# Patient Record
Sex: FEMALE | Race: BLACK OR AFRICAN AMERICAN | NOT HISPANIC OR LATINO | Employment: OTHER | URBAN - METROPOLITAN AREA
[De-identification: names, ages, dates, MRNs, and addresses within clinical notes are randomized per-mention and may not be internally consistent; named-entity substitution may affect disease eponyms.]

---

## 2017-08-03 ENCOUNTER — TRANSCRIBE ORDERS (OUTPATIENT)
Dept: ADMINISTRATIVE | Facility: HOSPITAL | Age: 57
End: 2017-08-03

## 2017-08-03 DIAGNOSIS — Z12.31 VISIT FOR SCREENING MAMMOGRAM: Primary | ICD-10-CM

## 2017-08-09 ENCOUNTER — HOSPITAL ENCOUNTER (OUTPATIENT)
Dept: MAMMOGRAPHY | Facility: HOSPITAL | Age: 57
Discharge: HOME | End: 2017-08-09
Attending: OBSTETRICS & GYNECOLOGY | Admitting: OBSTETRICS & GYNECOLOGY

## 2017-08-09 DIAGNOSIS — Z12.31 VISIT FOR SCREENING MAMMOGRAM: ICD-10-CM

## 2017-08-09 PROCEDURE — G0202 SCR MAMMO BI INCL CAD: HCPCS

## 2017-08-09 PROCEDURE — 77063 BREAST TOMOSYNTHESIS BI: CPT | Performed by: RADIOLOGY

## 2017-08-09 PROCEDURE — 77067 SCR MAMMO BI INCL CAD: CPT | Performed by: RADIOLOGY

## 2017-08-09 PROCEDURE — 77063 BREAST TOMOSYNTHESIS BI: CPT

## 2018-09-02 ENCOUNTER — APPOINTMENT (OUTPATIENT)
Dept: GENERAL RADIOLOGY | Facility: HOSPITAL | Age: 58
End: 2018-09-02

## 2018-09-02 ENCOUNTER — HOSPITAL ENCOUNTER (EMERGENCY)
Facility: HOSPITAL | Age: 58
Discharge: HOME | End: 2018-09-02
Attending: EMERGENCY MEDICINE | Admitting: EMERGENCY MEDICINE

## 2018-09-02 ENCOUNTER — APPOINTMENT (OUTPATIENT)
Dept: MRI IMAGING | Facility: HOSPITAL | Age: 58
End: 2018-09-02

## 2018-09-02 VITALS
DIASTOLIC BLOOD PRESSURE: 85 MMHG | SYSTOLIC BLOOD PRESSURE: 154 MMHG | HEIGHT: 65 IN | HEART RATE: 75 BPM | RESPIRATION RATE: 18 BRPM | TEMPERATURE: 98.3 F | OXYGEN SATURATION: 99 % | BODY MASS INDEX: 28.32 KG/M2 | WEIGHT: 170 LBS

## 2018-09-02 DIAGNOSIS — R00.2 RAPID PALPITATIONS: ICD-10-CM

## 2018-09-02 DIAGNOSIS — R55 NEAR SYNCOPE: Primary | ICD-10-CM

## 2018-09-02 DIAGNOSIS — R42 LIGHT HEADEDNESS: ICD-10-CM

## 2018-09-02 LAB
ALBUMIN SERPL-MCNC: 4.83 G/DL (ref 3.2–4.8)
ALBUMIN/GLOB SERPL: 1.7 G/DL (ref 1.5–2.5)
ALP SERPL-CCNC: 84 U/L (ref 25–100)
ALT SERPL W P-5'-P-CCNC: 14 U/L (ref 7–40)
ANION GAP SERPL CALCULATED.3IONS-SCNC: 11 MMOL/L (ref 3–11)
AST SERPL-CCNC: 19 U/L (ref 0–33)
BASOPHILS # BLD AUTO: 0.01 10*3/MM3 (ref 0–0.2)
BASOPHILS NFR BLD AUTO: 0.2 % (ref 0–1)
BILIRUB SERPL-MCNC: 0.4 MG/DL (ref 0.3–1.2)
BILIRUB UR QL STRIP: NEGATIVE
BUN BLD-MCNC: 15 MG/DL (ref 9–23)
BUN/CREAT SERPL: 17.6 (ref 7–25)
CALCIUM SPEC-SCNC: 9.7 MG/DL (ref 8.7–10.4)
CHLORIDE SERPL-SCNC: 103 MMOL/L (ref 99–109)
CLARITY UR: CLEAR
CO2 SERPL-SCNC: 29 MMOL/L (ref 20–31)
COLOR UR: YELLOW
CREAT BLD-MCNC: 0.85 MG/DL (ref 0.6–1.3)
DEPRECATED RDW RBC AUTO: 40.6 FL (ref 37–54)
EOSINOPHIL # BLD AUTO: 0.1 10*3/MM3 (ref 0–0.3)
EOSINOPHIL NFR BLD AUTO: 2.1 % (ref 0–3)
ERYTHROCYTE [DISTWIDTH] IN BLOOD BY AUTOMATED COUNT: 13.1 % (ref 11.3–14.5)
GFR SERPL CREATININE-BSD FRML MDRD: 83 ML/MIN/1.73
GLOBULIN UR ELPH-MCNC: 2.8 GM/DL
GLUCOSE BLD-MCNC: 100 MG/DL (ref 70–100)
GLUCOSE UR STRIP-MCNC: NEGATIVE MG/DL
HCT VFR BLD AUTO: 40.2 % (ref 34.5–44)
HGB BLD-MCNC: 12.4 G/DL (ref 11.5–15.5)
HGB UR QL STRIP.AUTO: NEGATIVE
HOLD SPECIMEN: NORMAL
HOLD SPECIMEN: NORMAL
IMM GRANULOCYTES # BLD: 0.04 10*3/MM3 (ref 0–0.03)
IMM GRANULOCYTES NFR BLD: 0.8 % (ref 0–0.6)
KETONES UR QL STRIP: NEGATIVE
LEUKOCYTE ESTERASE UR QL STRIP.AUTO: NEGATIVE
LYMPHOCYTES # BLD AUTO: 1.33 10*3/MM3 (ref 0.6–4.8)
LYMPHOCYTES NFR BLD AUTO: 27.4 % (ref 24–44)
MAGNESIUM SERPL-MCNC: 2.1 MG/DL (ref 1.3–2.7)
MCH RBC QN AUTO: 26.4 PG (ref 27–31)
MCHC RBC AUTO-ENTMCNC: 30.8 G/DL (ref 32–36)
MCV RBC AUTO: 85.5 FL (ref 80–99)
MONOCYTES # BLD AUTO: 0.2 10*3/MM3 (ref 0–1)
MONOCYTES NFR BLD AUTO: 4.1 % (ref 0–12)
NEUTROPHILS # BLD AUTO: 3.21 10*3/MM3 (ref 1.5–8.3)
NEUTROPHILS NFR BLD AUTO: 66.2 % (ref 41–71)
NITRITE UR QL STRIP: NEGATIVE
PH UR STRIP.AUTO: 7.5 [PH] (ref 5–8)
PLATELET # BLD AUTO: 218 10*3/MM3 (ref 150–450)
PMV BLD AUTO: 10.9 FL (ref 6–12)
POTASSIUM BLD-SCNC: 3.4 MMOL/L (ref 3.5–5.5)
PROT SERPL-MCNC: 7.6 G/DL (ref 5.7–8.2)
PROT UR QL STRIP: NEGATIVE
RBC # BLD AUTO: 4.7 10*6/MM3 (ref 3.89–5.14)
SODIUM BLD-SCNC: 143 MMOL/L (ref 132–146)
SP GR UR STRIP: 1.01 (ref 1–1.03)
TROPONIN I SERPL-MCNC: 0 NG/ML (ref 0–0.07)
UROBILINOGEN UR QL STRIP: NORMAL
WBC NRBC COR # BLD: 4.85 10*3/MM3 (ref 3.5–10.8)
WHOLE BLOOD HOLD SPECIMEN: NORMAL
WHOLE BLOOD HOLD SPECIMEN: NORMAL

## 2018-09-02 PROCEDURE — 81003 URINALYSIS AUTO W/O SCOPE: CPT | Performed by: EMERGENCY MEDICINE

## 2018-09-02 PROCEDURE — 70551 MRI BRAIN STEM W/O DYE: CPT

## 2018-09-02 PROCEDURE — 83735 ASSAY OF MAGNESIUM: CPT | Performed by: EMERGENCY MEDICINE

## 2018-09-02 PROCEDURE — 25010000002 LORAZEPAM PER 2 MG: Performed by: EMERGENCY MEDICINE

## 2018-09-02 PROCEDURE — 85025 COMPLETE CBC W/AUTO DIFF WBC: CPT | Performed by: EMERGENCY MEDICINE

## 2018-09-02 PROCEDURE — 93005 ELECTROCARDIOGRAM TRACING: CPT | Performed by: EMERGENCY MEDICINE

## 2018-09-02 PROCEDURE — 80053 COMPREHEN METABOLIC PANEL: CPT | Performed by: EMERGENCY MEDICINE

## 2018-09-02 PROCEDURE — 84484 ASSAY OF TROPONIN QUANT: CPT

## 2018-09-02 PROCEDURE — 99285 EMERGENCY DEPT VISIT HI MDM: CPT

## 2018-09-02 PROCEDURE — 96374 THER/PROPH/DIAG INJ IV PUSH: CPT

## 2018-09-02 PROCEDURE — 71045 X-RAY EXAM CHEST 1 VIEW: CPT

## 2018-09-02 RX ORDER — AMLODIPINE BESYLATE 2.5 MG/1
2.5 TABLET ORAL DAILY
COMMUNITY
End: 2019-01-25 | Stop reason: SDUPTHER

## 2018-09-02 RX ORDER — DOXAZOSIN MESYLATE 4 MG/1
4 TABLET ORAL NIGHTLY
COMMUNITY
End: 2018-09-06

## 2018-09-02 RX ORDER — LOSARTAN POTASSIUM AND HYDROCHLOROTHIAZIDE 25; 100 MG/1; MG/1
1 TABLET ORAL DAILY
COMMUNITY
End: 2020-08-06 | Stop reason: DRUGHIGH

## 2018-09-02 RX ORDER — LORAZEPAM 2 MG/ML
1 INJECTION INTRAMUSCULAR ONCE
Status: COMPLETED | OUTPATIENT
Start: 2018-09-02 | End: 2018-09-02

## 2018-09-02 RX ORDER — POTASSIUM CHLORIDE 750 MG/1
20 CAPSULE, EXTENDED RELEASE ORAL ONCE
Status: COMPLETED | OUTPATIENT
Start: 2018-09-02 | End: 2018-09-02

## 2018-09-02 RX ORDER — SODIUM CHLORIDE 0.9 % (FLUSH) 0.9 %
10 SYRINGE (ML) INJECTION AS NEEDED
Status: DISCONTINUED | OUTPATIENT
Start: 2018-09-02 | End: 2018-09-02 | Stop reason: HOSPADM

## 2018-09-02 RX ADMIN — LORAZEPAM 1 MG: 2 INJECTION INTRAMUSCULAR; INTRAVENOUS at 11:47

## 2018-09-02 RX ADMIN — POTASSIUM CHLORIDE 20 MEQ: 750 CAPSULE, EXTENDED RELEASE ORAL at 13:29

## 2018-09-02 NOTE — ED PROVIDER NOTES
Subjective   57-year-old female presents to the emergency department after an episode of lightheadedness and presyncope.  The patient states that she was getting ready for [employer] this morning and had a sudden onset of profound lightheadedness.  She thought she might pass out.  She then noticed that her heart was racing and pounding.  Her symptoms resolved after a few minutes.  She had no associated nausea.  No tenderness.  The patient notes that she has had several episodes like this in the past that typically resolve after a brief period.  She had a 24-hour Holter monitor about 2 years ago by her PCP after one of these events but no arrhythmia was detected.  The patient states that during the episode today, she had some tingling in the left hand and left foot that resolved after the episode resolved.  The patient states that she was up and had been standing for a while prior to the onset of her symptoms today.  She currently denies any dizziness or lightheadedness and the tingling in her hand and foot have resolved.  There has been no recent illness.  No vomiting or diarrhea.  No change in her medications.  Her only medical history is of hypertension (on losartan, amlodipine and oxygen Zosyn).  She is a nonsmoker.  No alcohol or drug use.  Her PCP is Dr. Franklin Hunter.            Review of Systems   Constitutional: Negative for appetite change, chills, diaphoresis, fatigue and fever.   HENT: Negative for ear pain and tinnitus.    Eyes: Negative for visual disturbance.   Respiratory: Negative for cough and shortness of breath.    Cardiovascular: Positive for palpitations (period of racing heart). Negative for chest pain.   Gastrointestinal: Negative for abdominal pain, blood in stool, nausea and vomiting.   Endocrine: Negative for polydipsia, polyphagia and polyuria.   Genitourinary: Negative for dysuria.   Musculoskeletal: Negative for back pain and neck pain.   Skin: Negative for pallor and rash.    Allergic/Immunologic: Negative for immunocompromised state.   Neurological: Positive for dizziness and light-headedness. Negative for weakness.        Brief period of tingling in the left hand and foot   Hematological: Does not bruise/bleed easily.   Psychiatric/Behavioral: Negative for confusion.       Past Medical History:   Diagnosis Date   • Heart murmur    • Hypertension        No Known Allergies    Past Surgical History:   Procedure Laterality Date   • HYSTERECTOMY  07/15/2008   • OOPHORECTOMY Left 07/15/2008       Family History   Problem Relation Age of Onset   • Breast cancer Neg Hx    • Ovarian cancer Neg Hx        Social History     Social History   • Marital status: Single     Social History Main Topics   • Smoking status: Never Smoker   • Alcohol use No   • Drug use: No   • Sexual activity: Defer     Other Topics Concern   • Not on file           Objective   Physical Exam   Constitutional: She is oriented to person, place, and time. She appears well-developed and well-nourished. No distress.   HENT:   Head: Normocephalic.   Nose: Nose normal.   Mouth/Throat: Oropharynx is clear and moist.   Normal TMs bilaterally.   Eyes: Pupils are equal, round, and reactive to light. Conjunctivae and EOM are normal.   No nystagmus.   Neck: Normal range of motion. Neck supple.   No carotid bruits.   Cardiovascular: Normal rate, regular rhythm, normal heart sounds and intact distal pulses.    Pulmonary/Chest: Effort normal and breath sounds normal. She has no wheezes.   Abdominal: Soft. Bowel sounds are normal. There is no tenderness.   Musculoskeletal: Normal range of motion. She exhibits no edema or tenderness.   Neurological: She is alert and oriented to person, place, and time.   Skin: Skin is warm and dry.   Psychiatric: She has a normal mood and affect.       Procedures           ED Course      The pt is resting comfortably.  She has had no further episodes of light-headedness or dizziness.  No arhythmias seen  in ER.  MRI is negative.  Mild hypokalemia at 3.4.  Will give a po dose of KCL 20meq here.  The pt states she has had several of these episodes over the past 2 years.  I spoke with her about the possibility of orthostatic reaction while on her alpha blocker but she states she was already standing.  She was not orthostatic here.  The other concern would be for an arhythmia.  I will d/c her home with f/u for an event recorder.  She also has a murmur that has not been evaluated.  Will refer to heart and valve clinic for further evaluation of that.  Recent Results (from the past 24 hour(s))   Comprehensive Metabolic Panel    Collection Time: 09/02/18 10:44 AM   Result Value Ref Range    Glucose 100 70 - 100 mg/dL    BUN 15 9 - 23 mg/dL    Creatinine 0.85 0.60 - 1.30 mg/dL    Sodium 143 132 - 146 mmol/L    Potassium 3.4 (L) 3.5 - 5.5 mmol/L    Chloride 103 99 - 109 mmol/L    CO2 29.0 20.0 - 31.0 mmol/L    Calcium 9.7 8.7 - 10.4 mg/dL    Total Protein 7.6 5.7 - 8.2 g/dL    Albumin 4.83 (H) 3.20 - 4.80 g/dL    ALT (SGPT) 14 7 - 40 U/L    AST (SGOT) 19 0 - 33 U/L    Alkaline Phosphatase 84 25 - 100 U/L    Total Bilirubin 0.4 0.3 - 1.2 mg/dL    eGFR  African Amer 83 >60 mL/min/1.73    Globulin 2.8 gm/dL    A/G Ratio 1.7 1.5 - 2.5 g/dL    BUN/Creatinine Ratio 17.6 7.0 - 25.0    Anion Gap 11.0 3.0 - 11.0 mmol/L   Magnesium    Collection Time: 09/02/18 10:44 AM   Result Value Ref Range    Magnesium 2.1 1.3 - 2.7 mg/dL   Light Blue Top    Collection Time: 09/02/18 10:44 AM   Result Value Ref Range    Extra Tube hold for add-on    Green Top (Gel)    Collection Time: 09/02/18 10:44 AM   Result Value Ref Range    Extra Tube Hold for add-ons.    Lavender Top    Collection Time: 09/02/18 10:44 AM   Result Value Ref Range    Extra Tube hold for add-on    Gold Top - SST    Collection Time: 09/02/18 10:44 AM   Result Value Ref Range    Extra Tube Hold for add-ons.    CBC Auto Differential    Collection Time: 09/02/18 10:44 AM   Result  Value Ref Range    WBC 4.85 3.50 - 10.80 10*3/mm3    RBC 4.70 3.89 - 5.14 10*6/mm3    Hemoglobin 12.4 11.5 - 15.5 g/dL    Hematocrit 40.2 34.5 - 44.0 %    MCV 85.5 80.0 - 99.0 fL    MCH 26.4 (L) 27.0 - 31.0 pg    MCHC 30.8 (L) 32.0 - 36.0 g/dL    RDW 13.1 11.3 - 14.5 %    RDW-SD 40.6 37.0 - 54.0 fl    MPV 10.9 6.0 - 12.0 fL    Platelets 218 150 - 450 10*3/mm3    Neutrophil % 66.2 41.0 - 71.0 %    Lymphocyte % 27.4 24.0 - 44.0 %    Monocyte % 4.1 0.0 - 12.0 %    Eosinophil % 2.1 0.0 - 3.0 %    Basophil % 0.2 0.0 - 1.0 %    Immature Grans % 0.8 (H) 0.0 - 0.6 %    Neutrophils, Absolute 3.21 1.50 - 8.30 10*3/mm3    Lymphocytes, Absolute 1.33 0.60 - 4.80 10*3/mm3    Monocytes, Absolute 0.20 0.00 - 1.00 10*3/mm3    Eosinophils, Absolute 0.10 0.00 - 0.30 10*3/mm3    Basophils, Absolute 0.01 0.00 - 0.20 10*3/mm3    Immature Grans, Absolute 0.04 (H) 0.00 - 0.03 10*3/mm3   POC Troponin, Rapid    Collection Time: 09/02/18 10:44 AM   Result Value Ref Range    Troponin I 0.00 0.00 - 0.07 ng/mL   Urinalysis With Microscopic If Indicated (No Culture) - Urine, Clean Catch    Collection Time: 09/02/18 12:56 PM   Result Value Ref Range    Color, UA Yellow Yellow, Straw    Appearance, UA Clear Clear    pH, UA 7.5 5.0 - 8.0    Specific Gravity, UA 1.015 1.001 - 1.030    Glucose, UA Negative Negative    Ketones, UA Negative Negative    Bilirubin, UA Negative Negative    Blood, UA Negative Negative    Protein, UA Negative Negative    Leuk Esterase, UA Negative Negative    Nitrite, UA Negative Negative    Urobilinogen, UA 0.2 E.U./dL 0.2 - 1.0 E.U./dL     Note: In addition to lab results from this visit, the labs listed above may include labs taken at another facility or during a different encounter within the last 24 hours. Please correlate lab times with ED admission and discharge times for further clarification of the services performed during this visit.    MRI Brain Without Contrast   Preliminary Result   Unremarkable evaluation of  the brain.        DICTATED:   9/2/2018   EDITED/ls :   9/2/2018                   XR Chest 1 View   Final Result   No acute cardiopulmonary abnormality.       DICTATED:   9/2/2018   EDITED/ls :   9/2/2018        This report was finalized on 9/2/2018 1:00 PM by Pasha Etienne.            Vitals:    09/02/18 1145 09/02/18 1247 09/02/18 1249 09/02/18 1250   BP: 170/99 144/84 153/82 154/85   Patient Position:  Lying Sitting Standing   Pulse: 87 75 73 75   Resp:       Temp:       TempSrc:       SpO2: 99%      Weight:       Height:         Medications   sodium chloride 0.9 % flush 10 mL (not administered)   potassium chloride (MICRO-K) CR capsule 20 mEq (not administered)   LORazepam (ATIVAN) injection 1 mg (1 mg Intravenous Given 9/2/18 1147)     ECG/EMG Results (last 24 hours)     Procedure Component Value Units Date/Time    ECG 12 Lead [[id number]] Collected:  09/02/18 1008     Updated:  09/02/18 1009                    [facility]      Final diagnoses:   Near syncope   Light headedness   Rapid palpitations            Lonny Wilcox PA  09/02/18 1320

## 2018-09-02 NOTE — DISCHARGE INSTRUCTIONS
Rest.  Plenty of fluids.  Continue current meds.  Follow up with the [facility] for placement of an event recorder.  Call for follow up with Dr. Marks after event recorder.

## 2018-09-05 NOTE — PROGRESS NOTES
Encounter Date:09/06/2018      Patient ID: Nancy Albert is a 57 y.o. female.        Subjective:     Chief Complaint: Establish Care   palpitations, htn  History of Present Illness patient presents to the office today for ongoing evaluation of her palpitations and recent near syncopal episode. She presented to [facility] ED on 9/2/2018 after experiencing rapid heart rate and presyncope at home while getting ready for [employer].Symptoms  lasted only a few minutes.  She had no associated chest pain or nausea.  She reports a history of brief episodes in the past.  Denies syncope. She notes a history of a stress test in the past, data deficient. She notes that her blood pressure has been elevated and she notes that her norvasc had been increased to 5 in the past but she was experienced significant lightheadedness.       Patient Active Problem List   Diagnosis   • Palpitations   • Heart murmur   • Hypertension     Past Surgical History:   Procedure Laterality Date   • HYSTERECTOMY  07/15/2008   • OOPHORECTOMY Left 07/15/2008       No Known Allergies      Current Outpatient Prescriptions:   •  amLODIPine (NORVASC) 2.5 MG tablet, Take 2.5 mg by mouth Daily., Disp: , Rfl:   •  Doxazosin 4 mg qhs   •  losartan-hydrochlorothiazide (HYZAAR) 100-25 MG per tablet, Take 1 tablet by mouth Daily., Disp: , Rfl:     The following portions of the chart were reviewed and updated as appropriate: Allergies, current medications, past family history, social history, past medical history.     Review of Systems   Constitution: Positive for malaise/fatigue. Negative for chills, decreased appetite, diaphoresis, fever, weakness, night sweats, weight gain and weight loss.   HENT: Negative for congestion, hearing loss, hoarse voice and nosebleeds.    Eyes: Negative for blurred vision, visual disturbance and visual halos.   Cardiovascular: Positive for irregular heartbeat, near-syncope and palpitations. Negative for chest pain,  "claudication, cyanosis, dyspnea on exertion, leg swelling, orthopnea, paroxysmal nocturnal dyspnea and syncope.   Respiratory: Positive for snoring. Negative for cough, hemoptysis, shortness of breath, sleep disturbances due to breathing, sputum production and wheezing.    Hematologic/Lymphatic: Negative for bleeding problem. Does not bruise/bleed easily.   Skin: Negative for dry skin, itching and rash.   Musculoskeletal: Positive for joint pain. Negative for arthritis, falls, joint swelling and myalgias.   Gastrointestinal: Negative for bloating, abdominal pain, constipation, diarrhea, flatus, heartburn, hematemesis, hematochezia, melena, nausea and vomiting.   Genitourinary: Negative for dysuria, frequency, hematuria, nocturia and urgency.   Neurological: Positive for light-headedness. Negative for excessive daytime sleepiness, dizziness, headaches and loss of balance.   Psychiatric/Behavioral: Negative for depression. The patient does not have insomnia and is not nervous/anxious.            Objective:     Vitals:    09/06/18 1022 09/06/18 1025 09/06/18 1026   BP: 152/94 148/79 150/89   BP Location: Right arm Left arm Left arm   Patient Position: Sitting Sitting Standing   Pulse: 77  87   Resp: 18     Temp: 98 °F (36.7 °C)     TempSrc: Temporal Artery      SpO2: 98%     Weight: 77.7 kg (171 lb 6.4 oz)     Height: 165.1 cm (65\")           Physical Exam   Constitutional: She is oriented to person, place, and time. She appears well-developed and well-nourished. She is active and cooperative. No distress.   HENT:   Head: Normocephalic and atraumatic.   Mouth/Throat: Oropharynx is clear and moist.   Eyes: Pupils are equal, round, and reactive to light. Conjunctivae and EOM are normal.   Neck: Normal range of motion. Neck supple. No JVD present. No tracheal deviation present. No thyromegaly present.   Cardiovascular: Normal rate, regular rhythm and intact distal pulses.    Murmur heard.  Pulmonary/Chest: Effort normal " and breath sounds normal.   Abdominal: Soft. Bowel sounds are normal. She exhibits no distension. There is no tenderness.   Musculoskeletal: Normal range of motion.   Neurological: She is alert and oriented to person, place, and time.   Skin: Skin is warm, dry and intact.   Psychiatric: She has a normal mood and affect. Her behavior is normal.   Nursing note and vitals reviewed.      Lab and Diagnostic Review:      Results for orders placed or performed during the hospital encounter of 09/02/18   Comprehensive Metabolic Panel   Result Value Ref Range    Glucose 100 70 - 100 mg/dL    BUN 15 9 - 23 mg/dL    Creatinine 0.85 0.60 - 1.30 mg/dL    Sodium 143 132 - 146 mmol/L    Potassium 3.4 (L) 3.5 - 5.5 mmol/L    Chloride 103 99 - 109 mmol/L    CO2 29.0 20.0 - 31.0 mmol/L    Calcium 9.7 8.7 - 10.4 mg/dL    Total Protein 7.6 5.7 - 8.2 g/dL    Albumin 4.83 (H) 3.20 - 4.80 g/dL    ALT (SGPT) 14 7 - 40 U/L    AST (SGOT) 19 0 - 33 U/L    Alkaline Phosphatase 84 25 - 100 U/L    Total Bilirubin 0.4 0.3 - 1.2 mg/dL    eGFR  African Amer 83 >60 mL/min/1.73    Globulin 2.8 gm/dL    A/G Ratio 1.7 1.5 - 2.5 g/dL    BUN/Creatinine Ratio 17.6 7.0 - 25.0    Anion Gap 11.0 3.0 - 11.0 mmol/L   Magnesium   Result Value Ref Range    Magnesium 2.1 1.3 - 2.7 mg/dL   Urinalysis With Microscopic If Indicated (No Culture) - Urine, Clean Catch   Result Value Ref Range    Color, UA Yellow Yellow, Straw    Appearance, UA Clear Clear    pH, UA 7.5 5.0 - 8.0    Specific Gravity, UA 1.015 1.001 - 1.030    Glucose, UA Negative Negative    Ketones, UA Negative Negative    Bilirubin, UA Negative Negative    Blood, UA Negative Negative    Protein, UA Negative Negative    Leuk Esterase, UA Negative Negative    Nitrite, UA Negative Negative    Urobilinogen, UA 0.2 E.U./dL 0.2 - 1.0 E.U./dL   CBC Auto Differential   Result Value Ref Range    WBC 4.85 3.50 - 10.80 10*3/mm3    RBC 4.70 3.89 - 5.14 10*6/mm3    Hemoglobin 12.4 11.5 - 15.5 g/dL    Hematocrit  40.2 34.5 - 44.0 %    MCV 85.5 80.0 - 99.0 fL    MCH 26.4 (L) 27.0 - 31.0 pg    MCHC 30.8 (L) 32.0 - 36.0 g/dL    RDW 13.1 11.3 - 14.5 %    RDW-SD 40.6 37.0 - 54.0 fl    MPV 10.9 6.0 - 12.0 fL    Platelets 218 150 - 450 10*3/mm3    Neutrophil % 66.2 41.0 - 71.0 %    Lymphocyte % 27.4 24.0 - 44.0 %    Monocyte % 4.1 0.0 - 12.0 %    Eosinophil % 2.1 0.0 - 3.0 %    Basophil % 0.2 0.0 - 1.0 %    Immature Grans % 0.8 (H) 0.0 - 0.6 %    Neutrophils, Absolute 3.21 1.50 - 8.30 10*3/mm3    Lymphocytes, Absolute 1.33 0.60 - 4.80 10*3/mm3    Monocytes, Absolute 0.20 0.00 - 1.00 10*3/mm3    Eosinophils, Absolute 0.10 0.00 - 0.30 10*3/mm3    Basophils, Absolute 0.01 0.00 - 0.20 10*3/mm3    Immature Grans, Absolute 0.04 (H) 0.00 - 0.03 10*3/mm3   POC Troponin, Rapid   Result Value Ref Range    Troponin I 0.00 0.00 - 0.07 ng/mL     9/2/18 NSR     Assessment and Plan:         1. Palpitations    - Adult Transthoracic Echo Complete W/ Cont if Necessary Per Protocol; Future  - Holter Monitor - 72 Hour Up To 21 Days; Future    2. Essential hypertension  D/c cardura  Begin hydralazine 25 mg bid  HTN Education provided today including signs and symptoms, medication management, daily blood pressure monitoring. Patient encouraged to call the Heart and Valve center with any abnormal readings.   - Adult Transthoracic Echo Complete W/ Cont if Necessary Per Protocol; Future    3. Murmur, cardiac    - Adult Transthoracic Echo Complete W/ Cont if Necessary Per Protocol; Future    It has been a pleasure to participate in the care of this patient.  Patient was instructed to call the [facility] with any questions, concerns, or worsening symptoms.    * Please note that portions of this note were completed with a voice recognition program. Efforts were made to edit the dictation but occasionally words are transcribed.

## 2018-09-06 ENCOUNTER — OFFICE VISIT (OUTPATIENT)
Dept: CARDIOLOGY | Facility: HOSPITAL | Age: 58
End: 2018-09-06

## 2018-09-06 ENCOUNTER — HOSPITAL ENCOUNTER (OUTPATIENT)
Dept: CARDIOLOGY | Facility: HOSPITAL | Age: 58
Discharge: HOME | End: 2018-09-06
Admitting: NURSE PRACTITIONER

## 2018-09-06 VITALS
BODY MASS INDEX: 28.56 KG/M2 | RESPIRATION RATE: 18 BRPM | TEMPERATURE: 98 F | HEART RATE: 87 BPM | OXYGEN SATURATION: 98 % | DIASTOLIC BLOOD PRESSURE: 89 MMHG | HEIGHT: 65 IN | SYSTOLIC BLOOD PRESSURE: 150 MMHG | WEIGHT: 171.4 LBS

## 2018-09-06 DIAGNOSIS — R00.2 PALPITATIONS: Primary | ICD-10-CM

## 2018-09-06 DIAGNOSIS — R00.2 PALPITATIONS: ICD-10-CM

## 2018-09-06 DIAGNOSIS — R01.1 MURMUR, CARDIAC: ICD-10-CM

## 2018-09-06 DIAGNOSIS — I10 ESSENTIAL HYPERTENSION: ICD-10-CM

## 2018-09-06 PROCEDURE — 0296T HC EXT ECG > 48HR TO 21 DAY RCRD W/CONECT INTL RCRD: CPT

## 2018-09-06 PROCEDURE — 0298T HOLTER MONITOR - 72 HOUR UP TO 21 DAY: CPT | Performed by: INTERNAL MEDICINE

## 2018-09-06 PROCEDURE — 99214 OFFICE O/P EST MOD 30 MIN: CPT | Performed by: NURSE PRACTITIONER

## 2018-09-06 RX ORDER — HYDRALAZINE HYDROCHLORIDE 25 MG/1
25 TABLET, FILM COATED ORAL 2 TIMES DAILY
Qty: 60 TABLET | Refills: 11 | Status: SHIPPED | OUTPATIENT
Start: 2018-09-06 | End: 2018-10-03 | Stop reason: SINTOL

## 2018-09-10 ENCOUNTER — TELEPHONE (OUTPATIENT)
Dept: CARDIOLOGY | Facility: HOSPITAL | Age: 58
End: 2018-09-10

## 2018-09-10 PROBLEM — R01.1 HEART MURMUR: Status: ACTIVE | Noted: 2018-09-10

## 2018-09-10 PROBLEM — I10 HYPERTENSION: Status: ACTIVE | Noted: 2018-09-10

## 2018-09-10 PROBLEM — R00.2 PALPITATIONS: Status: ACTIVE | Noted: 2018-09-10

## 2018-09-10 NOTE — TELEPHONE ENCOUNTER
----- Message from Ankit France sent at 9/10/2018 11:38 AM EDT -----  From Centralized Scheduling:    UNABLE TO AFFORD ECHO

## 2018-09-20 ENCOUNTER — APPOINTMENT (OUTPATIENT)
Dept: CARDIOLOGY | Facility: HOSPITAL | Age: 58
End: 2018-09-20

## 2018-09-27 ENCOUNTER — TELEPHONE (OUTPATIENT)
Dept: CARDIOLOGY | Facility: HOSPITAL | Age: 58
End: 2018-09-27

## 2018-09-27 DIAGNOSIS — I47.29 NSVT (NONSUSTAINED VENTRICULAR TACHYCARDIA) (HCC): Primary | ICD-10-CM

## 2018-09-27 NOTE — TELEPHONE ENCOUNTER
Attempted to reach patient to discuss abnormal findings on recent monitor. Left voicemail. Will order stress echo to evaluate.

## 2018-10-01 ENCOUNTER — TELEPHONE (OUTPATIENT)
Dept: CARDIOLOGY | Facility: HOSPITAL | Age: 58
End: 2018-10-01

## 2018-10-01 NOTE — TELEPHONE ENCOUNTER
Attempted to call patient to discuss recent monitor findings. Stress echo has been ordered, to be scheduled through centralized scheduling.

## 2018-10-02 NOTE — PROGRESS NOTES
Encounter Date:10/03/2018      Patient ID: Nancy Albert is a 57 y.o. female.        Subjective:     Chief Complaint: Follow-up   palpitations and htn    History of Present Illness  Patient presents to the office today for ongoing evaluation of her palpitations and hypertension. Patient was seen in [facility] one month ago and she was started on hydralazine for her htn in place of cardura. She notes since starting the hydralazine she has felt terrible. She notes worsening fatigue, chest tightness and a lump in her throat. She also notes intermittent lightheadedness. She presents today with bp log that shows bps 143//86.     Patient Active Problem List   Diagnosis   • Palpitations   • Heart murmur   • Hypertension   • NSVT (nonsustained ventricular tachycardia) (CMS/[facility])       Past Surgical History:   Procedure Laterality Date   • HYSTERECTOMY  07/15/2008   • OOPHORECTOMY Left 07/15/2008       No Known Allergies      Current Outpatient Prescriptions:   •  amLODIPine (NORVASC) 2.5 MG tablet, Take 2.5 mg by mouth Daily., Disp: , Rfl:   •  losartan-hydrochlorothiazide (HYZAAR) 100-25 MG per tablet, Take 1 tablet by mouth Daily., Disp: , Rfl:   •  hydralazine 25 mg  Take 1 tablet by mouth 2 (Two) Times a Day., Disp: 60 tablet, Rfl: 11    The following portions of the chart were reviewed and updated as appropriate: Allergies, current medications, past family history, social history, past medical history.     Review of Systems   Constitution: Positive for malaise/fatigue. Negative for chills, decreased appetite, diaphoresis, fever, weakness, night sweats, weight gain and weight loss.   HENT: Negative for congestion, hearing loss, hoarse voice and nosebleeds.    Eyes: Negative for blurred vision, visual disturbance and visual halos.   Cardiovascular: Positive for chest pain, dyspnea on exertion and palpitations. Negative for claudication, cyanosis, irregular heartbeat, leg swelling, near-syncope, orthopnea, paroxysmal  "nocturnal dyspnea and syncope.   Respiratory: Negative for cough, hemoptysis, shortness of breath, sleep disturbances due to breathing, snoring, sputum production and wheezing.    Hematologic/Lymphatic: Negative for bleeding problem. Does not bruise/bleed easily.   Skin: Negative for dry skin, itching and rash.   Musculoskeletal: Negative for arthritis, joint pain, joint swelling and myalgias.   Gastrointestinal: Negative for bloating, abdominal pain, constipation, diarrhea, flatus, heartburn, hematemesis, hematochezia, melena, nausea and vomiting.   Genitourinary: Negative for dysuria, frequency, hematuria, nocturia and urgency.   Neurological: Positive for light-headedness. Negative for excessive daytime sleepiness, dizziness, headaches and loss of balance.   Psychiatric/Behavioral: Negative for depression. The patient does not have insomnia and is not nervous/anxious.            Objective:     Vitals:    10/03/18 1051 10/03/18 1053   BP: 162/77 156/66   BP Location: Left arm Left arm   Patient Position: Sitting Standing   Cuff Size: Adult    Pulse: 75 87   Resp: 16    Temp: 97.8 °F (36.6 °C)    TempSrc: Temporal Artery     SpO2: 98%    Weight: 75.8 kg (167 lb)    Height: 165.1 cm (65\")          Physical Exam   Constitutional: She is oriented to person, place, and time. She appears well-developed and well-nourished. She is active and cooperative. No distress.   HENT:   Head: Normocephalic and atraumatic.   Mouth/Throat: Oropharynx is clear and moist.   Eyes: Pupils are equal, round, and reactive to light. Conjunctivae and EOM are normal.   Neck: Normal range of motion. Neck supple. No JVD present. No tracheal deviation present. No thyromegaly present.   Cardiovascular: Normal rate, regular rhythm and intact distal pulses.    Murmur heard.  Pulmonary/Chest: Effort normal and breath sounds normal.   Abdominal: Soft. Bowel sounds are normal. She exhibits no distension. There is no tenderness.   Musculoskeletal: " Normal range of motion.   Neurological: She is alert and oriented to person, place, and time.   Skin: Skin is warm, dry and intact.   Psychiatric: She has a normal mood and affect. Her behavior is normal.   Nursing note and vitals reviewed.      Lab and Diagnostic Review:   14 day extended holter showed; avg hr of 76, pvc burden 1%, pac burden <1%, one run of NSVT at 6 beats      Assessment and Plan:         1. Palpitations  Low burden on Monitor    2. NSVT (nonsustained ventricular tachycardia) (CMS/HCC)  6 beat run on recent extended holter  Stress echo in near future  3. Essential hypertension  D/c hydralazine due to side effects  Begin coreg 6.25 mg bid  HTN Education provided today including signs and symptoms, medication management, daily blood pressure monitoring. Patient encouraged to call the Heart and Valve center with any abnormal readings.     It has been a pleasure to participate in the care of this patient.  Patient was instructed to call the [facility] with any questions, concerns, or worsening symptoms.  * Please note that portions of this note were completed with a voice recognition program. Efforts were made to edit the dictation but occasionally words are transcribed.

## 2018-10-03 ENCOUNTER — OFFICE VISIT (OUTPATIENT)
Dept: CARDIOLOGY | Facility: HOSPITAL | Age: 58
End: 2018-10-03

## 2018-10-03 VITALS
BODY MASS INDEX: 27.82 KG/M2 | RESPIRATION RATE: 16 BRPM | HEIGHT: 65 IN | DIASTOLIC BLOOD PRESSURE: 66 MMHG | HEART RATE: 87 BPM | SYSTOLIC BLOOD PRESSURE: 156 MMHG | WEIGHT: 167 LBS | TEMPERATURE: 97.8 F | OXYGEN SATURATION: 98 %

## 2018-10-03 DIAGNOSIS — I10 ESSENTIAL HYPERTENSION: ICD-10-CM

## 2018-10-03 DIAGNOSIS — R00.2 PALPITATIONS: Primary | ICD-10-CM

## 2018-10-03 DIAGNOSIS — I47.29 NSVT (NONSUSTAINED VENTRICULAR TACHYCARDIA) (HCC): ICD-10-CM

## 2018-10-03 PROCEDURE — 99214 OFFICE O/P EST MOD 30 MIN: CPT | Performed by: NURSE PRACTITIONER

## 2018-10-03 RX ORDER — CARVEDILOL 6.25 MG/1
6.25 TABLET ORAL 2 TIMES DAILY
Qty: 60 TABLET | Refills: 11 | Status: SHIPPED | OUTPATIENT
Start: 2018-10-03 | End: 2019-02-13

## 2018-10-04 PROBLEM — I47.29 NSVT (NONSUSTAINED VENTRICULAR TACHYCARDIA) (HCC): Status: ACTIVE | Noted: 2018-10-04

## 2018-10-12 ENCOUNTER — TELEPHONE (OUTPATIENT)
Dept: CARDIOLOGY | Facility: HOSPITAL | Age: 58
End: 2018-10-12

## 2018-10-12 ENCOUNTER — HOSPITAL ENCOUNTER (OUTPATIENT)
Dept: CARDIOLOGY | Facility: HOSPITAL | Age: 58
Discharge: HOME | End: 2018-10-12
Admitting: NURSE PRACTITIONER

## 2018-10-12 VITALS
DIASTOLIC BLOOD PRESSURE: 82 MMHG | HEIGHT: 65 IN | BODY MASS INDEX: 27.82 KG/M2 | WEIGHT: 167 LBS | SYSTOLIC BLOOD PRESSURE: 154 MMHG

## 2018-10-12 DIAGNOSIS — I47.29 NSVT (NONSUSTAINED VENTRICULAR TACHYCARDIA) (HCC): ICD-10-CM

## 2018-10-12 LAB
BH CV ECHO MEAS - BSA(HAYCOCK): 1.9 M^2
BH CV ECHO MEAS - BSA: 1.8 M^2
BH CV ECHO MEAS - BZI_BMI: 27.8 KILOGRAMS/M^2
BH CV ECHO MEAS - BZI_METRIC_HEIGHT: 165.1 CM
BH CV ECHO MEAS - BZI_METRIC_WEIGHT: 75.8 KG
BH CV ECHO MEAS - EDV(CUBED): 83.2 ML
BH CV ECHO MEAS - EDV(TEICH): 86.1 ML
BH CV ECHO MEAS - EF(CUBED): 72.5 %
BH CV ECHO MEAS - EF(TEICH): 64.5 %
BH CV ECHO MEAS - ESV(CUBED): 22.8 ML
BH CV ECHO MEAS - ESV(TEICH): 30.5 ML
BH CV ECHO MEAS - FS: 35 %
BH CV ECHO MEAS - IVS/LVPW: 0.95
BH CV ECHO MEAS - IVSD: 0.99 CM
BH CV ECHO MEAS - LV MASS(C)D: 148.6 GRAMS
BH CV ECHO MEAS - LV MASS(C)DI: 81.1 GRAMS/M^2
BH CV ECHO MEAS - LVIDD: 4.4 CM
BH CV ECHO MEAS - LVIDS: 2.8 CM
BH CV ECHO MEAS - LVPWD: 1 CM
BH CV ECHO MEAS - RAP SYSTOLE: 3 MMHG
BH CV ECHO MEAS - RVSP: 29 MMHG
BH CV ECHO MEAS - SI(CUBED): 33 ML/M^2
BH CV ECHO MEAS - SI(TEICH): 30.3 ML/M^2
BH CV ECHO MEAS - SV(CUBED): 60.4 ML
BH CV ECHO MEAS - SV(TEICH): 55.6 ML
BH CV ECHO MEAS - TR MAX PG: 26 MMHG
BH CV ECHO MEAS - TR MAX VEL: 252.3 CM/SEC
BH CV STRESS BP STAGE 1: NORMAL
BH CV STRESS DURATION MIN STAGE 1: 3
BH CV STRESS DURATION MIN STAGE 2: 1
BH CV STRESS DURATION SEC STAGE 1: 0
BH CV STRESS DURATION SEC STAGE 2: 34
BH CV STRESS GRADE STAGE 1: 10
BH CV STRESS GRADE STAGE 2: 12
BH CV STRESS HR STAGE 1: 131
BH CV STRESS HR STAGE 2: 142
BH CV STRESS METS STAGE 1: 5
BH CV STRESS METS STAGE 2: 7.5
BH CV STRESS O2 STAGE 1: 95
BH CV STRESS O2 STAGE 2: 94
BH CV STRESS PROTOCOL 1: NORMAL
BH CV STRESS RECOVERY BP: NORMAL MMHG
BH CV STRESS RECOVERY HR: 74 BPM
BH CV STRESS RECOVERY O2: 98 %
BH CV STRESS SPEED STAGE 1: 1.7
BH CV STRESS SPEED STAGE 2: 2.5
BH CV STRESS STAGE 1: 1
BH CV STRESS STAGE 2: 2
BH CV VAS BP RIGHT ARM: NORMAL MMHG
MAXIMAL PREDICTED HEART RATE: 162 BPM
PERCENT MAX PREDICTED HR: 88.89 %
STRESS BASELINE BP: NORMAL MMHG
STRESS BASELINE HR: 67 BPM
STRESS O2 SAT REST: 98 %
STRESS PERCENT HR: 105 %
STRESS POST ESTIMATED WORKLOAD: 7 METS
STRESS POST EXERCISE DUR MIN: 5 MIN
STRESS POST EXERCISE DUR SEC: 34 SEC
STRESS POST O2 SAT PEAK: 94 %
STRESS POST PEAK BP: NORMAL MMHG
STRESS POST PEAK HR: 144 BPM
STRESS TARGET HR: 138 BPM

## 2018-10-12 PROCEDURE — 93352 ADMIN ECG CONTRAST AGENT: CPT | Performed by: INTERNAL MEDICINE

## 2018-10-12 PROCEDURE — 93350 STRESS TTE ONLY: CPT | Performed by: INTERNAL MEDICINE

## 2018-10-12 PROCEDURE — 25010000002 SULFUR HEXAFLUORIDE MICROSPH 60.7-25 MG RECONSTITUTED SUSPENSION: Performed by: NURSE PRACTITIONER

## 2018-10-12 PROCEDURE — 93350 STRESS TTE ONLY: CPT

## 2018-10-12 PROCEDURE — 93018 CV STRESS TEST I&R ONLY: CPT | Performed by: INTERNAL MEDICINE

## 2018-10-12 PROCEDURE — 93017 CV STRESS TEST TRACING ONLY: CPT

## 2018-10-12 RX ADMIN — SULFUR HEXAFLUORIDE 4 ML: KIT at 10:01

## 2018-10-12 NOTE — TELEPHONE ENCOUNTER
Reviewed stress echo results with patient. Patient to continue coreg 6.25 mg bid. She notes that her blood pressure yesterday was 150/80. Patient to receive a follow-up call in one week to reevaluate bp.

## 2018-10-19 ENCOUNTER — APPOINTMENT (OUTPATIENT)
Dept: CARDIOLOGY | Facility: HOSPITAL | Age: 58
End: 2018-10-19

## 2018-11-06 ENCOUNTER — HOSPITAL ENCOUNTER (OUTPATIENT)
Dept: CARDIOLOGY | Facility: HOSPITAL | Age: 58
Discharge: HOME | End: 2018-11-06
Admitting: NURSE PRACTITIONER

## 2018-11-06 ENCOUNTER — OFFICE VISIT (OUTPATIENT)
Dept: CARDIOLOGY | Facility: HOSPITAL | Age: 58
End: 2018-11-06

## 2018-11-06 VITALS
RESPIRATION RATE: 16 BRPM | TEMPERATURE: 97.5 F | SYSTOLIC BLOOD PRESSURE: 145 MMHG | HEIGHT: 65 IN | WEIGHT: 164 LBS | BODY MASS INDEX: 27.32 KG/M2 | OXYGEN SATURATION: 97 % | DIASTOLIC BLOOD PRESSURE: 82 MMHG | HEART RATE: 64 BPM

## 2018-11-06 DIAGNOSIS — I47.29 NSVT (NONSUSTAINED VENTRICULAR TACHYCARDIA) (HCC): ICD-10-CM

## 2018-11-06 DIAGNOSIS — R00.2 PALPITATIONS: ICD-10-CM

## 2018-11-06 DIAGNOSIS — I10 ESSENTIAL HYPERTENSION: Primary | ICD-10-CM

## 2018-11-06 PROCEDURE — 99214 OFFICE O/P EST MOD 30 MIN: CPT | Performed by: NURSE PRACTITIONER

## 2018-11-06 PROCEDURE — 93786 AMBL BP MNTR W/SW REC ONLY: CPT

## 2018-11-06 NOTE — PROGRESS NOTES
Reason for Visit to The [facility]: 24hr. Ambulatory Blood Pressure Monitor.    Ms. Alebrt saw Provider Frances Pascual on 10/06/18. She order  a 24hr ambulatory blood pressure monitor for Dx:  Hypertension.      The monitor was placed on her Left  arm & she was instructed in its use and had no questions.      She was provided with written instructions to refer to as well.  She will wear the monitor until tomorrow around 11:00am.      She will bring it back to our office tomorrow or mail it to us for download.      Results will be forwarded to Frances Pascual for analysis and treatment adjustment if indicated.    Mari Barton 11/06/18 11:22 AM   [facility]

## 2018-11-06 NOTE — PATIENT INSTRUCTIONS
You will be called with an appointment for Dr Beckford  Please wear blood pressure monitor for 24 hours

## 2018-11-06 NOTE — PROGRESS NOTES
Encounter Date:11/06/2018      Patient ID: Nancy Albert is a 58 y.o. female.        Subjective:     Chief Complaint: Follow-up   HTN  History of Present Illness Patient presents to the office for ongoing evaluation of her hypertension. Patient has long history of HTN and has been on multiple agents in the past. Patient has been on cardura with no improvement in her blood pressure. She has also been on hydralazine in the past but notes that she felt terrible when she took it and reported worsening fatigue, chest tightness and a lump in her throat. She also noted intermittent lightheadedness.Patient notes that Dr Will increased her norvasc to 5 mg at one point but she noted that she experienced significant lightheadedness and notes that she always felt like she was going to pass out. At last office visit she was initiated on coreg 6.25 mg bid. She presents today with her home bp log which shows bps 140//77 hrs 70-80s. She notes that she has been experiencing dizziness with position changes while on the coreg. She also notes a lump in her throat and increased reflux. Denies cp, pedal edema.     Patient Active Problem List   Diagnosis   • Palpitations   • Heart murmur   • Hypertension   • NSVT (nonsustained ventricular tachycardia) (CMS/[facility])       Past Surgical History:   Procedure Laterality Date   • HYSTERECTOMY  07/15/2008   • OOPHORECTOMY Left 07/15/2008       No Known Allergies      Current Outpatient Prescriptions:   •  amLODIPine (NORVASC) 2.5 MG tablet, Take 2.5 mg by mouth Daily., Disp: , Rfl:   •  carvedilol (COREG) 6.25 MG tablet, Take 1 tablet by mouth 2 (Two) Times a Day., Disp: 60 tablet, Rfl: 11  •  losartan-hydrochlorothiazide (HYZAAR) 100-25 MG per tablet, Take 1 tablet by mouth Daily., Disp: , Rfl:     The following portions of the chart were reviewed and updated as appropriate: Allergies, current medications, past family history, social history, past medical history.     Review of Systems  "  Constitution: Negative for chills, decreased appetite, diaphoresis, fever, weakness, malaise/fatigue, night sweats, weight gain and weight loss.   HENT: Negative for congestion, hearing loss, hoarse voice and nosebleeds.         \"lump in her throat\"   Eyes: Negative for blurred vision, visual disturbance and visual halos.   Cardiovascular: Negative for chest pain, claudication, cyanosis, dyspnea on exertion, irregular heartbeat, leg swelling, near-syncope, orthopnea, palpitations, paroxysmal nocturnal dyspnea and syncope.   Respiratory: Positive for snoring. Negative for cough, hemoptysis, shortness of breath, sleep disturbances due to breathing, sputum production and wheezing.    Hematologic/Lymphatic: Negative for bleeding problem. Does not bruise/bleed easily.   Skin: Negative for dry skin, itching and rash.   Musculoskeletal: Negative for arthritis, falls, joint pain, joint swelling and myalgias.   Gastrointestinal: Positive for abdominal pain and heartburn. Negative for bloating, constipation, diarrhea, flatus, hematemesis, hematochezia, melena, nausea and vomiting.   Genitourinary: Negative for dysuria, frequency, hematuria, nocturia and urgency.   Neurological: Positive for headaches and light-headedness. Negative for excessive daytime sleepiness, dizziness and loss of balance.   Psychiatric/Behavioral: Negative for depression. The patient does not have insomnia and is not nervous/anxious.            Objective:     Vitals:    11/06/18 0938 11/06/18 0940   BP: 141/75 145/82   BP Location: Right arm Right arm   Patient Position: Sitting Standing   Cuff Size: Adult    Pulse: 70 64   Resp: 16    Temp: 97.5 °F (36.4 °C)    TempSrc: Temporal Artery     SpO2: 97%    Weight: 74.4 kg (164 lb)    Height: 165.1 cm (65\")          Physical Exam   Constitutional: She is oriented to person, place, and time. She appears well-developed and well-nourished. She is active and cooperative. No distress.   HENT:   Head: " Normocephalic and atraumatic.   Mouth/Throat: Oropharynx is clear and moist.   Eyes: Pupils are equal, round, and reactive to light. Conjunctivae and EOM are normal.   Neck: Normal range of motion. Neck supple. No JVD present. No tracheal deviation present. No thyromegaly present.   Cardiovascular: Normal rate, regular rhythm, normal heart sounds and intact distal pulses.    Pulmonary/Chest: Effort normal and breath sounds normal.   Abdominal: Soft. Bowel sounds are normal. She exhibits no distension. There is no tenderness.   Musculoskeletal: Normal range of motion.   Neurological: She is alert and oriented to person, place, and time.   Skin: Skin is warm, dry and intact.   Psychiatric: She has a normal mood and affect. Her behavior is normal.   Nursing note and vitals reviewed.      Lab and Diagnostic Review:      Lab Results   Component Value Date    GLUCOSE 100 09/02/2018    CALCIUM 9.7 09/02/2018     09/02/2018    K 3.4 (L) 09/02/2018    CO2 29.0 09/02/2018     09/02/2018    BUN 15 09/02/2018    CREATININE 0.85 09/02/2018    EGFRIFAFRI 83 09/02/2018    BCR 17.6 09/02/2018    ANIONGAP 11.0 09/02/2018     · Stress echo: Pt reported 2/10 midsternal chest pressure prior to exercise. Decreased to 1/10 and then disappeared during and after exercise.  · Expected exercise time: 7:05, actual time: 5:34  · Normal ECG stress ECG interpretation.  · Left ventricular systolic function is normal.  · Estimated EF appears to be in the range of 56 - 60%.  Normal stress echo with no significant echocardiographic evidence for myocardial ischemia.      Assessment and Plan:         1. Essential hypertension  Continue norvasc, hyzaar and coreg  - 24 Hour Blood Pressure Monitor  - Ambulatory Referral to Cardiology    2. NSVT (nonsustained ventricular tachycardia) (CMS/HCC)  Stress echo within normal limits  - Ambulatory Referral to Cardiology    3. Palpitations  Resolved    It has been a pleasure to participate in the care  of this patient.  Patient was instructed to call the [facility] with any questions, concerns, or worsening symptoms.        * Please note that portions of this note were completed with a voice recognition program. Efforts were made to edit the dictation but occasionally words are transcribed.

## 2018-11-15 ENCOUNTER — DOCUMENTATION (OUTPATIENT)
Dept: CARDIOLOGY | Facility: HOSPITAL | Age: 58
End: 2018-11-15

## 2018-11-15 NOTE — PROGRESS NOTES
[]A 24 Hr. Ambulatory Blood Pressure Monitor was worn By Mrs. Nancy Albert from 11/06/18 to 11/07/18. She returned the monitor and the results downloaded and forwarded to the ordering provider ANA Jackson for review and management of patient's symptoms. A copy of  The results was  also sent to Medical Records.     Mari Barton  [facility]      -----------------------------------------------------------------------    Reason for Visit to [facility]: 24hr. Ambulatory Blood Pressure Monitor.     Ms. Albert saw Provider Frances Pascual on 10/06/18. She order  a 24hr ambulatory blood pressure monitor for Dx:  Hypertension.       The monitor was placed on her Left  arm & she was instructed in its use and had no questions.       She was provided with written instructions to refer to as well.  She will wear the monitor until tomorrow around 11:00am.       She will bring it back to our office tomorrow or mail it to us for download.       Results will be forwarded to Frances Pascual for analysis and treatment adjustment if indicated.     Mari Barton 11/06/18 11:22 AM   [facility]

## 2019-01-02 ENCOUNTER — LAB (OUTPATIENT)
Dept: LAB | Facility: HOSPITAL | Age: 59
End: 2019-01-02

## 2019-01-02 ENCOUNTER — CONSULT (OUTPATIENT)
Dept: CARDIOLOGY | Facility: CLINIC | Age: 59
End: 2019-01-02

## 2019-01-02 VITALS
HEART RATE: 73 BPM | WEIGHT: 163.4 LBS | HEIGHT: 65 IN | SYSTOLIC BLOOD PRESSURE: 134 MMHG | DIASTOLIC BLOOD PRESSURE: 86 MMHG | BODY MASS INDEX: 27.22 KG/M2

## 2019-01-02 DIAGNOSIS — Z79.899 HIGH RISK MEDICATION USE: ICD-10-CM

## 2019-01-02 DIAGNOSIS — I47.29 NSVT (NONSUSTAINED VENTRICULAR TACHYCARDIA) (HCC): ICD-10-CM

## 2019-01-02 DIAGNOSIS — R00.2 PALPITATIONS: ICD-10-CM

## 2019-01-02 DIAGNOSIS — I11.9 HYPERTENSIVE HEART DISEASE WITHOUT HEART FAILURE: ICD-10-CM

## 2019-01-02 DIAGNOSIS — Z79.899 HIGH RISK MEDICATION USE: Primary | ICD-10-CM

## 2019-01-02 DIAGNOSIS — I10 ESSENTIAL HYPERTENSION: ICD-10-CM

## 2019-01-02 LAB
ANION GAP SERPL CALCULATED.3IONS-SCNC: 5 MMOL/L (ref 3–11)
BUN BLD-MCNC: 18 MG/DL (ref 9–23)
BUN/CREAT SERPL: 22 (ref 7–25)
CALCIUM SPEC-SCNC: 9.6 MG/DL (ref 8.7–10.4)
CHLORIDE SERPL-SCNC: 101 MMOL/L (ref 99–109)
CO2 SERPL-SCNC: 32 MMOL/L (ref 20–31)
CREAT BLD-MCNC: 0.82 MG/DL (ref 0.6–1.3)
GFR SERPL CREATININE-BSD FRML MDRD: 86 ML/MIN/1.73
GLUCOSE BLD-MCNC: 97 MG/DL (ref 70–100)
MAGNESIUM SERPL-MCNC: 2.1 MG/DL (ref 1.3–2.7)
POTASSIUM BLD-SCNC: 4.2 MMOL/L (ref 3.5–5.5)
SODIUM BLD-SCNC: 138 MMOL/L (ref 132–146)

## 2019-01-02 PROCEDURE — 99214 OFFICE O/P EST MOD 30 MIN: CPT | Performed by: INTERNAL MEDICINE

## 2019-01-02 PROCEDURE — 36415 COLL VENOUS BLD VENIPUNCTURE: CPT

## 2019-01-02 PROCEDURE — 93000 ELECTROCARDIOGRAM COMPLETE: CPT | Performed by: INTERNAL MEDICINE

## 2019-01-02 PROCEDURE — 80048 BASIC METABOLIC PNL TOTAL CA: CPT

## 2019-01-02 PROCEDURE — 83735 ASSAY OF MAGNESIUM: CPT

## 2019-01-02 NOTE — PROGRESS NOTES
Subjective:     Encounter Date:01/02/2019    Patient ID: Nancy Albert is a 58 y.o. single -American female from [place], [place], recently retired from the [employer].    PHYSICIAN: Franklin Will MD  SLEEP PHYSICIAN: [facility]  REFERRING HEALTHCARE PROVIDER: ANA Theodore    Chief Complaint:   Chief Complaint   Patient presents with   • Chest Pain   • Dizziness     Problem List:  1. Hypertensive cardiovascular disease:  a. Graded exercise test 2/11/16: The maximal exercise stress test negative by ST criteria.  No precordial  symptoms developed.  Normal systolic blood pressure response.  Normal exercise tolerance.  No ventricular ectopy recorded.  Clinical correlation required.  b. Remote 24-hour Holter monitor approximately 2016 with her physician: Normal-data deficit   c. Stress echocardiogram 10/12/18: Normal stress echo with no significant echocardiographic evidence for myocardial ischemia, EF 56-60%  d. Holter monitor 9/6/18: Abnormal monitor study with one episode of nonsustained V. tach, 6 beats in duration.  Occasional PVCs.  Rare, brief episodes of PAT.  Patient triggered event related was sinus rhythm  e. 24 hour ambulatory blood pressure monitor November 2018: Average blood pressure 100-120/60-70 torr, heart rate 60-70 bpm, maximum blood pressure 160/85  f. Residual class I symptoms  2. Hypertension  3. Apparent asymptomatic nonsustained ventricular tachycardia during hypokalemia, incidentally found on Holter monitor, September 2018  4. Palpitations  5. Cardiac murmur with acceptable echocardiogram, October 2018  6. At risk for sleep apnea with negative sleep study in 2017-data deficit  7. Surgical history: KATI, left oophorectomy    No Known Allergies    Current Outpatient Medications:   •  amLODIPine (NORVASC) 2.5 MG tablet, Take 2.5 mg by mouth Daily., Disp: , Rfl:   •  carvedilol (COREG) 6.25 MG tablet, Take 1 tablet by  "mouth 2 (Two) Times a Day., Disp: 60 tablet, Rfl: 11  •  losartan-hydrochlorothiazide (HYZAAR) 100-25 MG per tablet, Take 1 tablet by mouth Daily., Disp: , Rfl:     History of Present Illness  This is a 58-year-old -American female who was referred to us by ANA Theodore, for cardiology consultation.  The patient has had uncontrolled hypertension over the past several years.  She has tried multiple antihypertensive medications.  When she tried doxazosin, she felt like she had a \"lump in my throat,\" and she experienced presyncope/dizziness with amlodipine at 5 mg, as well as lisinopril.  She recently wore a 24-hour ambulatory blood pressure monitor which showed normal blood pressure and heart rates.  She had a stress echocardiogram which was normal.  In September 2018, she had an episode of presyncope/lightheadedness, tachycardia, and fatigue and presented to [facility] ED.  She was discharged with a Holter monitor which showed one episode of nonsustained ventricular tachycardia with occasional PVCs.  The patient notes that she had hypokalemia at this same ED visit.  She tries to eat more foods with potassium because she feels better when she does.  She retired in fall 2018 from the [employer] and has lost around 8 pounds since that time.  She had concerns in this regard but does note that she is making better food choices since retiring.  She is able to go up her stairs at home around 20 times a day without difficulties.  She line dances once a week but otherwise does not engage in much physical activity other than the normal household chores.  She denies any rheumatic fever or kidney dysfunction, chest pressure, shortness of breath, palpitations, syncope, GERD, COPD, asthma, smoking history, TIAs, seizures, CVAs, DVTs, PEs, sleep apnea, claudication, MIs, diabetes mellitus, thyroid dysfunction, PIH, preeclampsia, gestational diabetes, heart catheterizations, orthopnea, or " PND.  She developed hypertension about 7 years ago.  She has no family history of early CAD.  In the past, when the patient would have elevated blood pressure, she would have chest discomfort, fatigue, and a mild headache. Patient otherwise denies chest pain, shortness of breath, PND, edema, palpitations, syncope or presyncope at this time.      Cardiovascular Disease Risk Factors  hyptertension, increased age    Social History     Socioeconomic History   • Marital status: Single     Spouse name: Not on file   • Number of children: 1: Daughter who had preeclampsia in her pregnancy    • Years of education: Not on file   • Highest education level: Not on file   Social Needs   • Financial resource strain: Not on file   • Food insecurity - worry: Not on file   • Food insecurity - inability: Not on file   • Transportation needs - medical: Not on file   • Transportation needs - non-medical: Not on file   Occupational History   • Occupation: Retired   Tobacco Use   • Smoking status: Never Smoker   • Smokeless tobacco: Never Used   Substance and Sexual Activity   • Alcohol use: No   • Drug use: No   • Sexual activity: Defer   Other Topics Concern   • Not on file   Social History Narrative    Caffeine: None    Patient lives at her home with her 11 year old grandson       Family History   Problem Relation Age of Onset   • Cancer Mother    • Arthritis Father    • No Known Problems Sister    • No Known Problems Brother    • Pancreatic cancer Maternal Grandmother    • No Known Problems Maternal Grandfather    • No Known Problems Paternal Grandmother    • No Known Problems Paternal Grandfather    • No Known Problems Daughter    • Breast cancer Neg Hx    • Ovarian cancer Neg Hx    · 3 brothers and one sister with hypertension  · Father: Hypertension,   · Mother: Hypertension, liver cancer,   · Daughter: Preeclampsia during her pregnancy    Review of Systems   Constitution: Positive for weight loss (8 pounds since  "September 2018). Negative for weakness.   HENT: Negative.    Eyes: Negative.    Cardiovascular: Positive for chest pain, irregular heartbeat, near-syncope (September 2018) and palpitations. Negative for claudication, dyspnea on exertion, leg swelling, orthopnea, paroxysmal nocturnal dyspnea and syncope.   Respiratory: Positive for sleep disturbances due to breathing (negative sleep study in 2017). Negative for shortness of breath.    Endocrine: Negative.    Hematologic/Lymphatic: Negative.    Skin: Negative.    Musculoskeletal: Negative.    Gastrointestinal: Negative for heartburn and melena.   Genitourinary: Negative.    Neurological: Positive for dizziness and light-headedness. Negative for seizures.   Psychiatric/Behavioral: Negative.    Allergic/Immunologic: Negative.       Obtained and negative except as outlined in problem list and HPI.      ECG 12 Lead  Date/Time: 1/2/2019 11:48 AM  Performed by: Paramjit Beckford MD  Authorized by: Paramjit Beckford MD   Rhythm comments: Normal sinus rhythm, minimal voltage criteria for LVH, maybe normal variant, cannot rule out anterior infarct, age undetermined, abnormal ECG, 69 bpm, QRS 80 ms,  ms,  ms                 Objective:       Vitals:    01/02/19 1017 01/02/19 1018 01/02/19 1019 01/02/19 1020   BP: 139/85 133/86 134/81 134/86   BP Location: Right arm Right arm Left arm Left arm   Patient Position: Sitting Standing Sitting Standing   Pulse: 74 73 71 73   Weight: 74.1 kg (163 lb 6.4 oz)      Height: 165.1 cm (65\")      Recheck blood pressure left arm sitting was 122/64  Body mass index is 27.19 kg/m².     Physical Exam   Constitutional: She is oriented to person, place, and time. She appears well-developed and well-nourished.   HENT:   Mouth/Throat: Uvula is midline, oropharynx is clear and moist and mucous membranes are normal. She does not have dentures. No oral lesions. Normal dentition. No dental abscesses, uvula swelling, lacerations or dental " caries.   Eyes:   Fundoscopic exam:       The right eye shows AV nicking. The right eye shows no exudate and no hemorrhage.        The left eye shows AV nicking. The left eye shows no exudate and no hemorrhage.   Neck: No JVD present. Carotid bruit is not present. No thyromegaly present.   Cardiovascular: Regular rhythm, S1 normal and S2 normal. Exam reveals no gallop, no S3 and no friction rub.   Murmur heard.   Medium-pitched early systolic murmur is present with a grade of 2/6 at the lower left sternal border.  Pulses:       Dorsalis pedis pulses are 1+ on the right side, and 1+ on the left side.        Posterior tibial pulses are 1+ on the right side, and 1+ on the left side.   Pulmonary/Chest: Effort normal. She has decreased breath sounds. She has no wheezes. She has no rhonchi. She has no rales.   Abdominal: Soft. She exhibits no mass. There is no hepatosplenomegaly. There is no tenderness. There is no guarding.   Bowel sounds audible x4   Musculoskeletal: Normal range of motion. She exhibits no edema.   Lymphadenopathy:     She has no cervical adenopathy.   Neurological: She is alert and oriented to person, place, and time.   Skin: Skin is warm, dry and intact. No rash noted.   Vitals reviewed.       Lab Review:   Results for orders placed or performed during the hospital encounter of 10/12/18   Adult Stress Echo W/ Cont or Stress Agent if Necessary Per Protocol   Result Value Ref Range    BH CV STRESS PROTOCOL 1 Brian     Stage 1 1     Duration Min Stage 1 3     Duration Sec Stage 1 0     Grade Stage 1 10     Speed Stage 1 1.7     BH CV STRESS METS STAGE 1 5     Baseline HR 67 bpm    Baseline /82 mmHg    HR Stage 1 131     BP Stage 1 190/100     Peak /66 mmHg    Recovery O2 98 %    Exercise duration (min) 5 min    Exercise duration (sec) 34 sec    BSA 1.8 m^2    IVSd 0.99 cm    LVIDd 4.4 cm    LVIDs 2.8 cm    LVPWd 1.0 cm    IVS/LVPW 0.95     FS 35.0 %    EDV(Teich) 86.1 ml    ESV(Teich) 30.5 ml     EF(Teich) 64.5 %    EDV(cubed) 83.2 ml    ESV(cubed) 22.8 ml    EF(cubed) 72.5 %    LV mass(C)d 148.6 grams    LV mass(C)dI 81.1 grams/m^2    SV(Teich) 55.6 ml    SI(Teich) 30.3 ml/m^2    SV(cubed) 60.4 ml    SI(cubed) 33.0 ml/m^2    TR max neida 252.3 cm/sec     CV ECHO EDWIN - TR MAX PG 26.0 mmHg    RVSP(TR) 29.0 mmHg    RAP systole 3.0 mmHg     CV ECHO EDWIN - BZI_BMI 27.8 kilograms/m^2     CV ECHO EDWIN - BSA(HAYCOCK) 1.9 m^2     CV ECHO EDWIN - BZI_METRIC_WEIGHT 75.8 kg     CV ECHO EDWIN - BZI_METRIC_HEIGHT 165.1 cm    Target HR (85%) 138 bpm    Max. Pred. HR (100%) 162 bpm     CV VAS BP RIGHT /82 mmHg    O2 Stage 1 95     Stage 2 2     HR Stage 2 142     O2 Stage 2 94     Duration Min Stage 2 1     Duration Sec Stage 2 34     Grade Stage 2 12     Speed Stage 2 2.5     BH CV STRESS METS STAGE 2 7.5     O2 sat rest 98 %    Peak  bpm    Percent Max Pred HR 88.89 %    Percent Target  %    O2 sat peak 94 %    Recovery HR 74 bpm    Recovery /70 mmHg    Estimated workload 7.0 METS   · Holter monitor 9/6/18: Abnormal monitor study with one episode of nonsustained V. tach, 6 beat in duration.  Occasional PVCs.  Rare, brief episodes of paroxysmal atrial tachycardia.  Patient triggered events correlated with sinus rhythm.    · Stress echocardiogram 10/12/18:  · Pt reported 2/10 midsternal chest pressure prior to exercise. Decreased to 1/10 and then disappeared during and after exercise.  · Expected exercise time: 7:05, actual time: 5:34  · Normal ECG stress ECG interpretation.  · Left ventricular systolic function is normal.  · Estimated EF appears to be in the range of 56 - 60%.  · Normal stress echo with no significant echocardiographic evidence for myocardial ischemia.    · ECG, 3/16/18: Sinus rhythm, left atrial enlargement, voltage criteria for LVH-voltage criteria without ST/T abnormality may be normal, old anteroseptal infarct, 100 bpm,  ms,  ms, QRS 90 ms           Assessment:   Patient with hypertension that is now controlled with multiple antihypertensive medication sensitivities.  She had a presyncopal episode in September 2018 in the setting of hypokalemia and wore a Holter monitor which demonstrated one episode of nonsustained V. tach and occasional PVCs.  Her stress echocardiogram was normal, and her ambulatory blood pressure monitor had acceptable blood pressures with average around 120 systolic and heart rates in the 60s-70s.  We will order a BMP and magnesium to assess for further hypokalemia.     Diagnosis Plan   1. High risk medication use  Basic Metabolic Panel    Magnesium   2. Hypertensive heart disease without heart failure  Stable; No recurrent angina pectoris or CHF on current activity schedule; continue current treatment     3. Essential hypertension  Controlled   4. Palpitations  Controlled   5. NSVT (nonsustained ventricular tachycardia) (CMS/HCC)  Stable; no documented recurrence          Plan:       1. Patient to continue current medications and close follow up with the above providers; she will monitor her blood pressure and heart rate and update us in 2-3 weeks.  2. Tentative cardiology follow up in March 2019, or patient may return sooner PRN.  3. BMP, magnesium ordered  4. 1 800 card    Scribed for Paramjit Beckford MD by Jennifer Ko, APRN. 1/2/2019  1:47 PM    I, Paramjit Beckford MD, [facility], personally performed the services described in this documentation as scribed by the above named individual in my presence, and it is both accurate and complete. At 1:42 PM on 01/02/2019

## 2019-01-25 ENCOUNTER — TELEPHONE (OUTPATIENT)
Dept: CARDIOLOGY | Facility: CLINIC | Age: 59
End: 2019-01-25

## 2019-01-25 RX ORDER — AMLODIPINE BESYLATE 5 MG/1
5 TABLET ORAL DAILY
Qty: 30 TABLET | Refills: 1 | Status: SHIPPED | OUTPATIENT
Start: 2019-01-25 | End: 2019-03-15 | Stop reason: SDUPTHER

## 2019-01-25 NOTE — TELEPHONE ENCOUNTER
Patient called with concerns about BP medication.     Patient is currently taking Carvedilol 6.25 mg BID.     She complains of chest and shoulder pain, headache, and being light headed all day. She states that she just feels off.    Patient stated that her BP was 140/86 yesterday.    Please advise.

## 2019-01-25 NOTE — TELEPHONE ENCOUNTER
Called patient left message telling her she can stop taking the carvedilol and may increase her amlodipine 5 mg daily, per KTS, and to update us with a BP log in 1 week.

## 2019-01-28 NOTE — TELEPHONE ENCOUNTER
Amlodipine 5 mg caused dizziness in the past and PT does not wish to try 5 mg again- she does not think that her BP machine is accurate- she will check her BP and HR with a friends machine and cll back with readings -

## 2019-02-05 ENCOUNTER — TELEPHONE (OUTPATIENT)
Dept: CARDIOLOGY | Facility: CLINIC | Age: 59
End: 2019-02-05

## 2019-02-13 ENCOUNTER — OFFICE VISIT (OUTPATIENT)
Dept: CARDIOLOGY | Facility: CLINIC | Age: 59
End: 2019-02-13

## 2019-02-13 VITALS
SYSTOLIC BLOOD PRESSURE: 107 MMHG | BODY MASS INDEX: 26.82 KG/M2 | HEIGHT: 65 IN | DIASTOLIC BLOOD PRESSURE: 72 MMHG | WEIGHT: 161 LBS | HEART RATE: 92 BPM

## 2019-02-13 DIAGNOSIS — R00.2 PALPITATIONS: Primary | ICD-10-CM

## 2019-02-13 DIAGNOSIS — I10 ESSENTIAL HYPERTENSION: ICD-10-CM

## 2019-02-13 DIAGNOSIS — R01.1 HEART MURMUR: ICD-10-CM

## 2019-02-13 PROCEDURE — 99214 OFFICE O/P EST MOD 30 MIN: CPT | Performed by: INTERNAL MEDICINE

## 2019-02-13 NOTE — PROGRESS NOTES
Subjective:     Encounter Date:02/13/2019    Patient ID: Nancy Albert is a 58 y.o. single -American female from [place], [place], recently retired from the [employer].     PHYSICIAN: Franklin Will MD  SLEEP PHYSICIAN: [facility]  REFERRING HEALTHCARE PROVIDER: ANA Theodore    Chief Complaint:   Chief Complaint   Patient presents with   • Hypertension   • Palpitations   • Chest Pain     Problem List:  1. Hypertensive cardiovascular disease:  a. Graded exercise test 2/11/16: The maximal exercise stress test negative by ST criteria.  No precordial  symptoms developed.  Normal systolic blood pressure response.  Normal exercise tolerance.  No ventricular ectopy recorded.  Clinical correlation required.  b. Remote 24-hour Holter monitor approximately 2016 with her physician: Normal-data deficit   c. Stress echocardiogram 10/12/18: Normal stress echo with no significant echocardiographic evidence for myocardial ischemia, EF 56-60%  d. Holter monitor 9/6/18: Abnormal monitor study with one episode of nonsustained V. tach, 6 beats in duration.  Occasional PVCs.  Rare, brief episodes of PAT.  Patient triggered event related was sinus rhythm  e. 24 hour ambulatory blood pressure monitor November 2018: Average blood pressure 100-120/60-70 torr, heart rate 60-70 bpm, maximum blood pressure 160/85  f. Residual class I symptoms  2. Hypertension  3. Apparent asymptomatic nonsustained ventricular tachycardia during hypokalemia, incidentally found on Holter monitor, September 2018  4. Palpitations  5. Cardiac murmur with acceptable echocardiogram, October 2018  6. At risk for sleep apnea with negative sleep study in 2017-data deficit  7. Surgical history: KATI, left oophorectomy    No Known Allergies      Current Outpatient Medications:   •  amLODIPine (NORVASC) 5 MG tablet, Take 1 tablet by mouth Daily. (Patient taking differently: Take 2.5 mg by mouth  "Daily.), Disp: 30 tablet, Rfl: 1  •  losartan-hydrochlorothiazide (HYZAAR) 100-25 MG per tablet, Take 1 tablet by mouth Daily., Disp: , Rfl:     HISTORY OF PRESENT ILLNESS:  Patient is here for a 6 week follow-up.  She was not able to tolerate the Coreg due to dizziness and body aches.  She has discontinued it and was advised to increase her amlodipine to 5 mg daily.  She states in the past she has tried the 5 mg dosage, and she was not able to tolerate this so did not increase her dose.  When she has monitored her blood pressure at home, the readings have been in the 150s systolic.  She had questions because her blood pressure in office today was within normal limits and she is wondering if her blood pressure monitor is dysfunctional.  She had recent laboratory testing with her physician and was told that her values were acceptable - data deficit.  She still has occasional dizziness and also has complaints of feeling a \"lump in her throat.\"  She notices this more after she has eaten and will occasionally have some epigastric chest pain after she eats.  She has never had GERD in the past and has not had an EGD.  She had a couple of palpitations after discontinuing Coreg but has not had any that lasted very long.  She has adjusted her diet and is eating smaller meals more frequently and has discontinued the feeling that she gets.  She has concerns because she has done this and lost 10 pounds since the fall 2018.  Patient otherwise denies chest pain, shortness of breath, PND, edema, persistent palpitations, syncope or presyncope at this time on limited activity.      Review of Systems   Cardiovascular: Positive for chest pain.   Respiratory: Positive for snoring.    Musculoskeletal: Positive for neck pain and stiffness.   Gastrointestinal: Positive for heartburn.   Neurological: Positive for headaches.      Obtained and otherwise negative except as outlined in problem list and HPI.    Procedures       Objective:     " "  Vitals:    02/13/19 1355 02/13/19 1356   BP: 121/87 107/72   BP Location: Left arm Left arm   Patient Position: Sitting Standing   Pulse: 92 92   Weight: 73 kg (161 lb) 73 kg (161 lb)   Height: 165.1 cm (65\") 165.1 cm (65\")   Last weight January 2019 was 163 pounds  Body mass index is 26.79 kg/m².    Physical Exam   Constitutional: She is oriented to person, place, and time. She appears well-developed and well-nourished.   Neck: No JVD present. Carotid bruit is not present. No thyromegaly present.   Cardiovascular: Regular rhythm, S1 normal and S2 normal. Exam reveals no gallop, no S3 and no friction rub.   Murmur heard.   Medium-pitched harsh early systolic murmur is present with a grade of 2/6 at the lower left sternal border radiating to the neck.  Pulses:       Dorsalis pedis pulses are 1+ on the right side, and 1+ on the left side.        Posterior tibial pulses are 1+ on the right side, and 1+ on the left side.   Pulmonary/Chest: Effort normal. She has decreased breath sounds. She has no wheezes. She has no rhonchi. She has no rales.   Abdominal: Soft. She exhibits no mass. There is no hepatosplenomegaly. There is no tenderness. There is no guarding.   Bowel sounds audible x4   Musculoskeletal: Normal range of motion. She exhibits no edema.   Lymphadenopathy:     She has no cervical adenopathy.   Neurological: She is alert and oriented to person, place, and time.   Skin: Skin is warm, dry and intact. No rash noted.   Vitals reviewed.        Lab Review:   Lab Results   Component Value Date    GLUCOSE 97 01/02/2019    BUN 18 01/02/2019    CREATININE 0.82 01/02/2019    EGFRIFAFRI 86 01/02/2019    BCR 22.0 01/02/2019    CO2 32.0 (H) 01/02/2019    CALCIUM 9.6 01/02/2019    ALBUMIN 4.83 (H) 09/02/2018    AST 19 09/02/2018    ALT 14 09/02/2018       Lab Results   Component Value Date    WBC 4.85 09/02/2018    HGB 12.4 09/02/2018    HCT 40.2 09/02/2018    MCV 85.5 09/02/2018     09/02/2018         Assessment: "   Patient's hypertension is controlled in office today but the patient feels that her blood pressure monitor may be dysfunctional.  We asked the patient to monitor her blood pressure for 2 weeks and bring in her readings as well as her blood pressure monitor in 2 weeks to compare with ours.  She had recent laboratory testing with her physician, and we encouraged the patient to have these results faxed to us for review.  If blood pressure remains elevated, would consider plasma metanephrines, PRA, and aldosterone level.     Diagnosis Plan   1. Palpitations  Stable   2. Essential hypertension  Controlled, patient to continue monitoring bp and bring in her readings and monitor in 2 weeks   3. Heart murmur  Stable          Plan:         1. Patient to continue current medications and close follow up with the above providers.  2. Tentative cardiology follow up in June 2019, or patient may return sooner PRN.  3. Patient to have her recent laboratory results faxed to us  4. Patient to monitor her bp at home and provide us with her bp readings; she will come in on Monday, 02/18/2019, to ensure her home blood pressure monitor is calibrated and obtaining accurate measurements.    Scribed for Paramjit Beckford MD by Jennifer Ko, APRN. 2/13/2019  2:24 PM    I, Paramjit Beckford MD, [facility], personally performed the services described in this documentation as scribed by the above named individual in my presence, and it is both accurate and complete. At 2:51 PM on 02/13/2019

## 2019-02-21 ENCOUNTER — CLINICAL SUPPORT (OUTPATIENT)
Dept: CARDIOLOGY | Facility: CLINIC | Age: 59
End: 2019-02-21

## 2019-02-21 ENCOUNTER — TELEPHONE (OUTPATIENT)
Dept: CARDIOLOGY | Facility: CLINIC | Age: 59
End: 2019-02-21

## 2019-02-21 DIAGNOSIS — I47.29 NSVT (NONSUSTAINED VENTRICULAR TACHYCARDIA) (HCC): Primary | ICD-10-CM

## 2019-02-21 DIAGNOSIS — R00.2 PALPITATIONS: ICD-10-CM

## 2019-02-21 NOTE — TELEPHONE ENCOUNTER
"Patient walked in for BP check.  States on the way to the office had an episode of \"heart racing,\" \"twinge of pain in the chest,\" and was \"a little SOA.\"  All symptoms have now resolved.  EKG was done, show SR - rate 100.    Vitals: sitting - /96,   Standing - 148/94,     Ernestina Case PAC reviewed EKG.  I rechecked BP against her cuff from home.      Office reading: /99, HR 94  Home cuff reading: /95, HR 92    Patient states she drinks \"plenty\" of fluids.  She has not felt well since around December.  States BP chronically high.  She also complains of a \"lump\" in her throat that \"comes and goes.\"    Advised patient she may take extra dose of amlodipine 2.5mg early, and check BP before nighttime administration.  As long as SBP > 130 she could take night dose as well.  Continue to monitor.    Patient states she is not sure if she wants to take the dose early.  Will await recs from Dr. Beckford.  "

## 2019-02-21 NOTE — PROGRESS NOTES
ECG AND BP CHECK VISIT    BP Left Arm Sitting on clinic automatic cuff 160/96 pulse 106    BP Left Arm Standing on clinic automatic cuff 148/94 pulse 106    BP Left Arm Sitting on patient automatic cuff 178/113 pulse 102

## 2019-02-22 NOTE — TELEPHONE ENCOUNTER
Per Dr. Beckford - take amlodipine 2.5mg BID.  Continue all other meds.  Move up f/u in 2-3 weeks.  EKG - WNL.    Patient notified.  Request to scheduling sent.

## 2019-02-28 ENCOUNTER — TELEPHONE (OUTPATIENT)
Dept: CARDIOLOGY | Facility: CLINIC | Age: 59
End: 2019-02-28

## 2019-02-28 NOTE — TELEPHONE ENCOUNTER
Ms Albert is calling bc she does not see an improvement since the increase in her amlodipine more than a week ago.     Pt c/o Headaches, Lump feeling in her throat, lightheaded, and a dull ache/pressure in her chest when her BP is elevated.     BP Hx.   Taken at @ 1pm    2/21  158/97 102  02/22  172/98 92  02/25  153/99 80  02/26  159/98 80  02/27  170/98 81  02/28  156/88 99    Pt consistently takes the amlodipine @ 9am & 8pm.

## 2019-03-15 ENCOUNTER — OFFICE VISIT (OUTPATIENT)
Dept: CARDIOLOGY | Facility: CLINIC | Age: 59
End: 2019-03-15

## 2019-03-15 VITALS
BODY MASS INDEX: 26.99 KG/M2 | HEART RATE: 68 BPM | HEIGHT: 65 IN | SYSTOLIC BLOOD PRESSURE: 117 MMHG | WEIGHT: 162 LBS | DIASTOLIC BLOOD PRESSURE: 67 MMHG

## 2019-03-15 DIAGNOSIS — I10 ESSENTIAL HYPERTENSION: ICD-10-CM

## 2019-03-15 DIAGNOSIS — R07.89 ATYPICAL CHEST PAIN: ICD-10-CM

## 2019-03-15 DIAGNOSIS — I47.29 NSVT (NONSUSTAINED VENTRICULAR TACHYCARDIA) (HCC): ICD-10-CM

## 2019-03-15 DIAGNOSIS — I11.9 HYPERTENSIVE HEART DISEASE WITHOUT HEART FAILURE: Primary | ICD-10-CM

## 2019-03-15 DIAGNOSIS — R00.2 PALPITATIONS: ICD-10-CM

## 2019-03-15 PROCEDURE — 99214 OFFICE O/P EST MOD 30 MIN: CPT | Performed by: INTERNAL MEDICINE

## 2019-03-15 RX ORDER — NEBIVOLOL 5 MG/1
5 TABLET ORAL DAILY
Qty: 90 TABLET | Refills: 3 | Status: SHIPPED | OUTPATIENT
Start: 2019-03-15 | End: 2019-05-06 | Stop reason: ALTCHOICE

## 2019-03-15 RX ORDER — AMLODIPINE BESYLATE 5 MG/1
5 TABLET ORAL 2 TIMES DAILY
Qty: 90 TABLET | Refills: 3 | Status: SHIPPED | OUTPATIENT
Start: 2019-03-15 | End: 2019-03-15 | Stop reason: SDUPTHER

## 2019-03-15 RX ORDER — NEBIVOLOL 5 MG/1
5 TABLET ORAL DAILY
COMMUNITY
End: 2019-03-15 | Stop reason: SDUPTHER

## 2019-03-15 RX ORDER — AMLODIPINE BESYLATE 5 MG/1
5 TABLET ORAL 2 TIMES DAILY
Qty: 180 TABLET | Refills: 3 | Status: ON HOLD | OUTPATIENT
Start: 2019-03-15 | End: 2019-05-19 | Stop reason: SDUPTHER

## 2019-03-15 NOTE — PROGRESS NOTES
Subjective:     Encounter Date:03/15/2019    Patient ID: Nancy Albert is a 58 y.o. single -American female from [place], [place], recently retired from the [employer].     PHYSICIAN: Franklin Will MD  SLEEP PHYSICIAN: [facility]  REFERRING HEALTHCARE PROVIDER: ANA Theodore    Chief Complaint:   Chief Complaint   Patient presents with   • Palpitations     not having any more      Problem List:  1. Hypertensive cardiovascular disease:  a. Graded exercise test 2/11/16: The maximal exercise stress test negative by ST criteria.  No precordial  symptoms developed.  Normal systolic blood pressure response.  Normal exercise tolerance.  No ventricular ectopy recorded.  Clinical correlation required.  b. Remote 24-hour Holter monitor approximately 2016 with her physician: Normal-data deficit   c. Stress echocardiogram 10/12/18: Normal stress echo with no significant echocardiographic evidence for myocardial ischemia, EF 56-60%  d. Holter monitor 9/6/18: Abnormal monitor study with one episode of nonsustained V. tach, 6 beats in duration.  Occasional PVCs.  Rare, brief episodes of PAT.  Patient triggered event related was sinus rhythm  e. 24 hour ambulatory blood pressure monitor November 2018: Average blood pressure 100-120/60-70 torr, heart rate 60-70 bpm, maximum blood pressure 160/85  f. Residual class I symptoms with intermittent random atypical chest pain, March 2019  2. Hypertension  3. Apparent asymptomatic nonsustained ventricular tachycardia during hypokalemia, incidentally found on Holter monitor, September 2018  4. Palpitations with Holter Monitor (9/6/18): Abnormal monitor study with one episode of nonsustained V. tach, 6 beats in duration.  Occasional PVCs.  Rare, brief episodes of PAT.   5. Cardiac murmur with acceptable echocardiogram, October 2018  6. At risk for sleep apnea with negative sleep study in 2017-data  deficit  7. Surgical history: KATI, left oophorectomy       No Known Allergies      Current Outpatient Medications:   •  amLODIPine (NORVASC) 5 MG tablet, Take 1 tablet by mouth 2 (Two) Times a Day., Disp: 90 tablet, Rfl: 3  •  losartan-hydrochlorothiazide (HYZAAR) 100-25 MG per tablet, Take 1 tablet by mouth Daily., Disp: , Rfl:   •  nebivolol (BYSTOLIC) 5 MG tablet, Take 1 tablet by mouth Daily., Disp: 90 tablet, Rfl: 3    HISTORY OF PRESENT ILLNESS: Patient returns today for followup of her uncontrolled high blood pressure.  Since last visit, she has been started on Bystolic, with her amlodipine dose increased.  She has been monitoring her blood pressures at home, and her systolic ranges from the 130s to the 150s and diastolics in the 70-90s.  She has only been on the Bystolic for approximately 10 days.  She is checking her blood pressure prior to having taken all of her morning meds.  She tends to space out all 3 of her medications over the course of the first few hours of the morning.  She underwent a stress echocardiogram last fall that was normal without evidence of ischemia and a normal LVEF.  Last fall, she also wore a Holter monitor for palpitations, and it showed one episode of a 6-beat run of nonsustained ventricular tachycardia and occasional PVCs.  All patient-triggered events were related to normal sinus rhythm.  The patient reports her palpitations have resolved, but she still experiences a dull ache in her chest that radiates into her neck and causes pain in between her shoulder blades.  The symptoms have been ongoing for the last couple of months.  They do not occur daily but usually weekly.  They are not related to exertional activity and can occur at rest.  She denies increased shortness of breath, orthopnea, presyncope or syncope.  She states the chest discomfort is very mild and not limiting.        Review of Systems   Cardiovascular: Positive for chest pain.   Musculoskeletal: Positive for  "arthritis.   Neurological: Positive for headaches.      Obtained and otherwise negative except as outlined in problem list and HPI.    Procedures       Objective:       Vitals:    03/15/19 1108 03/15/19 1111   BP: 126/76 117/67   BP Location: Left arm Left arm   Patient Position: Sitting Standing   Pulse: 68 68   Weight: 73.5 kg (162 lb)    Height: 165.1 cm (65\")      Body mass index is 26.96 kg/m².   Last weight:  161 lbs.    Physical Exam   Constitutional: She is oriented to person, place, and time. She appears well-developed and well-nourished.   Neck: No JVD present. Carotid bruit is not present. No thyromegaly present.   Cardiovascular: Regular rhythm, S1 normal, S2 normal and normal heart sounds. Exam reveals no gallop, no S3 and no friction rub.   No murmur heard.  Pulses:       Dorsalis pedis pulses are 2+ on the right side, and 2+ on the left side.        Posterior tibial pulses are 2+ on the right side, and 2+ on the left side.   Pulmonary/Chest: Effort normal and breath sounds normal. She has no wheezes. She has no rhonchi. She has no rales.   Abdominal: Soft. She exhibits no mass. There is no hepatosplenomegaly. There is no tenderness. There is no guarding.   Bowel sounds audible x4   Musculoskeletal: Normal range of motion. She exhibits no edema.   Lymphadenopathy:     She has no cervical adenopathy.   Neurological: She is alert and oriented to person, place, and time.   Skin: Skin is warm, dry and intact. No rash noted.   Vitals reviewed.        Lab Review:   Lab Results   Component Value Date    GLUCOSE 97 01/02/2019    BUN 18 01/02/2019    CREATININE 0.82 01/02/2019    EGFRIFAFRI 86 01/02/2019    BCR 22.0 01/02/2019    CO2 32.0 (H) 01/02/2019    CALCIUM 9.6 01/02/2019    ALBUMIN 4.83 (H) 09/02/2018    AST 19 09/02/2018    ALT 14 09/02/2018       Lab Results   Component Value Date    WBC 4.85 09/02/2018    HGB 12.4 09/02/2018    HCT 40.2 09/02/2018    MCV 85.5 09/02/2018     09/02/2018         "   Assessment:   The patient's blood pressure is decreasing.  Today her blood pressure is within normal limits at 117/67.  The patient's palpitations have resolved.  Her chest discomfort is atypical for being related to her heart.  It is reassuring that she had a normal stress echo this past fall.  We will continue her current medications.  She will follow-up in September 2019 or sooner if needed.  I have discussed with her the option of using nitroglycerin, as well as pursuing diagnostic coronary angiography for more definitive assessment of her chest pain, including discussion of the procedure, risks, and potential complications.  She does not wish to pursue either of these intervention at this time but will let us know if her symptoms increase in frequency or severity.     Diagnosis Plan   1. Hypertensive heart disease without heart failure  No recurrent angina pectoris or CHF on current activity schedule; continue current treatment     2. Essential hypertension  Acceptable control currently   3. Palpitations  No persistent symptoms   4. NSVT (nonsustained ventricular tachycardia) (CMS/HCC)  No documented recurrence with acceptable systolic LV function   5. Atypical chest pain  See above          Plan:         1. Patient to continue current medications and close follow up with the above providers.  2. Tentative cardiology follow up in September 2019, or patient may return sooner PRN.       Aileen PEREZ.  Scribe for Dr. Paramjit Beckford    I, Paramjit Beckford MD, [facility], personally performed the services described in this documentation as scribed by the above named individual in my presence, and it is both accurate and complete. At 3:00 PM on 03/15/2019

## 2019-04-26 ENCOUNTER — TRANSCRIBE ORDERS (OUTPATIENT)
Dept: ADMINISTRATIVE | Facility: HOSPITAL | Age: 59
End: 2019-04-26

## 2019-04-26 DIAGNOSIS — R10.9 ABDOMINAL PAIN, UNSPECIFIED ABDOMINAL LOCATION: Primary | ICD-10-CM

## 2019-05-02 ENCOUNTER — TELEPHONE (OUTPATIENT)
Dept: CARDIOLOGY | Facility: CLINIC | Age: 59
End: 2019-05-02

## 2019-05-02 NOTE — TELEPHONE ENCOUNTER
Patient called to report insurance will not cover Bystolic, she states she has tried Carvedilol which stopped working.  Is getting good blood pressure control with Bystolic.  PA attempted, they require trial of 2-3 formularies:    Atenolol  Bisoprolol  Carvedilol  Metoprolol Tartrate  Metoprolol Succinate ER  Please advise.

## 2019-05-03 NOTE — TELEPHONE ENCOUNTER
Left VM for patient, advising KTS recommends bisoprolol 10 mg daily and to call back with pharmacy info.

## 2019-05-06 RX ORDER — BISOPROLOL FUMARATE 10 MG/1
10 TABLET, FILM COATED ORAL DAILY
Qty: 30 TABLET | Refills: 11 | Status: SHIPPED | OUTPATIENT
Start: 2019-05-06 | End: 2019-05-19 | Stop reason: HOSPADM

## 2019-05-06 NOTE — TELEPHONE ENCOUNTER
Called patient and let her know the recommendations per KTS: (see note below)    Patient stated that she talked to her PCP again, and they are going to do some GI studies next week.

## 2019-05-06 NOTE — TELEPHONE ENCOUNTER
Use of sublingual nitroglycerin as well as the option of diagnostic coronary angiography was discussed at length with the patient during her follow-up with us in office 2 months ago on March 2019; she declined both.  Would consider a trial of Imdur 30 mg daily as well as NTG as needed; if persistent progressive symptoms develop she will need to consider cardiac catheterization studies as recommended previously.

## 2019-05-06 NOTE — TELEPHONE ENCOUNTER
Bisoprolol 10 mg daily sent to pharmacy on file.   Patient states that she is still have chest and back pains.    Patient wants to know what can be done about that. She has spoken with her PCP and he thought it could be acid reflux, but she said that it doesn't feel like that.    Please advise on what you would like to do.

## 2019-05-13 ENCOUNTER — HOSPITAL ENCOUNTER (OUTPATIENT)
Dept: ULTRASOUND IMAGING | Facility: HOSPITAL | Age: 59
Discharge: HOME | End: 2019-05-13
Admitting: FAMILY MEDICINE

## 2019-05-13 ENCOUNTER — HOSPITAL ENCOUNTER (OUTPATIENT)
Dept: GENERAL RADIOLOGY | Facility: HOSPITAL | Age: 59
Discharge: HOME | End: 2019-05-13

## 2019-05-13 DIAGNOSIS — R10.9 ABDOMINAL PAIN, UNSPECIFIED ABDOMINAL LOCATION: ICD-10-CM

## 2019-05-13 PROCEDURE — 74241: CPT

## 2019-05-13 PROCEDURE — 76705 ECHO EXAM OF ABDOMEN: CPT

## 2019-05-13 RX ADMIN — BARIUM SULFATE 183 ML: 960 POWDER, FOR SUSPENSION ORAL at 11:00

## 2019-05-18 ENCOUNTER — HOSPITAL ENCOUNTER (OUTPATIENT)
Facility: HOSPITAL | Age: 59
Setting detail: OBSERVATION
Discharge: HOME | End: 2019-05-19
Attending: EMERGENCY MEDICINE | Admitting: INTERNAL MEDICINE

## 2019-05-18 ENCOUNTER — APPOINTMENT (OUTPATIENT)
Dept: GENERAL RADIOLOGY | Facility: HOSPITAL | Age: 59
End: 2019-05-18

## 2019-05-18 ENCOUNTER — APPOINTMENT (OUTPATIENT)
Dept: CT IMAGING | Facility: HOSPITAL | Age: 59
End: 2019-05-18

## 2019-05-18 DIAGNOSIS — R07.9 CHEST PAIN, UNSPECIFIED TYPE: Primary | ICD-10-CM

## 2019-05-18 DIAGNOSIS — I10 HYPERTENSION, UNSPECIFIED TYPE: ICD-10-CM

## 2019-05-18 LAB
ALBUMIN SERPL-MCNC: 4.4 G/DL (ref 3.5–5.2)
ALBUMIN/GLOB SERPL: 1.3 G/DL
ALP SERPL-CCNC: 69 U/L (ref 39–117)
ALT SERPL W P-5'-P-CCNC: 9 U/L (ref 1–33)
ANION GAP SERPL CALCULATED.3IONS-SCNC: 12 MMOL/L
AST SERPL-CCNC: 16 U/L (ref 1–32)
BASOPHILS # BLD AUTO: 0.02 10*3/MM3 (ref 0–0.2)
BASOPHILS NFR BLD AUTO: 0.3 % (ref 0–1.5)
BILIRUB SERPL-MCNC: 0.3 MG/DL (ref 0.2–1.2)
BUN BLD-MCNC: 15 MG/DL (ref 6–20)
BUN/CREAT SERPL: 19 (ref 7–25)
CALCIUM SPEC-SCNC: 9.5 MG/DL (ref 8.6–10.5)
CHLORIDE SERPL-SCNC: 100 MMOL/L (ref 98–107)
CO2 SERPL-SCNC: 27 MMOL/L (ref 22–29)
CREAT BLD-MCNC: 0.79 MG/DL (ref 0.57–1)
DEPRECATED RDW RBC AUTO: 40.8 FL (ref 37–54)
EOSINOPHIL # BLD AUTO: 0.16 10*3/MM3 (ref 0–0.4)
EOSINOPHIL NFR BLD AUTO: 2.4 % (ref 0.3–6.2)
ERYTHROCYTE [DISTWIDTH] IN BLOOD BY AUTOMATED COUNT: 12.7 % (ref 12.3–15.4)
GFR SERPL CREATININE-BSD FRML MDRD: 90 ML/MIN/1.73
GLOBULIN UR ELPH-MCNC: 3.3 GM/DL
GLUCOSE BLD-MCNC: 99 MG/DL (ref 65–99)
HCT VFR BLD AUTO: 40.4 % (ref 34–46.6)
HGB BLD-MCNC: 13 G/DL (ref 12–15.9)
HOLD SPECIMEN: NORMAL
HOLD SPECIMEN: NORMAL
IMM GRANULOCYTES # BLD AUTO: 0.01 10*3/MM3 (ref 0–0.05)
IMM GRANULOCYTES NFR BLD AUTO: 0.2 % (ref 0–0.5)
LIPASE SERPL-CCNC: 53 U/L (ref 13–60)
LYMPHOCYTES # BLD AUTO: 2.93 10*3/MM3 (ref 0.7–3.1)
LYMPHOCYTES NFR BLD AUTO: 44.1 % (ref 19.6–45.3)
MCH RBC QN AUTO: 28 PG (ref 26.6–33)
MCHC RBC AUTO-ENTMCNC: 32.2 G/DL (ref 31.5–35.7)
MCV RBC AUTO: 86.9 FL (ref 79–97)
MONOCYTES # BLD AUTO: 0.37 10*3/MM3 (ref 0.1–0.9)
MONOCYTES NFR BLD AUTO: 5.6 % (ref 5–12)
NEUTROPHILS # BLD AUTO: 3.17 10*3/MM3 (ref 1.7–7)
NEUTROPHILS NFR BLD AUTO: 47.6 % (ref 42.7–76)
NT-PROBNP SERPL-MCNC: 36.7 PG/ML (ref 5–900)
PLATELET # BLD AUTO: 231 10*3/MM3 (ref 140–450)
PMV BLD AUTO: 10.4 FL (ref 6–12)
POTASSIUM BLD-SCNC: 3.6 MMOL/L (ref 3.5–5.2)
PROT SERPL-MCNC: 7.7 G/DL (ref 6–8.5)
RBC # BLD AUTO: 4.65 10*6/MM3 (ref 3.77–5.28)
SODIUM BLD-SCNC: 139 MMOL/L (ref 136–145)
TROPONIN T SERPL-MCNC: <0.01 NG/ML (ref 0–0.03)
WBC NRBC COR # BLD: 6.65 10*3/MM3 (ref 3.4–10.8)
WHOLE BLOOD HOLD SPECIMEN: NORMAL
WHOLE BLOOD HOLD SPECIMEN: NORMAL

## 2019-05-18 PROCEDURE — G0378 HOSPITAL OBSERVATION PER HR: HCPCS

## 2019-05-18 PROCEDURE — 99219 PR INITIAL OBSERVATION CARE/DAY 50 MINUTES: CPT | Performed by: INTERNAL MEDICINE

## 2019-05-18 PROCEDURE — 84484 ASSAY OF TROPONIN QUANT: CPT | Performed by: PHYSICIAN ASSISTANT

## 2019-05-18 PROCEDURE — 93005 ELECTROCARDIOGRAM TRACING: CPT | Performed by: EMERGENCY MEDICINE

## 2019-05-18 PROCEDURE — 83880 ASSAY OF NATRIURETIC PEPTIDE: CPT | Performed by: EMERGENCY MEDICINE

## 2019-05-18 PROCEDURE — 85025 COMPLETE CBC W/AUTO DIFF WBC: CPT | Performed by: EMERGENCY MEDICINE

## 2019-05-18 PROCEDURE — 84484 ASSAY OF TROPONIN QUANT: CPT | Performed by: EMERGENCY MEDICINE

## 2019-05-18 PROCEDURE — 83690 ASSAY OF LIPASE: CPT | Performed by: EMERGENCY MEDICINE

## 2019-05-18 PROCEDURE — 99285 EMERGENCY DEPT VISIT HI MDM: CPT

## 2019-05-18 PROCEDURE — 99244 OFF/OP CNSLTJ NEW/EST MOD 40: CPT | Performed by: INTERNAL MEDICINE

## 2019-05-18 PROCEDURE — 80053 COMPREHEN METABOLIC PANEL: CPT | Performed by: EMERGENCY MEDICINE

## 2019-05-18 PROCEDURE — 71045 X-RAY EXAM CHEST 1 VIEW: CPT

## 2019-05-18 RX ORDER — SODIUM CHLORIDE 0.9 % (FLUSH) 0.9 %
3 SYRINGE (ML) INJECTION EVERY 12 HOURS SCHEDULED
Status: DISCONTINUED | OUTPATIENT
Start: 2019-05-18 | End: 2019-05-19 | Stop reason: HOSPADM

## 2019-05-18 RX ORDER — BISOPROLOL FUMARATE 5 MG/1
10 TABLET, FILM COATED ORAL DAILY
Status: DISCONTINUED | OUTPATIENT
Start: 2019-05-18 | End: 2019-05-18

## 2019-05-18 RX ORDER — SODIUM CHLORIDE 0.9 % (FLUSH) 0.9 %
10 SYRINGE (ML) INJECTION AS NEEDED
Status: DISCONTINUED | OUTPATIENT
Start: 2019-05-18 | End: 2019-05-19 | Stop reason: HOSPADM

## 2019-05-18 RX ORDER — ASPIRIN 81 MG/1
324 TABLET, CHEWABLE ORAL ONCE
Status: DISCONTINUED | OUTPATIENT
Start: 2019-05-18 | End: 2019-05-19 | Stop reason: HOSPADM

## 2019-05-18 RX ORDER — SODIUM CHLORIDE 0.9 % (FLUSH) 0.9 %
3-10 SYRINGE (ML) INJECTION AS NEEDED
Status: DISCONTINUED | OUTPATIENT
Start: 2019-05-18 | End: 2019-05-19 | Stop reason: HOSPADM

## 2019-05-18 RX ORDER — AMLODIPINE BESYLATE 2.5 MG/1
2.5 TABLET ORAL 2 TIMES DAILY
Status: DISCONTINUED | OUTPATIENT
Start: 2019-05-18 | End: 2019-05-19 | Stop reason: HOSPADM

## 2019-05-18 RX ADMIN — AMLODIPINE BESYLATE 2.5 MG: 2.5 TABLET ORAL at 20:34

## 2019-05-18 RX ADMIN — AMLODIPINE BESYLATE 2.5 MG: 2.5 TABLET ORAL at 08:52

## 2019-05-18 RX ADMIN — HYDROCHLOROTHIAZIDE: 25 TABLET ORAL at 08:52

## 2019-05-18 RX ADMIN — SODIUM CHLORIDE, PRESERVATIVE FREE 3 ML: 5 INJECTION INTRAVENOUS at 20:35

## 2019-05-18 NOTE — PLAN OF CARE
Problem: Patient Care Overview  Goal: Plan of Care Review  Outcome: Ongoing (interventions implemented as appropriate)   05/18/19 0623   Coping/Psychosocial   Plan of Care Reviewed With patient   Plan of Care Review   Progress no change   OTHER   Outcome Summary Pt arrived from ED. No reports of pain. VSS. Sinus sheron. RA. Ambulating independently.

## 2019-05-18 NOTE — PLAN OF CARE
Problem: Patient Care Overview  Goal: Plan of Care Review  Outcome: Ongoing (interventions implemented as appropriate)   05/18/19 5920   Coping/Psychosocial   Plan of Care Reviewed With patient   Plan of Care Review   Progress no change   OTHER   Outcome Summary Pt has had no c/o chest pain. VSS. Sinus to Sinus sheron. RA. Walked the halls with no pain. Will cont.. to monitor.

## 2019-05-18 NOTE — H&P
[facility] Medicine Services  HISTORY AND PHYSICAL    Patient Name: Nancy Albert  : 1960  MRN: [id number]  Primary Care Physician: Franklin Will MD  Date of admission: 2019      Subjective   Subjective     Chief Complaint: Chest Pain     HPI:  Nancy Albert is a 58 y.o. female with a medical hx significant for HTN presents to [facility] ED c/o daily discomfort of her chest, bilateral shoulders, neck, and abdomen. She reports the timing of these symptoms occur about 1 hour after she takes her beta blocker and all started around January with medication changes. The patient is followed by Dr. Beckford and has had several anti-hypertensive medication changes made over the past several months. Most recently, the patient has tried carvedilol, bystolic, and is currently taking bisoprolol. Dr. Beckford met with the patient on 3/15/19 and discussed pursuing diagnostic coronary angiography and NTG for symptomatic relief, however the patient declinded both at that time. She states her symptoms normally resolve on their own, however today they did not which prompted her to come to the ED. She endorses associated nausea without vomiting and occasional lightheadedness. She reports normal appetite and fluid intake. She denies SOB, palpitations, diaphoresis, radiation to her jaw or to her back. No fever, chills, cough, or night sweats. No reflux, diarrhea, blood in stool. No syncope, weakness, or confusion.  Ms. Albert denies use of tobacco, alcohol, or drugs. She lives at home with her grandson.     While in the ED, her blood pressure has been elevated, labs have been unremarkable, negative troponin x1.  EKG NSR unchanged from previous.  CXR normal.  Patient refused aspirin 324 mg.  The patient will be admitted to the hospital service for further medical management.    Review of Systems   Constitutional: Positive for fatigue. Negative for chills, diaphoresis and fever.   HENT: Negative for  "congestion, postnasal drip, sore throat and trouble swallowing.         \"lump in throat\"   Eyes: Negative for pain and visual disturbance.   Respiratory: Negative for cough, shortness of breath and wheezing.    Cardiovascular: Positive for chest pain. Negative for palpitations and leg swelling.   Gastrointestinal: Positive for abdominal pain and nausea. Negative for abdominal distention, blood in stool, constipation, diarrhea and vomiting.   Endocrine: Negative for cold intolerance and heat intolerance.   Genitourinary: Negative for difficulty urinating and dysuria.   Musculoskeletal: Positive for arthralgias and neck stiffness. Negative for gait problem, joint swelling and myalgias.   Skin: Negative for color change, pallor, rash and wound.   Neurological: Positive for light-headedness. Negative for dizziness, tremors, seizures, syncope, facial asymmetry, speech difficulty, weakness and headaches.   Hematological: Negative for adenopathy. Does not bruise/bleed easily.   Psychiatric/Behavioral: Negative for agitation and confusion.      Otherwise complete ROS reviewed and is negative except as mentioned in the HPI.    Personal History     Past Medical History:   Diagnosis Date   • Arthritis 2019   • Heart murmur    • Hypertension        Past Surgical History:   Procedure Laterality Date   • HYSTERECTOMY  07/15/2008   • OOPHORECTOMY Left 07/15/2008       Family History: family history includes Arthritis in her father; Cancer in her mother; No Known Problems in her brother, daughter, maternal grandfather, paternal grandfather, paternal grandmother, and sister; Pancreatic cancer in her maternal grandmother. Otherwise pertinent FHx was reviewed and unremarkable.     Social History:  reports that she has never smoked. She has never used smokeless tobacco. She reports that she does not drink alcohol or use drugs.  Social History     Social History Narrative    Caffeine: None    Patient lives at her home with her 11 year " old grandson       Medications:    Available home medication information reviewed.    (Not in a hospital admission)    No Known Allergies    Objective   Objective     Vital Signs:   Temp:  [98 °F (36.7 °C)] 98 °F (36.7 °C)  Heart Rate:  [59-65] 61  Resp:  [16] 16  BP: (155-172)/(79) 155/79        Physical Exam   Constitutional: Awake, alert, sitting up in bed   Eyes: PERRLA, sclerae anicteric, no conjunctival injection  HENT: NCAT, mucous membranes dry  Neck: Supple, no thyromegaly, no lymphadenopathy, trachea midline  Respiratory: Clear to auscultation bilaterally, nonlabored respirations   Cardiovascular: RRR, no murmur appreciated, palpable pedal pulses bilaterally, no chest wall tenderness  Gastrointestinal: Positive bowel sounds, soft, diffuse mild tenderness, nondistended  Musculoskeletal: No bilateral ankle edema, no clubbing or cyanosis to extremities  Psychiatric: Appropriate affect, cooperative  Neurologic: Oriented x 3, strength symmetric in all extremities, Cranial Nerves grossly intact to confrontation, speech clear  Skin: No rashes    Results Reviewed:  I have personally reviewed current lab, radiology, and data and agree.    Results from last 7 days   Lab Units 05/18/19  0123   WBC 10*3/mm3 6.65   HEMOGLOBIN g/dL 13.0   HEMATOCRIT % 40.4   PLATELETS 10*3/mm3 231     Results from last 7 days   Lab Units 05/18/19  0123   SODIUM mmol/L 139   POTASSIUM mmol/L 3.6   CHLORIDE mmol/L 100   CO2 mmol/L 27.0   BUN mg/dL 15   CREATININE mg/dL 0.79   GLUCOSE mg/dL 99   CALCIUM mg/dL 9.5   ALT (SGPT) U/L 9   AST (SGOT) U/L 16   TROPONIN T ng/mL <0.010   PROBNP pg/mL 36.7     Estimated Creatinine Clearance: 76.5 mL/min (by C-G formula based on SCr of 0.79 mg/dL).  Brief Urine Lab Results  (Last result in the past 365 days)      Color   Clarity   Blood   Leuk Est   Nitrite   Protein   CREAT   Urine HCG        09/02/18 1256 Yellow Clear Negative Negative Negative Negative             Imaging Results (last 24 hours)      Procedure Component Value Units Date/Time    XR Chest 1 View [[id number]] Collected:  05/18/19 0211     Updated:  05/18/19 0213    Narrative:       CR Chest 1 Vw    SIGNS AND SYMPTOMS:  Chest Pain triage protocol Ongoing chest pain for 3 months and pain usually subsides. Pt states the pain has not subsided since it began earlier yesterday. Hx of hypertension    COMPARISONS:  Chest radiograph 9/2/2018    FINDINGS:    A portable AP view of the chest was obtained  Fully upright.    The lungs are clear. A calcified AP window node and calcified right hilar nodes are again noted, indicative of old granulomatous disease. The cardiac silhouette is normal in size. The descending aorta is tortuous. No pneumothorax or pleural effusion is  identified. The bones are normal for age.      Impression:         1.  Normal chest radiograph.    Signer Name: Sha Smith MD   Signed: 5/18/2019 2:11 AM   Workstation Name: [email or web address] [employer] [facility]           Results for orders placed during the hospital encounter of 10/12/18   Adult Stress Echo W/ Cont or Stress Agent if Necessary Per Protocol    Narrative · Pt reported 2/10 midsternal chest pressure prior to exercise. Decreased   to 1/10 and then disappeared during and after exercise.  · Expected exercise time: 7:05, actual time: 5:34  · Normal ECG stress ECG interpretation.  · Left ventricular systolic function is normal.  · Estimated EF appears to be in the range of 56 - 60%.  · Normal stress echo with no significant echocardiographic evidence for   myocardial ischemia.          Assessment/Plan   Assessment / Plan     Active Hospital Problems    Diagnosis POA   • Atypical chest pain [R07.89] Yes   • Hypertension [I10] Yes     Atypical Chest pain   --Negative troponin x 1, continue to trend  --EKG NSR unchanged from previous   --Normal stress echocardiogram on 9/27/18 with EF 56-60%  --Cardiology consult, followed by Dr. Beckford   --Pt refused aspirin 324mg  --Continue amlodipine, hyzaar, and  bisoprolol for now   --Monitor on telemetry   --NTG as needed     Essential Hypertension  --Currently 155/79  --Continue amlodipine, hyzaar, and bisoprolol for now     DVT prophylaxis:  Mechanical     CODE STATUS:    Code Status and Medical Interventions:   Ordered at: 05/18/19 0352     Level Of Support Discussed With:    Patient     Code Status:    CPR     Medical Interventions (Level of Support Prior to Arrest):    Full     Admission Status:  I believe this patient meets OBSERVATION status, however if further evaluation or treatment plans warrant, status may change.  Based upon current information, I predict patient's care encounter to be less than or equal to 2 midnights.    Electronically signed by Tameka Howell PA-C, 05/18/19, 3:20 AM.    Brief Attending Admission Attestation          Vitals:    05/18/19 0224   BP:  155/80   Pulse: 61   Resp:  16   Temp:  98   SpO2: 100% on room air.       EXAM :  RS- CTA-BL, ,  No wheezing , no crackles, good effort.  CVS- s1s2 regular, no murmur.  ABD- soft, non tender, not distended, no organomegaly.  EXT- no edema.  NEURO- AAO-3, no focal defecit.      Old records reviewed and summarized in PM hx.    HPI:  58-year-old female presented to ED with multiple complaints-including chest pain at rest.  All of her symptoms she thinks are related to her beta-blocker.    In the ED patient got aspirin.    PM HX :  Hypertension-Norvasc 5 mg/12, bisoprolol 10 mg p.o. daily, losartan/HCTZ 100/25 mg p.o. daily.  -Follows up with Dr. Beckford.    Holter monitor secondary to complaint of palpitations 9/18-one episode of nonsustained V. Tach.    Chronic atypical chest pain-Stress echo-10/18-negative study.  Patient did decline cardiac cath in March/2019.    LABS:  Troponin-less than 0.010, proBNP of 36, glucose of 99, LFTs within normal limit.  Chest x-ray- no acute abnormality.  EKG-sinus rhythm 59, QTC of 443, no acute ST-T wave changes.  Echo-10/18-ejection fraction 55%.    A/P:  R/o  ACS-Cards consult.    See above for further detailed assessment and plan developed with APC which I have reviewed and/or edited.     I have discussed with findings, diagnosis and plans with the patient and family.     Nessa Desir MD 05/18/19 3:42 AM

## 2019-05-18 NOTE — ED PROVIDER NOTES
"Subjective   Ms. Nancy Albert is a 58 y.o. female who presents to the ED with complaints of chest pain. She reports that for the past few months she has been having daily nausea, abdominal pain, back pain, and chest pain after taking her beta blocker. However, tonight her symptoms did not resolve on their own as they typically do, which prompted presentation to the ED. She also complains of lightheadedness but she denies any associated sweating, fever, or vomiting. She notes that Dr Beckford, cardiology, has put her on 3 different beta blockers in the past few months. She has seen her primary care provider for these symptoms in the past but there was \"nothing done\". She has a history of HTN, murmur, hysterectomy, and a left oophorectomy. She had a normal stress echo in October 2018 with no signs of ischemia. She has no family history of heart problems. No other acute complaints at this time.         History provided by:  Patient  Chest Pain   Pain quality: burning    Pain severity:  Moderate (7/10)  Timing:  Intermittent  Chronicity:  Recurrent  Associated symptoms: abdominal pain, back pain and nausea    Associated symptoms: no diaphoresis, no fever and no vomiting    Risk factors: hypertension        Review of Systems   Constitutional: Negative for diaphoresis and fever.   Cardiovascular: Positive for chest pain.   Gastrointestinal: Positive for abdominal pain and nausea. Negative for vomiting.   Musculoskeletal: Positive for back pain.   Neurological: Positive for light-headedness.   All other systems reviewed and are negative.      Past Medical History:   Diagnosis Date   • Arthritis 2019   • Heart murmur    • Hypertension        No Known Allergies    Past Surgical History:   Procedure Laterality Date   • HYSTERECTOMY  07/15/2008   • OOPHORECTOMY Left 07/15/2008       Family History   Problem Relation Age of Onset   • Cancer Mother    • Arthritis Father    • No Known Problems Sister    • No Known Problems " Brother    • Pancreatic cancer Maternal Grandmother    • No Known Problems Maternal Grandfather    • No Known Problems Paternal Grandmother    • No Known Problems Paternal Grandfather    • No Known Problems Daughter    • Breast cancer Neg Hx    • Ovarian cancer Neg Hx        Social History     Socioeconomic History   • Marital status: Single     Spouse name: Not on file   • Number of children: Not on file   • Years of education: Not on file   • Highest education level: Not on file   Occupational History   • Occupation: Retired   Tobacco Use   • Smoking status: Never Smoker   • Smokeless tobacco: Never Used   Substance and Sexual Activity   • Alcohol use: No   • Drug use: No   • Sexual activity: Defer   Social History Narrative    Caffeine: None    Patient lives at her home with her 11 year old grandson         Objective   Physical Exam   Constitutional: She is oriented to person, place, and time. She appears well-developed and well-nourished. No distress.   HENT:   Head: Normocephalic and atraumatic.   Nose: Nose normal.   Eyes: Conjunctivae are normal. No scleral icterus.   Neck: Normal range of motion. Neck supple.   Cardiovascular: Normal rate, regular rhythm and normal heart sounds.   No murmur heard.  Pulmonary/Chest: Effort normal and breath sounds normal. No respiratory distress. She exhibits tenderness.   Patient has some tenderness to palpation of her anterior chest wall.   Abdominal: Soft. There is no tenderness.   Musculoskeletal: Normal range of motion.   Neurological: She is alert and oriented to person, place, and time.   Skin: Skin is warm and dry. She is not diaphoretic.   Psychiatric: She has a normal mood and affect. Her behavior is normal.   Nursing note and vitals reviewed.      Procedures         ED Course  ED Course as of May 18 0753   Sat May 18, 2019   0128 The patient pleasantly denies nitroglycerin and any other pain medication at this time. -CP  [AT]   0158 Dr. Leach is at bedside  re-evaluating the patient, updating her on lab/imaging results, and updating her on plan. Discussed admission with the patient, who states she needs some time to consider it.  [AT]   0246 Patient pleasantly declined CT scan. She understands that we are unable to rule out aortic aneurysm at this time without a CT. -CP  [AT]      ED Course User Index  [AT] Brenda Juan     Recent Results (from the past 24 hour(s))   Troponin    Collection Time: 05/18/19  1:23 AM   Result Value Ref Range    Troponin T <0.010 0.000 - 0.030 ng/mL   Comprehensive Metabolic Panel    Collection Time: 05/18/19  1:23 AM   Result Value Ref Range    Glucose 99 65 - 99 mg/dL    BUN 15 6 - 20 mg/dL    Creatinine 0.79 0.57 - 1.00 mg/dL    Sodium 139 136 - 145 mmol/L    Potassium 3.6 3.5 - 5.2 mmol/L    Chloride 100 98 - 107 mmol/L    CO2 27.0 22.0 - 29.0 mmol/L    Calcium 9.5 8.6 - 10.5 mg/dL    Total Protein 7.7 6.0 - 8.5 g/dL    Albumin 4.40 3.50 - 5.20 g/dL    ALT (SGPT) 9 1 - 33 U/L    AST (SGOT) 16 1 - 32 U/L    Alkaline Phosphatase 69 39 - 117 U/L    Total Bilirubin 0.3 0.2 - 1.2 mg/dL    eGFR  African Amer 90 >60 mL/min/1.73    Globulin 3.3 gm/dL    A/G Ratio 1.3 g/dL    BUN/Creatinine Ratio 19.0 7.0 - 25.0    Anion Gap 12.0 mmol/L   Lipase    Collection Time: 05/18/19  1:23 AM   Result Value Ref Range    Lipase 53 13 - 60 U/L   BNP    Collection Time: 05/18/19  1:23 AM   Result Value Ref Range    proBNP 36.7 5.0 - 900.0 pg/mL   Light Blue Top    Collection Time: 05/18/19  1:23 AM   Result Value Ref Range    Extra Tube hold for add-on    Green Top (Gel)    Collection Time: 05/18/19  1:23 AM   Result Value Ref Range    Extra Tube Hold for add-ons.    Lavender Top    Collection Time: 05/18/19  1:23 AM   Result Value Ref Range    Extra Tube hold for add-on    Gold Top - SST    Collection Time: 05/18/19  1:23 AM   Result Value Ref Range    Extra Tube Hold for add-ons.    CBC Auto Differential    Collection Time: 05/18/19  1:23 AM   Result  "Value Ref Range    WBC 6.65 3.40 - 10.80 10*3/mm3    RBC 4.65 3.77 - 5.28 10*6/mm3    Hemoglobin 13.0 12.0 - 15.9 g/dL    Hematocrit 40.4 34.0 - 46.6 %    MCV 86.9 79.0 - 97.0 fL    MCH 28.0 26.6 - 33.0 pg    MCHC 32.2 31.5 - 35.7 g/dL    RDW 12.7 12.3 - 15.4 %    RDW-SD 40.8 37.0 - 54.0 fl    MPV 10.4 6.0 - 12.0 fL    Platelets 231 140 - 450 10*3/mm3    Neutrophil % 47.6 42.7 - 76.0 %    Lymphocyte % 44.1 19.6 - 45.3 %    Monocyte % 5.6 5.0 - 12.0 %    Eosinophil % 2.4 0.3 - 6.2 %    Basophil % 0.3 0.0 - 1.5 %    Immature Grans % 0.2 0.0 - 0.5 %    Neutrophils, Absolute 3.17 1.70 - 7.00 10*3/mm3    Lymphocytes, Absolute 2.93 0.70 - 3.10 10*3/mm3    Monocytes, Absolute 0.37 0.10 - 0.90 10*3/mm3    Eosinophils, Absolute 0.16 0.00 - 0.40 10*3/mm3    Basophils, Absolute 0.02 0.00 - 0.20 10*3/mm3    Immature Grans, Absolute 0.01 0.00 - 0.05 10*3/mm3   Troponin    Collection Time: 05/18/19  1:23 AM   Result Value Ref Range    Troponin T <0.010 0.000 - 0.030 ng/mL   Troponin    Collection Time: 05/18/19  3:42 AM   Result Value Ref Range    Troponin T <0.010 0.000 - 0.030 ng/mL     Note: In addition to lab results from this visit, the labs listed above may include labs taken at another facility or during a different encounter within the last 24 hours. Please correlate lab times with ED admission and discharge times for further clarification of the services performed during this visit.    XR Chest 1 View   Final Result      1.  Normal chest radiograph.      Signer Name: Sha Smith MD    Signed: 5/18/2019 2:11 AM    Workstation Name: [id number]            Vitals:    05/18/19 0223 05/18/19 0224 05/18/19 0508 05/18/19 0513   BP:   127/80 148/86   BP Location:   Right arm Left arm   Patient Position:   Sitting Sitting   Pulse: 59 61 57 58   Resp:   16    Temp:   97.8 °F (36.6 °C)    TempSrc:   Oral    SpO2: 100% 100%     Weight:   72.6 kg (160 lb)    Height:   165.1 cm (65\")        ECG/EMG Results (last 24 hours)     " Procedure Component Value Units Date/Time    ECG 12 Lead [[id number]] Collected:  05/18/19 0116     Updated:  05/18/19 0116        ECG 12 Lead         ECG 12 Lead                             MDM    Final diagnoses:   Chest pain, unspecified type   Hypertension, unspecified type       Documentation assistance provided by khalif Juan.  Information recorded by the scribe was done at my direction and has been verified and validated by me.     Brenda Juan  05/18/19 0138       Brenda Juan  05/18/19 0159       Dk Leach DO  05/18/19 0759

## 2019-05-18 NOTE — CONSULTS
[facility]  Cardiovascular Consultation Note    Reason for consultation: Chest pain    History of Present Illness:  58-year-old retired female with a history of hypertension, no diabetes, no smoking has had problems with her blood pressure and has had multiple medication change.  She is also had problems with chest pain.  She has been following Dr. Beckford with this.  Last fall she had a stress echo which showed no ischemic abnormalities.  She called the office on 5 9 complaining of chest pain and Dr. Beckford offered her nitrates as well as a cardiac cath.  The patient came in because of chest pain.  Patient states that she was initially evaluated for hypertension.  She states that prior to October she had no problems with shortness of breath or chest pain.  Since that time she has been on 3 different beta-blockers.  She notices a definite correlation that every time she takes a beta-blocker about 1 hour later she started to get discomfort in her chest moves up into her shoulders to her back up to her neck.  Usually would last for about an hour.  She also notes that when she takes she gets a lump in her throat and it makes her slightly short of breath.  She states prior to be on beta-blocker she can mow grass without any difficulties.  She did have a prior sleep study and she was told she did not really have sleep apnea.  However she is complaining of waking up tired snoring loud getting sleepy watching TV.  She denies any significant weight gain however.  She does not specifically get any exertional chest pain.  She came in because these symptoms after her beta-blocker intake persisted for hours and hours.  This pain persisted for hours with a negative troponin and no EKG changes    Cardiac risk factors: #1 increasing age #2 hypertension    Past medical and surgical history, social and family history reviewed in EMR.    REVIEW OF SYSTEMS:     Past Medical History:   Diagnosis  Date   • Arthritis 2019   • Heart murmur    • Hypertension      Past Surgical History:   Procedure Laterality Date   • HYSTERECTOMY  07/15/2008   • OOPHORECTOMY Left 07/15/2008     Social History     Socioeconomic History   • Marital status: Single     Spouse name: Not on file   • Number of children: Not on file   • Years of education: Not on file   • Highest education level: Not on file   Occupational History   • Occupation: Retired   Tobacco Use   • Smoking status: Never Smoker   • Smokeless tobacco: Never Used   Substance and Sexual Activity   • Alcohol use: No   • Drug use: No   • Sexual activity: Defer   Social History Narrative    Caffeine: None    Patient lives at her home with her 11 year old grandson     Family History   Problem Relation Age of Onset   • Cancer Mother    • Arthritis Father    • No Known Problems Sister    • No Known Problems Brother    • Pancreatic cancer Maternal Grandmother    • No Known Problems Maternal Grandfather    • No Known Problems Paternal Grandmother    • No Known Problems Paternal Grandfather    • No Known Problems Daughter    • Breast cancer Neg Hx    • Ovarian cancer Neg Hx        H&P ROS reviewed and pertinent CV ROS as noted in HPI.    Cardiac: Patient complains of pain in her chest up into her shoulders and her back whenever she takes a beta-blocker.  She is having no palpitations no syncope  Respiratory: Positive dyspnea when she takes her beta-blocker no obvious wheezing she is a non-smoker no hemoptysis  Lower Extremities: No issues with her feet  GI: No nausea vomiting diarrhea bright blood per rectum or melena  Neuro: No history of stroke TIA or neurologic event  Hematology: No ecchymosis or petechiae no hematologic malignancies  Endocrine: No diabetes or obvious thyroid issues      Physical Exam: General very pleasant female in bed at a 30 degree angle normal heart rate of 59 normal blood pressure no dyspnea or tachypnea       HEENT: No JVP or bruits, dentition is  good, tongue is midline, no icterus, she wears reading glasses       Respiratory: Equal better symmetrical expansion and clear to auscultation bilaterally       Cardiovascular: Regular rate and rhythm with a grade 2 over systolic ejection murmur no peripheral edema, she has 2+ DP pulses bilaterally       GI: Positive bowel sounds no organomegaly       Lower Extremities: No peripheral lesions       Neuro: Hand  strength is equal bilaterally 5/5, cranial nerves II through XII appear grossly normal, she moves all 4 extremities up to space in exam       Skin: Warm and dry with no edema palpation       Psych: Pleasant affect and oriented 3    Results Review: Normal sinus rhythm no ischemia.  I reviewed Dr. Beckford's office note from 2 months ago and said that the patient was doing better she is having no chest pain her breathing was improved and no palpitations.           Vital Sign Min/Max for last 24 hours  Temp  Min: 97.5 °F (36.4 °C)  Max: 98.3 °F (36.8 °C)   BP  Min: 124/71  Max: 172/79   Pulse  Min: 51  Max: 65   Resp  Min: 16  Max: 16   SpO2  Min: 98 %  Max: 100 %   No Data Recorded      Intake/Output Summary (Last 24 hours) at 5/18/2019 1405  Last data filed at 5/18/2019 1300  Gross per 24 hour   Intake 0 ml   Output 1000 ml   Net -1000 ml             Current Facility-Administered Medications:   •  amLODIPine (NORVASC) tablet 2.5 mg, 2.5 mg, Oral, BID, Tameka Howell PA-LEONID, 2.5 mg at 05/18/19 0852  •  aspirin chewable tablet 324 mg, 324 mg, Oral, Once, Dk Leach, DO  •  bisoprolol (ZEBeta) tablet 10 mg, 10 mg, Oral, Daily, Tameka Howell PA-C  •  iopamidol (ISOVUE-370) 76 % injection 100 mL, 100 mL, Intravenous, Once in imaging, Dk Leach, DO  •  losartan (COZAAR) 100 mg, hydrochlorothiazide (HYDRODIURIL) 25 mg for HYZAAR 100-25, , Oral, Daily, Tameka Howell PA-C  •  sodium chloride 0.9 % flush 10 mL, 10 mL, Intravenous, PRN, Dk Leach, DO  •  sodium chloride 0.9 % flush 3 mL, 3 mL, Intravenous,  Q12H, Tameka Howell PA-C  •  sodium chloride 0.9 % flush 3-10 mL, 3-10 mL, Intravenous, PRN, Tameka Howell PA-C    Lab Review:   Results from last 7 days   Lab Units 05/18/19  0123   WBC 10*3/mm3 6.65   HEMOGLOBIN g/dL 13.0   PLATELETS 10*3/mm3 231     Results from last 7 days   Lab Units 05/18/19  0123   SODIUM mmol/L 139   POTASSIUM mmol/L 3.6   CO2 mmol/L 27.0   BUN mg/dL 15   CREATININE mg/dL 0.79   GLUCOSE mg/dL 99     Estimated Creatinine Clearance: 76.5 mL/min (by C-G formula based on SCr of 0.79 mg/dL).        .lipd  Lab Results   Component Value Date    AST 16 05/18/2019    ALT 9 05/18/2019       Radiology Reports:  Imaging Results (last 72 hours)     Procedure Component Value Units Date/Time    XR Chest 1 View [[id number]] Collected:  05/18/19 0211     Updated:  05/18/19 0213    Narrative:       CR Chest 1 Vw    SIGNS AND SYMPTOMS:  Chest Pain triage protocol Ongoing chest pain for 3 months and pain usually subsides. Pt states the pain has not subsided since it began earlier yesterday. Hx of hypertension    COMPARISONS:  Chest radiograph 9/2/2018    FINDINGS:    A portable AP view of the chest was obtained  Fully upright.    The lungs are clear. A calcified AP window node and calcified right hilar nodes are again noted, indicative of old granulomatous disease. The cardiac silhouette is normal in size. The descending aorta is tortuous. No pneumothorax or pleural effusion is  identified. The bones are normal for age.      Impression:         1.  Normal chest radiograph.    Signer Name: Sha Smith MD   Signed: 5/18/2019 2:11 AM   Workstation Name: [id number]             Assessment/Plan: 1 this patient has been following with Dr. Beckford for management of her hypertension.  The patient has been receiving beta-blocker trials since October.  She is an excellent historian and she reports that every time she takes a dose of beta-blocker she gets chest tightness goes up into her shoulders into her back  and down into her abdomen.  When she is not taking beta-blockers this does not happen.  She is also states that she gets short of breath when she takes the beta-blockers.  At this point time the simple thing is to stop beta-blockers.  If her heart rate would go up or she had palpitations we could consider using diltiazem in the place of any beta-blocker and get rid of her amlodipine.  I will discontinue her bisoprolol.  I will order to ambulate up and down the hallways as well to look for any arrhythmias or exertional chest pain.  If the patient does well overnight and her heart rates fine and blood pressure fine she can probably be discharged home tomorrow.  In list something changes I do not think she needs to have a cardiac cath during this admission.  Her chest pain syndrome yesterday lasted for hours with negative cardiac markers and EKG      Mario العلي MD  05/18/19  2:05 PM

## 2019-05-19 VITALS
BODY MASS INDEX: 26.66 KG/M2 | SYSTOLIC BLOOD PRESSURE: 107 MMHG | HEART RATE: 65 BPM | WEIGHT: 160 LBS | DIASTOLIC BLOOD PRESSURE: 65 MMHG | HEIGHT: 65 IN | TEMPERATURE: 98.2 F | OXYGEN SATURATION: 100 % | RESPIRATION RATE: 16 BRPM

## 2019-05-19 PROBLEM — R07.9 CHEST PAIN: Status: ACTIVE | Noted: 2019-05-19

## 2019-05-19 PROBLEM — R07.89 ATYPICAL CHEST PAIN: Status: RESOLVED | Noted: 2019-03-15 | Resolved: 2019-05-19

## 2019-05-19 PROBLEM — R07.9 CHEST PAIN: Status: RESOLVED | Noted: 2019-05-19 | Resolved: 2019-05-19

## 2019-05-19 PROCEDURE — 99217 PR OBSERVATION CARE DISCHARGE MANAGEMENT: CPT | Performed by: NURSE PRACTITIONER

## 2019-05-19 PROCEDURE — G0378 HOSPITAL OBSERVATION PER HR: HCPCS

## 2019-05-19 RX ORDER — AMLODIPINE BESYLATE 5 MG/1
2.5 TABLET ORAL 2 TIMES DAILY
Qty: 180 TABLET | Refills: 3
Start: 2019-05-19 | End: 2019-12-30

## 2019-05-19 RX ADMIN — AMLODIPINE BESYLATE 2.5 MG: 2.5 TABLET ORAL at 08:44

## 2019-05-19 RX ADMIN — SODIUM CHLORIDE, PRESERVATIVE FREE 3 ML: 5 INJECTION INTRAVENOUS at 08:44

## 2019-05-19 RX ADMIN — HYDROCHLOROTHIAZIDE: 25 TABLET ORAL at 08:44

## 2019-05-19 NOTE — PLAN OF CARE
Problem: Patient Care Overview  Goal: Plan of Care Review  Outcome: Ongoing (interventions implemented as appropriate)   05/19/19 0633   Coping/Psychosocial   Plan of Care Reviewed With patient   Plan of Care Review   Progress improving   OTHER   Outcome Summary Pt rested well this shift. VSS. Sinus sheron. RA. No reports of chest pain. Ambulating in room independently.

## 2019-05-19 NOTE — DISCHARGE SUMMARY
[facility]  DISCHARGE SUMMARY    Patient Name: Nancy Albert  : 1960  MRN: [id number]    Date of Admission: 2019  Date of Discharge: 2019  Primary Care Physician: Franklin Will MD    Consults     Date and Time Order Name Status Description    2019 0517 Inpatient Cardiology Consult Completed           Hospital Course     Presenting Problem:   Chest pain, unspecified type [R07.9]    Active Hospital Problems    Diagnosis  POA   • Hypertension [I10]  Yes      Resolved Hospital Problems    Diagnosis Date Resolved POA   • **Atypical chest pain [R07.89] 2019 Yes   • Chest pain [R07.9] 2019 Yes          Hospital Course:  Nancy Albert is a 58 y.o. female with history of hypertension who has had multiple blood pressure medication changes over the past year.  Patient has been on several trials of beta-blocker per Dr. Beckford, who is managing blood pressure.  She presents on 2019 with ongoing chest pain that persisted for several hours, not worsened with exertion/activity.  No significant shortness of breath, palpitations or dizziness.  Patient states she is noticed each time she takes her beta-blocker she has chest pain that radiates to her neck and shoulder as well as feeling unwell for nearly a day after taking.    Cardiac work-up completed with negative troponin.  Heart catheterization was offered however patient declined this admission.  Patient has been taken off her beta-blocker and amlodipine adjusted.  She has tolerated losartan/HCTZ and amlodipine with normal sinus rhythm and is normotensive.  Currently patient feeling at baseline and to be discharged home with a low follow-up.    Discharge Follow Up Recommendations for labs/diagnostics:  Discussed with patient to have follow-up for blood pressure check and further management of antihypertensives within 1 to 2 weeks.  Patient prefers to follow-up with Dr. Beckford for further  blood pressure management.  Patient to call Monday morning to set up appointment.  Follow-up primary care 1 to 2 weeks    Day of Discharge     HPI:   Patient ambulating in room on room air.  Feeling well.  No further chest pain, chest pressure, palpitations or shortness of breath.  Patient states she is tolerating diet and voiding well.    Review of Systems  Gen- No fevers, chills  CV- No chest pain, palpitations  Resp- No cough, dyspnea  GI- No N/V/D, abd pain      Otherwise ROS is negative except as mentioned in the HPI.    Vital Signs:   Temp:  [98 °F (36.7 °C)-98.8 °F (37.1 °C)] 98.2 °F (36.8 °C)  Heart Rate:  [50-72] 65  Resp:  [16] 16  BP: ()/(63-77) 107/65     Physical Exam:  Constitutional: No acute distress, awake, alert  HENT: NCAT, mucous membranes moist  Respiratory: Clear to auscultation bilaterally, respiratory effort normal   Cardiovascular: RRR, no murmurs, rubs, or gallops, palpable pedal pulses bilaterally  Gastrointestinal: Positive bowel sounds, soft, nontender, nondistended  Musculoskeletal: No bilateral ankle edema  Psychiatric: Appropriate affect, cooperative  Neurologic: Oriented x 3, strength symmetric in all extremities, Cranial Nerves grossly intact to confrontation, speech clear  Skin: No rashes      Pertinent  and/or Most Recent Results     Results from last 7 days   Lab Units 05/18/19  0123   WBC 10*3/mm3 6.65   HEMOGLOBIN g/dL 13.0   HEMATOCRIT % 40.4   PLATELETS 10*3/mm3 231   SODIUM mmol/L 139   POTASSIUM mmol/L 3.6   CHLORIDE mmol/L 100   CO2 mmol/L 27.0   BUN mg/dL 15   CREATININE mg/dL 0.79   GLUCOSE mg/dL 99   CALCIUM mg/dL 9.5     Results from last 7 days   Lab Units 05/18/19  0123   BILIRUBIN mg/dL 0.3   ALK PHOS U/L 69   ALT (SGPT) U/L 9   AST (SGOT) U/L 16           Invalid input(s): TG, LDLCALC, LDLREALC  Results from last 7 days   Lab Units 05/18/19  0342 05/18/19  0123   PROBNP pg/mL  --  36.7   TROPONIN T ng/mL <0.010 <0.010  <0.010       Brief Urine Lab Results   (Last result in the past 365 days)      Color   Clarity   Blood   Leuk Est   Nitrite   Protein   CREAT   Urine HCG        09/02/18 1256 Yellow Clear Negative Negative Negative Negative               Microbiology Results Abnormal     None          Imaging Results (all)     Procedure Component Value Units Date/Time    XR Chest 1 View [[id number]] Collected:  05/18/19 0211     Updated:  05/18/19 0213    Narrative:       CR Chest 1 Vw    SIGNS AND SYMPTOMS:  Chest Pain triage protocol Ongoing chest pain for 3 months and pain usually subsides. Pt states the pain has not subsided since it began earlier yesterday. Hx of hypertension    COMPARISONS:  Chest radiograph 9/2/2018    FINDINGS:    A portable AP view of the chest was obtained  Fully upright.    The lungs are clear. A calcified AP window node and calcified right hilar nodes are again noted, indicative of old granulomatous disease. The cardiac silhouette is normal in size. The descending aorta is tortuous. No pneumothorax or pleural effusion is  identified. The bones are normal for age.      Impression:         1.  Normal chest radiograph.    Signer Name: Sha Smith MD   Signed: 5/18/2019 2:11 AM   Workstation Name: [facility]_[employer]                       Results for orders placed during the hospital encounter of 10/12/18   Adult Stress Echo W/ Cont or Stress Agent if Necessary Per Protocol    Narrative · Pt reported 2/10 midsternal chest pressure prior to exercise. Decreased   to 1/10 and then disappeared during and after exercise.  · Expected exercise time: 7:05, actual time: 5:34  · Normal ECG stress ECG interpretation.  · Left ventricular systolic function is normal.  · Estimated EF appears to be in the range of 56 - 60%.  · Normal stress echo with no significant echocardiographic evidence for   myocardial ischemia.            Discharge Details        Discharge Medications      Changes to Medications      Instructions Start Date   amLODIPine 5 MG  tablet  Commonly known as:  NORVASC  What changed:  how much to take   2.5 mg, Oral, 2 Times Daily         Continue These Medications      Instructions Start Date   losartan-hydrochlorothiazide 100-25 MG per tablet  Commonly known as:  HYZAAR   1 tablet, Oral, Daily         Stop These Medications    bisoprolol 10 MG tablet  Commonly known as:  ZEBeta            No Known Allergies      Discharge Disposition:  Home or Self Care    Discharge Diet:  Diet Order   Procedures   • Diet Regular; Cardiac         Discharge Activity:   Activity Instructions     Activity as Tolerated              CODE STATUS:    Code Status and Medical Interventions:   Ordered at: 05/18/19 0357     Level Of Support Discussed With:    Patient     Code Status:    CPR     Medical Interventions (Level of Support Prior to Arrest):    Full         Future Appointments   Date Time Provider Department Center   9/27/2019 11:45 AM Paramjit Mitchell MD [facility] None       Additional Instructions for the Follow-ups that You Need to Schedule     Discharge Follow-up with PCP   As directed       Currently Documented PCP:    Franklin Will MD    PCP Phone Number:    [phone]     Follow Up Details:  1-2 weeks         Discharge Follow-up with Specified Provider: dr. mitchell/ [facility] within the next few weeks- BP check- patient to call in the morning   As directed      To:  dr. mitchell/ [facility] within the next few weeks- BP check- patient to call in the morning               Time Spent on Discharge:  38 minutes    Electronically signed by ANA Iglesias, 05/19/19, 11:07 AM.

## 2019-05-23 ENCOUNTER — TRANSCRIBE ORDERS (OUTPATIENT)
Dept: ADMINISTRATIVE | Facility: HOSPITAL | Age: 59
End: 2019-05-23

## 2019-05-23 DIAGNOSIS — R10.9 ABDOMINAL PAIN, UNSPECIFIED ABDOMINAL LOCATION: Primary | ICD-10-CM

## 2019-05-25 NOTE — PROGRESS NOTES
Brief note:     Patient admitted earlier today for chest pain associated with B-blocker use. She is currently chest pain free.  Cardiac enzymes not elevated, no ST changes on EKG.    - Heart Catheterization has been offered to the patient in the past and she has declined    - Cardiology following while inpatient.   standing/walking

## 2019-06-03 ENCOUNTER — HOSPITAL ENCOUNTER (OUTPATIENT)
Dept: CT IMAGING | Facility: HOSPITAL | Age: 59
Discharge: HOME | End: 2019-06-03
Admitting: FAMILY MEDICINE

## 2019-06-03 DIAGNOSIS — R10.9 ABDOMINAL PAIN, UNSPECIFIED ABDOMINAL LOCATION: ICD-10-CM

## 2019-06-03 PROCEDURE — 74176 CT ABD & PELVIS W/O CONTRAST: CPT

## 2019-07-16 ENCOUNTER — TELEPHONE (OUTPATIENT)
Dept: CARDIOLOGY | Facility: CLINIC | Age: 59
End: 2019-07-16

## 2019-07-16 NOTE — TELEPHONE ENCOUNTER
"Pt called and stated that she hasn't felt well since starting a new medication,esomeprazole 40mg, her PCP prescribed. Pt was started on esomeprazole for acid reflux after feeling like there \"lump in her throat\".      Pt has appt with her PCP today and advised pt to consult with PCP at appt today regarding this medication and any potential side effects.      "

## 2019-08-12 ENCOUNTER — LAB REQUISITION (OUTPATIENT)
Dept: LAB | Facility: HOSPITAL | Age: 59
End: 2019-08-12

## 2019-08-12 ENCOUNTER — OUTSIDE FACILITY SERVICE (OUTPATIENT)
Dept: GASTROENTEROLOGY | Facility: CLINIC | Age: 59
End: 2019-08-12

## 2019-08-12 DIAGNOSIS — R10.10 UPPER ABDOMINAL PAIN: ICD-10-CM

## 2019-08-12 DIAGNOSIS — R10.84 GENERALIZED ABDOMINAL PAIN: ICD-10-CM

## 2019-08-12 DIAGNOSIS — K21.9 GASTRO-ESOPHAGEAL REFLUX DISEASE WITHOUT ESOPHAGITIS: ICD-10-CM

## 2019-08-12 PROCEDURE — 88305 TISSUE EXAM BY PATHOLOGIST: CPT | Performed by: INTERNAL MEDICINE

## 2019-08-12 PROCEDURE — 43239 EGD BIOPSY SINGLE/MULTIPLE: CPT | Performed by: INTERNAL MEDICINE

## 2019-08-13 LAB
CYTO UR: NORMAL
LAB AP CASE REPORT: NORMAL
LAB AP CLINICAL INFORMATION: NORMAL
PATH REPORT.FINAL DX SPEC: NORMAL
PATH REPORT.GROSS SPEC: NORMAL

## 2019-08-26 ENCOUNTER — TELEPHONE (OUTPATIENT)
Dept: GASTROENTEROLOGY | Facility: CLINIC | Age: 59
End: 2019-08-26

## 2019-08-26 NOTE — TELEPHONE ENCOUNTER
PT CALLED LVM REGARDING QUESTIONS OF EGD RESULTS; RETURNED PT CALL; PT QUESTIONS WERE ANSWERED TO SATISFACTORY.

## 2019-09-13 ENCOUNTER — TELEPHONE (OUTPATIENT)
Dept: GASTROENTEROLOGY | Facility: CLINIC | Age: 59
End: 2019-09-13

## 2019-09-13 NOTE — TELEPHONE ENCOUNTER
Pt called regarding omeprazole and esomeprazole.. Insurance is covering either medication. Pt states she has purchased Esomeprazole OTC 20 mg 24 hour but was not sure when to take; advised pt to take one 30 minutes before breakfast. Advised pt if this is not working to please inform us and we can consult with Dr. Mukherjee for alternatives. Pt v/u

## 2019-09-27 ENCOUNTER — OFFICE VISIT (OUTPATIENT)
Dept: CARDIOLOGY | Facility: CLINIC | Age: 59
End: 2019-09-27

## 2019-09-27 VITALS
HEIGHT: 65 IN | WEIGHT: 151 LBS | BODY MASS INDEX: 25.16 KG/M2 | DIASTOLIC BLOOD PRESSURE: 68 MMHG | SYSTOLIC BLOOD PRESSURE: 133 MMHG | HEART RATE: 69 BPM

## 2019-09-27 DIAGNOSIS — I10 ESSENTIAL HYPERTENSION: ICD-10-CM

## 2019-09-27 DIAGNOSIS — R01.1 HEART MURMUR: ICD-10-CM

## 2019-09-27 DIAGNOSIS — R00.2 PALPITATIONS: Primary | ICD-10-CM

## 2019-09-27 DIAGNOSIS — I11.9 HYPERTENSIVE HEART DISEASE WITHOUT HEART FAILURE: ICD-10-CM

## 2019-09-27 PROCEDURE — 99214 OFFICE O/P EST MOD 30 MIN: CPT | Performed by: INTERNAL MEDICINE

## 2019-09-27 RX ORDER — ESOMEPRAZOLE MAGNESIUM 40 MG/1
20 CAPSULE, DELAYED RELEASE ORAL DAILY
COMMUNITY
Start: 2019-08-19 | End: 2020-01-31 | Stop reason: SINTOL

## 2019-09-27 NOTE — PROGRESS NOTES
Subjective:     Encounter Date:09/27/2019    Patient ID: Nancy Albert is a 58 y.o. single -American female from [place], [place], recently retired from the [employer].     PHYSICIAN: Franklin Will MD  SLEEP PHYSICIAN: [facility]  REFERRING HEALTHCARE PROVIDER: ANA Theodore  GI PHYSICIAN:  Nicola Mukherjee MD    Chief Complaint:   Chief Complaint   Patient presents with   • Hypertension   • Palpitations     Problem List:  1. Hypertensive cardiovascular disease:  a. Graded exercise test, 2/11/2016: The maximal exercise stress test negative by ST criteria.  No precordial  symptoms developed.  Normal systolic blood pressure response.  Normal exercise tolerance.  No ventricular ectopy recorded.  Clinical correlation required.  b. Remote 24-hour Holter monitor approximately 2016 with her physician: Normal-data deficit   c. Stress echocardiogram, 10/12/2018: Normal stress echo with no significant echocardiographic evidence for myocardial ischemia, EF 0.56-0.60  d. Holter monitor, 9/6/2018: Abnormal monitor study with one episode of nonsustained V. tach, 6 beats in duration.  Occasional PVCs.  Rare, brief episodes of PAT.  Patient triggered event related was sinus rhythm  e. 24-hour ambulatory blood pressure monitor November 2018: Average blood pressure 100-120/60-70 torr, heart rate 60-70 bpm, maximum blood pressure 160/85  f. Residual class I symptoms with intermittent random atypical chest pain, March 2019  2. Hypertension  3. Apparent asymptomatic nonsustained ventricular tachycardia during hypokalemia, incidentally found on Holter monitor, September 2018  4. Palpitations with Holter Monitor (9/6/2018): Abnormal monitor study with one episode of nonsustained V. tach, 6 beats in duration.  Occasional PVCs.  Rare, brief episodes of PAT.   5. Cardiac murmur with acceptable echocardiogram, October 2018  6. At risk for sleep apnea with negative  sleep study in 2017-data deficit  7. Surgical history: KATI, left oophorectomy    No Known Allergies      Current Outpatient Medications:   •  amLODIPine (NORVASC) 5 MG tablet, Take 0.5 tablets by mouth 2 (Two) Times a Day., Disp: 180 tablet, Rfl: 3  •  esomeprazole (nexIUM) 40 MG capsule, 20 mg Daily., Disp: , Rfl:   •  losartan-hydrochlorothiazide (HYZAAR) 100-25 MG per tablet, Take 1 tablet by mouth Daily., Disp: , Rfl:     HISTORY OF PRESENT ILLNESS: Patient returns for scheduled 6-month followup.  She had a 2-day [facility] admission in mid-May 2019 for chest pain.  She was eventually diagnosed with gastric reflux, gastritis, and esophagitis; she is surprised that she had never had acid reflux prior to this episode.  She says it constantly feels like she has a lump in her throat.  She does not have hoarseness or trouble swallowing.  She has lost 11 pounds since her last appointment, and she does not know why.  She continues to follow with Dr. Will but does not have to follow up with the GI doctor.  The only exercise she does is line-dancing once a week.  She is encouraged to walk some for exercise.  She has no chest pain, tightness, or pressure with her activities.  She is in the habit of getting a flu shot in the fall.  She had a vacation to [place] in mid-June 2019, but it rained 3 of the 4 days they were there.  She continues to enjoy [employer].  Patient otherwise denies chest pain, shortness of breath, PND, edema, palpitations, syncope or presyncope at this time.        Review of Systems   Constitution: Positive for decreased appetite and weight loss.   Musculoskeletal: Positive for back pain.   Gastrointestinal: Positive for bloating and abdominal pain.      All other systems reviewed and otherwise negative.    Procedures       Objective:       Vitals:    09/27/19 1139 09/27/19 1143   BP: 136/74 133/68   BP Location: Left arm Left arm   Patient Position: Sitting Standing   Pulse: 74 69   Weight: 68.5 kg (151  "lb)    Height: 165.1 cm (65\")      Body mass index is 25.13 kg/m².   Last weight:  162 lbs.    Physical Exam   Constitutional: She is oriented to person, place, and time. She appears well-developed and well-nourished.   Neck: No JVD present. Carotid bruit is not present. No thyromegaly present.   Cardiovascular: Regular rhythm, S1 normal and S2 normal. Exam reveals no gallop, no S3 and no friction rub.   Murmur heard.   Medium-pitched early systolic murmur is present with a grade of 1/6 at the lower left sternal border.  Pulses:       Dorsalis pedis pulses are 2+ on the right side, and 2+ on the left side.        Posterior tibial pulses are 2+ on the right side, and 2+ on the left side.   Pulmonary/Chest: Effort normal. She has decreased breath sounds. She has no wheezes. She has no rhonchi. She has no rales.   Abdominal: Soft. She exhibits no mass. There is no hepatosplenomegaly. There is no tenderness. There is no guarding.   Bowel sounds audible x4   Musculoskeletal: Normal range of motion. She exhibits no edema.   Lymphadenopathy:     She has no cervical adenopathy.   Neurological: She is alert and oriented to person, place, and time.   Skin: Skin is warm, dry and intact. No rash noted.   Vitals reviewed.        Lab Review:   05/18/2019:  · BNP - 36.7  · Lipase - 53  · Sodium - 139  · Potassium - 3.6  · Chloride - 100  · Troponin - <0.010    Lab Results   Component Value Date    GLUCOSE 99 05/18/2019    BUN 15 05/18/2019    CREATININE 0.79 05/18/2019    EGFRIFAFRI 90 05/18/2019    BCR 19.0 05/18/2019    CO2 27.0 05/18/2019    CALCIUM 9.5 05/18/2019    ALBUMIN 4.40 05/18/2019    AST 16 05/18/2019    ALT 9 05/18/2019       Lab Results   Component Value Date    WBC 6.65 05/18/2019    HGB 13.0 05/18/2019    HCT 40.4 05/18/2019    MCV 86.9 05/18/2019     05/18/2019     Abdominal ultrasound, 05/13/2019:  FINDINGS: The pancreas is normal. A simple cyst measuring 1.7 cm in its greatest dimension. There is a " second hypoechoic area in the right lobe measuring 1.1 x 1.3 cm. The gallbladder demonstrates no wall thickening or stones. Common bile duct is normal measuring 4.2 mm. The right kidney measures 9.7 cm with a small simple cyst measuring 7 mm otherwise no mass, stone or obstruction.     IMPRESSION:  1. The gallbladder is normal.  There is no evidence of cholelithiasis. There is no edema of the gallbladder and the biliary ductal system is normal.  2. There are 2 small hypoechoic areas in the liver measuring 1.3 and 1.7 cm in diameter respectively thought to represent incidental hepatic cysts.    FL upper GI single with KUB, 05/13/2019:  IMPRESSION: Multiple filling defects seen within the stomach may represent undigested food. Further evaluation such as a gastric emptying study may be considered if clinically relevant.    Chest x-ray, 05/18/2019:  FINDINGS: A portable AP view of the chest was obtained  Fully upright.     The lungs are clear. A calcified AP window node and calcified right hilar nodes are again noted, indicative of old granulomatous disease. The cardiac silhouette is normal in size. The descending aorta is tortuous. No pneumothorax or pleural effusion is identified. The bones are normal for age.     IMPRESSION: Normal chest radiograph.    Abdomen/pelvis CT, 06/03/2019:  IMPRESSION:  1. No clearly acute process in the abdomen or pelvis. Normal appendix.  2. There are multiple low-attenuation lesions in the left and right hepatic lobes. The largest of these appear to be cysts while the smaller are too small to characterize on noncontrast CT. Multiphase protocol CT or MRI if the patient is a candidate is recommended for further assessment in the absence prior outside studies documenting long-term stability.  3. Less than 5 mm noncalcified nodule in the superior segment left lower lobe. See Fleischner recommendations below.       Tissue Pathology, 08/12/2019:  Final Diagnosis    1. DUODENUM BIOPSY:  Normal  villous architecture with no significant histopathologic change.  No parasites noted.  No villous blunting or increased intraepithelial lymphocytes suggestive of celiac disease.   2. GASTRIC BIOPSY:  Minimal chronic gastritis without activity.  Negative for intestinal metaplasia and dysplasia.  No Helicobacter pylori-like organisms identified on routine stains.   3. DISTAL ESOPHAGUS BIOPSY:  Squamous mucosa with mild basal layer hyperplasia and mild increase in intraepithelial leukocytes without eosinophils.  Gastric cardia-type mucosa showing mild chronic gastritis without activity with reactive features.  Histologic changes are suggestive of reflux esophagitis.  Negative for intestinal metaplasia and dysplasia.   4. ESOPHAGUS BIOPSY:  Squamous mucosa with mild vascular ectasia; otherwise, no significant histopathologic change.  Histologic changes present are minimal and felt to be nonspecific.  Negative for intestinal metaplasia, dysplasia, and glandular mucosa.           Assessment:   Overall continued acceptable course with no new interim cardiopulmonary complaints with good functional status. We will defer additional diagnostic or therapeutic intervention from a cardiac perspective at this time.  Patient may need to consider GI reassessment in view of her intermittent dysphagia and odynophagia.       Diagnosis Plan   1. Palpitations  Infrequent; Continue current treatment.    2. Heart murmur  Stable examination and asymptomatic   3. Essential hypertension  Overall acceptable control following weight loss on current medical regimen and diet   4. Hypertensive heart disease without heart failure  No recurrent angina pectoris or CHF on current activity schedule; continue current treatment            Plan:         1. Patient to continue current medications and close follow up with the above providers.  2. Influenza immunization is strongly recommended.  3. Tentative cardiology follow up in March/April 2020, or  patient may return sooner PRN.    Transcribed by Yandy Shah for Dr. Paramjit Beckford at 11:55 AM on 09/27/2019    I, Paramjit Beckford MD, [facility], personally performed the services described in this documentation as scribed by the above named individual in my presence, and it is both accurate and complete. At 12:54 PM on 09/27/2019

## 2019-10-10 ENCOUNTER — TELEPHONE (OUTPATIENT)
Dept: GASTROENTEROLOGY | Facility: CLINIC | Age: 59
End: 2019-10-10

## 2019-10-10 NOTE — TELEPHONE ENCOUNTER
She can continue esomeprazole and can see me in clinic if she would like to.  Otherwise, she can follow up with her PCP.

## 2019-10-11 NOTE — TELEPHONE ENCOUNTER
Called pt to inform of recommendation per Dr. Mukherjee; no answer, Left details per Voicemail as well as office number to return call if she has questions or concerns.

## 2019-12-30 ENCOUNTER — APPOINTMENT (OUTPATIENT)
Dept: GENERAL RADIOLOGY | Facility: HOSPITAL | Age: 59
End: 2019-12-30

## 2019-12-30 ENCOUNTER — HOSPITAL ENCOUNTER (EMERGENCY)
Facility: HOSPITAL | Age: 59
Discharge: HOME | End: 2019-12-30
Attending: EMERGENCY MEDICINE | Admitting: EMERGENCY MEDICINE

## 2019-12-30 VITALS
DIASTOLIC BLOOD PRESSURE: 91 MMHG | HEIGHT: 65 IN | OXYGEN SATURATION: 100 % | SYSTOLIC BLOOD PRESSURE: 160 MMHG | RESPIRATION RATE: 17 BRPM | BODY MASS INDEX: 25.33 KG/M2 | TEMPERATURE: 98.5 F | HEART RATE: 67 BPM | WEIGHT: 152 LBS

## 2019-12-30 DIAGNOSIS — E83.52 HYPERCALCEMIA: ICD-10-CM

## 2019-12-30 DIAGNOSIS — I10 HYPERTENSION, UNSPECIFIED TYPE: ICD-10-CM

## 2019-12-30 DIAGNOSIS — M79.18 MYOFASCIAL PAIN: ICD-10-CM

## 2019-12-30 DIAGNOSIS — T50.905A MEDICATION SIDE EFFECT, INITIAL ENCOUNTER: ICD-10-CM

## 2019-12-30 DIAGNOSIS — R42 DIZZINESS: Primary | ICD-10-CM

## 2019-12-30 LAB
ALBUMIN SERPL-MCNC: 4.8 G/DL (ref 3.5–5.2)
ALBUMIN/GLOB SERPL: 1.5 G/DL
ALP SERPL-CCNC: 75 U/L (ref 39–117)
ALT SERPL W P-5'-P-CCNC: 14 U/L (ref 1–33)
ANION GAP SERPL CALCULATED.3IONS-SCNC: 11 MMOL/L (ref 5–15)
AST SERPL-CCNC: 18 U/L (ref 1–32)
BACTERIA UR QL AUTO: ABNORMAL /HPF
BASOPHILS # BLD AUTO: 0.03 10*3/MM3 (ref 0–0.2)
BASOPHILS NFR BLD AUTO: 0.6 % (ref 0–1.5)
BILIRUB SERPL-MCNC: 0.3 MG/DL (ref 0.2–1.2)
BILIRUB UR QL STRIP: NEGATIVE
BUN BLD-MCNC: 15 MG/DL (ref 8–23)
BUN/CREAT SERPL: 18.5 (ref 7–25)
CALCIUM SPEC-SCNC: 10.7 MG/DL (ref 8.6–10.5)
CHLORIDE SERPL-SCNC: 100 MMOL/L (ref 98–107)
CLARITY UR: ABNORMAL
CO2 SERPL-SCNC: 29 MMOL/L (ref 22–29)
COLOR UR: YELLOW
CREAT BLD-MCNC: 0.81 MG/DL (ref 0.57–1)
DEPRECATED RDW RBC AUTO: 39.1 FL (ref 37–54)
EOSINOPHIL # BLD AUTO: 0.1 10*3/MM3 (ref 0–0.4)
EOSINOPHIL NFR BLD AUTO: 1.9 % (ref 0.3–6.2)
ERYTHROCYTE [DISTWIDTH] IN BLOOD BY AUTOMATED COUNT: 12.2 % (ref 12.3–15.4)
GFR SERPL CREATININE-BSD FRML MDRD: 87 ML/MIN/1.73
GLOBULIN UR ELPH-MCNC: 3.1 GM/DL
GLUCOSE BLD-MCNC: 87 MG/DL (ref 65–99)
GLUCOSE UR STRIP-MCNC: NEGATIVE MG/DL
HCT VFR BLD AUTO: 43.3 % (ref 34–46.6)
HGB BLD-MCNC: 13.4 G/DL (ref 12–15.9)
HGB UR QL STRIP.AUTO: NEGATIVE
HOLD SPECIMEN: NORMAL
HOLD SPECIMEN: NORMAL
HYALINE CASTS UR QL AUTO: ABNORMAL /LPF
IMM GRANULOCYTES # BLD AUTO: 0.01 10*3/MM3 (ref 0–0.05)
IMM GRANULOCYTES NFR BLD AUTO: 0.2 % (ref 0–0.5)
KETONES UR QL STRIP: NEGATIVE
LEUKOCYTE ESTERASE UR QL STRIP.AUTO: ABNORMAL
LYMPHOCYTES # BLD AUTO: 1.93 10*3/MM3 (ref 0.7–3.1)
LYMPHOCYTES NFR BLD AUTO: 37.2 % (ref 19.6–45.3)
MAGNESIUM SERPL-MCNC: 2.2 MG/DL (ref 1.6–2.4)
MCH RBC QN AUTO: 27.2 PG (ref 26.6–33)
MCHC RBC AUTO-ENTMCNC: 30.9 G/DL (ref 31.5–35.7)
MCV RBC AUTO: 88 FL (ref 79–97)
MONOCYTES # BLD AUTO: 0.29 10*3/MM3 (ref 0.1–0.9)
MONOCYTES NFR BLD AUTO: 5.6 % (ref 5–12)
NEUTROPHILS # BLD AUTO: 2.83 10*3/MM3 (ref 1.7–7)
NEUTROPHILS NFR BLD AUTO: 54.5 % (ref 42.7–76)
NITRITE UR QL STRIP: NEGATIVE
NRBC BLD AUTO-RTO: 0 /100 WBC (ref 0–0.2)
PH UR STRIP.AUTO: 8 [PH] (ref 5–8)
PLATELET # BLD AUTO: 217 10*3/MM3 (ref 140–450)
PMV BLD AUTO: 10.8 FL (ref 6–12)
POTASSIUM BLD-SCNC: 3.9 MMOL/L (ref 3.5–5.2)
PROT SERPL-MCNC: 7.9 G/DL (ref 6–8.5)
PROT UR QL STRIP: NEGATIVE
RBC # BLD AUTO: 4.92 10*6/MM3 (ref 3.77–5.28)
RBC # UR: ABNORMAL /HPF
REF LAB TEST METHOD: ABNORMAL
SODIUM BLD-SCNC: 140 MMOL/L (ref 136–145)
SP GR UR STRIP: 1.02 (ref 1–1.03)
SQUAMOUS #/AREA URNS HPF: ABNORMAL /HPF
TROPONIN T SERPL-MCNC: <0.01 NG/ML (ref 0–0.03)
UROBILINOGEN UR QL STRIP: ABNORMAL
WBC NRBC COR # BLD: 5.19 10*3/MM3 (ref 3.4–10.8)
WBC UR QL AUTO: ABNORMAL /HPF
WHOLE BLOOD HOLD SPECIMEN: NORMAL
WHOLE BLOOD HOLD SPECIMEN: NORMAL

## 2019-12-30 PROCEDURE — 99283 EMERGENCY DEPT VISIT LOW MDM: CPT

## 2019-12-30 PROCEDURE — 83735 ASSAY OF MAGNESIUM: CPT

## 2019-12-30 PROCEDURE — 80053 COMPREHEN METABOLIC PANEL: CPT

## 2019-12-30 PROCEDURE — 93005 ELECTROCARDIOGRAM TRACING: CPT | Performed by: EMERGENCY MEDICINE

## 2019-12-30 PROCEDURE — 85025 COMPLETE CBC W/AUTO DIFF WBC: CPT

## 2019-12-30 PROCEDURE — 84484 ASSAY OF TROPONIN QUANT: CPT

## 2019-12-30 PROCEDURE — 71045 X-RAY EXAM CHEST 1 VIEW: CPT

## 2019-12-30 PROCEDURE — 81001 URINALYSIS AUTO W/SCOPE: CPT | Performed by: EMERGENCY MEDICINE

## 2019-12-30 RX ORDER — LIDOCAINE 50 MG/G
1 PATCH TOPICAL
Status: DISCONTINUED | OUTPATIENT
Start: 2019-12-30 | End: 2019-12-30 | Stop reason: HOSPADM

## 2019-12-30 RX ORDER — SODIUM CHLORIDE 0.9 % (FLUSH) 0.9 %
10 SYRINGE (ML) INJECTION AS NEEDED
Status: DISCONTINUED | OUTPATIENT
Start: 2019-12-30 | End: 2019-12-30 | Stop reason: HOSPADM

## 2020-01-03 ENCOUNTER — OFFICE VISIT (OUTPATIENT)
Dept: GASTROENTEROLOGY | Facility: CLINIC | Age: 60
End: 2020-01-03

## 2020-01-03 VITALS
TEMPERATURE: 97.5 F | BODY MASS INDEX: 25.99 KG/M2 | HEART RATE: 78 BPM | SYSTOLIC BLOOD PRESSURE: 138 MMHG | DIASTOLIC BLOOD PRESSURE: 90 MMHG | OXYGEN SATURATION: 98 % | HEIGHT: 65 IN | WEIGHT: 156 LBS

## 2020-01-03 DIAGNOSIS — R10.9 CHRONIC ABDOMINAL PAIN: Primary | ICD-10-CM

## 2020-01-03 DIAGNOSIS — K21.9 GASTROESOPHAGEAL REFLUX DISEASE, ESOPHAGITIS PRESENCE NOT SPECIFIED: ICD-10-CM

## 2020-01-03 DIAGNOSIS — G89.29 CHRONIC ABDOMINAL PAIN: Primary | ICD-10-CM

## 2020-01-03 PROCEDURE — 99214 OFFICE O/P EST MOD 30 MIN: CPT | Performed by: INTERNAL MEDICINE

## 2020-01-03 RX ORDER — AMLODIPINE BESYLATE 2.5 MG/1
TABLET ORAL DAILY
COMMUNITY
End: 2020-02-13

## 2020-01-03 RX ORDER — PANTOPRAZOLE SODIUM 40 MG/1
TABLET, DELAYED RELEASE ORAL
Qty: 90 TABLET | Refills: 3 | Status: SHIPPED | OUTPATIENT
Start: 2020-01-03 | End: 2020-01-31 | Stop reason: SINTOL

## 2020-01-03 NOTE — PROGRESS NOTES
PCP: Franklin Will MD    Chief Complaint   Patient presents with   • Abdominal Pain       History of Present Illness:   Nancy Albert is a 59 y.o. female who presents to GI clinic as a follow up for gerd and atypical chest pain. She admits to feeling substernal pressure. For this she went to ED. Substernal discomfort occurs intermittently throughout day. She wakes up in the morning with sour taste in mouth. Due to not liking medicines, she has tried to come off ppi after doing a couple 4 week cycles.  She also complains of weakness. ED found her to be hypertensive. She thinks some of her discomfort is related to norvasc. Occasionally has pain that radiates into the back. This is characterized as sharp and may last a few hours.    Past Medical History:   Diagnosis Date   • Arthritis 2019   • GERD (gastroesophageal reflux disease)    • Heart murmur    • Hypertension        Past Surgical History:   Procedure Laterality Date   • HYSTERECTOMY  07/15/2008   • OOPHORECTOMY Left 07/15/2008         Current Outpatient Medications:   •  esomeprazole (nexIUM) 40 MG capsule, 20 mg Daily. OTC, Disp: , Rfl:   •  losartan-hydrochlorothiazide (HYZAAR) 100-25 MG per tablet, Take 1 tablet by mouth Daily., Disp: , Rfl:   •  Multiple Vitamins-Minerals (MULTIVITAMIN ADULT PO), Take  by mouth Daily., Disp: , Rfl:   •  amLODIPine (NORVASC) 2.5 MG tablet, Daily., Disp: , Rfl:   •  verapamil SR (CALAN-SR) 180 MG CR tablet, Take 1 tablet by mouth Every Night., Disp: 30 tablet, Rfl: 0    Allergies   Allergen Reactions   • Beta Adrenergic Blockers GI Intolerance       Family History   Problem Relation Age of Onset   • Cancer Mother    • Arthritis Father    • No Known Problems Sister    • No Known Problems Brother    • Pancreatic cancer Maternal Grandmother    • No Known Problems Maternal Grandfather    • No Known Problems Paternal Grandmother    • No Known Problems Paternal Grandfather    • No Known Problems Daughter    • Breast cancer Neg  Hx    • Ovarian cancer Neg Hx        Social History     Socioeconomic History   • Marital status: Single     Spouse name: Not on file   • Number of children: Not on file   • Years of education: Not on file   • Highest education level: Not on file   Occupational History   • Occupation: Retired   Tobacco Use   • Smoking status: Never Smoker   • Smokeless tobacco: Never Used   Substance and Sexual Activity   • Alcohol use: No   • Drug use: No   • Sexual activity: Defer   Social History Narrative    Caffeine: None    Patient lives at her home with her 11 year old grandson       Review of Systems   Constitutional: Positive for appetite change and fatigue. Negative for activity change and fever.   HENT: Negative for nosebleeds.    Eyes: Negative for pain.   Respiratory: Negative for choking.    Cardiovascular: Positive for chest pain.   Gastrointestinal: Positive for abdominal distention (bloating) and abdominal pain.        Acid reflux   Endocrine: Negative.    Genitourinary: Negative.    Musculoskeletal: Negative.    Skin: Negative.    Allergic/Immunologic: Negative for food allergies and immunocompromised state.   Neurological: Negative.    Hematological: Negative.    Psychiatric/Behavioral: Negative for confusion and suicidal ideas.         Vitals:    01/03/20 1144   BP: 138/90   Pulse: 78   Temp: 97.5 °F (36.4 °C)   SpO2: 98%       Physical Exam  General Appearance:  Vitals as above. no acute distress  Head/face:  Normocephalic, atraumatic  Eyes:   EOMI, no conjunctivitis or icterus   Nose/Sinuses:  Nares patent bilaterally without discharge or lesions  Mouth/Throat:  Posterior pharynx is pink without drainage or exudates;  dentition is in good condition and repair  Neck:  trachea is midline, no thyromegaly  Neurologic:  Alert; no focal deficits; age appropriate behavior and speech  Psychiatric: mood and affect are congruent  Skin: no rash or cyanosis.  Abdomen: soft, no rebound    Assessment/Plan  1.) Atypical chest  pain  Workup: ED cardiology rule out. Would recommend outpatient cardiology assessment  Egd: 8/2019  Will send protonix    2.) Hypercalcemia  Recommend assessment by her pcp to rule out hctz vs parathyroid     3.) weakness  4.) Chronic pain radiating into back  Will assess u/s abdomen and refer to cardiology    5.) chest pressure  I like the maneuver that the ED made with verapimil to cover GABINO.  When combined with htn, this may improve some symptoms of chest pressure if non cardiac etiology.    rtc in 6 months.      Nicola Mukherjee MD  1/3/2020

## 2020-01-07 ENCOUNTER — HOSPITAL ENCOUNTER (OUTPATIENT)
Dept: ULTRASOUND IMAGING | Facility: HOSPITAL | Age: 60
Discharge: HOME | End: 2020-01-07
Admitting: INTERNAL MEDICINE

## 2020-01-07 DIAGNOSIS — G89.29 CHRONIC ABDOMINAL PAIN: ICD-10-CM

## 2020-01-07 DIAGNOSIS — R10.9 CHRONIC ABDOMINAL PAIN: ICD-10-CM

## 2020-01-07 PROCEDURE — 76700 US EXAM ABDOM COMPLETE: CPT

## 2020-01-22 ENCOUNTER — TRANSCRIBE ORDERS (OUTPATIENT)
Dept: ADMINISTRATIVE | Facility: HOSPITAL | Age: 60
End: 2020-01-22

## 2020-01-22 DIAGNOSIS — Z12.31 VISIT FOR SCREENING MAMMOGRAM: Primary | ICD-10-CM

## 2020-01-22 DIAGNOSIS — K21.9 GASTROESOPHAGEAL REFLUX DISEASE, ESOPHAGITIS PRESENCE NOT SPECIFIED: Primary | ICD-10-CM

## 2020-01-23 ENCOUNTER — TELEPHONE (OUTPATIENT)
Dept: GASTROENTEROLOGY | Facility: CLINIC | Age: 60
End: 2020-01-23

## 2020-01-23 NOTE — TELEPHONE ENCOUNTER
I spoke with patient this afternoon. Explained ultrasound results with patient. Patient voiced understanding.

## 2020-01-24 ENCOUNTER — TELEPHONE (OUTPATIENT)
Dept: GASTROENTEROLOGY | Facility: CLINIC | Age: 60
End: 2020-01-24

## 2020-01-24 NOTE — TELEPHONE ENCOUNTER
Dr. Mukherjee-  Ms. Albert called to inform you the pantoprazole is causing nausea. She would like to know if she can take a half dose or what is your advice?  Please Advise.  Thank you,  Laxmi

## 2020-01-25 NOTE — TELEPHONE ENCOUNTER
Got the message. She can stop taking pantoprazole.  Will switch her to nexium    nexium 20 mg  20 mg po daily  90   3 refills

## 2020-01-29 ENCOUNTER — TREATMENT (OUTPATIENT)
Dept: PHYSICAL THERAPY | Facility: CLINIC | Age: 60
End: 2020-01-29

## 2020-01-29 DIAGNOSIS — M54.50 CHRONIC LOW BACK PAIN, UNSPECIFIED BACK PAIN LATERALITY, UNSPECIFIED WHETHER SCIATICA PRESENT: Primary | ICD-10-CM

## 2020-01-29 DIAGNOSIS — G89.29 CHRONIC LOW BACK PAIN, UNSPECIFIED BACK PAIN LATERALITY, UNSPECIFIED WHETHER SCIATICA PRESENT: Primary | ICD-10-CM

## 2020-01-29 PROCEDURE — 97110 THERAPEUTIC EXERCISES: CPT | Performed by: PHYSICAL THERAPIST

## 2020-01-29 PROCEDURE — 97140 MANUAL THERAPY 1/> REGIONS: CPT | Performed by: PHYSICAL THERAPIST

## 2020-01-29 PROCEDURE — 97162 PT EVAL MOD COMPLEX 30 MIN: CPT | Performed by: PHYSICAL THERAPIST

## 2020-01-31 DIAGNOSIS — K21.9 GASTROESOPHAGEAL REFLUX DISEASE, ESOPHAGITIS PRESENCE NOT SPECIFIED: Primary | ICD-10-CM

## 2020-01-31 NOTE — TELEPHONE ENCOUNTER
I called patient back. She stated she called last week and didn't get a call back. She is still feeling horrible. Bitter taste to her mouth, lost of appetitie, no energy with weakness,and shaky feeling when she wakes up. She agreed to try the Nexium 20 mg daily. Please advise.

## 2020-02-06 ENCOUNTER — APPOINTMENT (OUTPATIENT)
Dept: CT IMAGING | Facility: HOSPITAL | Age: 60
End: 2020-02-06

## 2020-02-06 ENCOUNTER — HOSPITAL ENCOUNTER (EMERGENCY)
Facility: HOSPITAL | Age: 60
Discharge: HOME | End: 2020-02-06
Attending: EMERGENCY MEDICINE | Admitting: EMERGENCY MEDICINE

## 2020-02-06 ENCOUNTER — TELEPHONE (OUTPATIENT)
Dept: GASTROENTEROLOGY | Facility: CLINIC | Age: 60
End: 2020-02-06

## 2020-02-06 VITALS
HEIGHT: 65 IN | BODY MASS INDEX: 24.99 KG/M2 | WEIGHT: 150 LBS | TEMPERATURE: 98.6 F | RESPIRATION RATE: 16 BRPM | OXYGEN SATURATION: 96 % | HEART RATE: 61 BPM | SYSTOLIC BLOOD PRESSURE: 137 MMHG | DIASTOLIC BLOOD PRESSURE: 77 MMHG

## 2020-02-06 DIAGNOSIS — R11.0 NAUSEA: Primary | ICD-10-CM

## 2020-02-06 DIAGNOSIS — R10.13 EPIGASTRIC PAIN: ICD-10-CM

## 2020-02-06 LAB
ALBUMIN SERPL-MCNC: 4.8 G/DL (ref 3.5–5.2)
ALBUMIN/GLOB SERPL: 1.2 G/DL
ALP SERPL-CCNC: 71 U/L (ref 39–117)
ALT SERPL W P-5'-P-CCNC: 12 U/L (ref 1–33)
ANION GAP SERPL CALCULATED.3IONS-SCNC: 13 MMOL/L (ref 5–15)
AST SERPL-CCNC: 29 U/L (ref 1–32)
BACTERIA UR QL AUTO: ABNORMAL /HPF
BASOPHILS # BLD AUTO: 0.02 10*3/MM3 (ref 0–0.2)
BASOPHILS NFR BLD AUTO: 0.4 % (ref 0–1.5)
BILIRUB SERPL-MCNC: 0.5 MG/DL (ref 0.2–1.2)
BILIRUB UR QL STRIP: NEGATIVE
BUN BLD-MCNC: 15 MG/DL (ref 8–23)
BUN/CREAT SERPL: 17.9 (ref 7–25)
CALCIUM SPEC-SCNC: 10.2 MG/DL (ref 8.6–10.5)
CHLORIDE SERPL-SCNC: 98 MMOL/L (ref 98–107)
CLARITY UR: CLEAR
CO2 SERPL-SCNC: 28 MMOL/L (ref 22–29)
COLOR UR: YELLOW
CREAT BLD-MCNC: 0.84 MG/DL (ref 0.57–1)
DEPRECATED RDW RBC AUTO: 39 FL (ref 37–54)
EOSINOPHIL # BLD AUTO: 0.04 10*3/MM3 (ref 0–0.4)
EOSINOPHIL NFR BLD AUTO: 0.8 % (ref 0.3–6.2)
ERYTHROCYTE [DISTWIDTH] IN BLOOD BY AUTOMATED COUNT: 12.2 % (ref 12.3–15.4)
GFR SERPL CREATININE-BSD FRML MDRD: 84 ML/MIN/1.73
GLOBULIN UR ELPH-MCNC: 3.9 GM/DL
GLUCOSE BLD-MCNC: 80 MG/DL (ref 65–99)
GLUCOSE UR STRIP-MCNC: NEGATIVE MG/DL
HCT VFR BLD AUTO: 43.5 % (ref 34–46.6)
HGB BLD-MCNC: 13.7 G/DL (ref 12–15.9)
HGB UR QL STRIP.AUTO: NEGATIVE
HYALINE CASTS UR QL AUTO: ABNORMAL /LPF
IMM GRANULOCYTES # BLD AUTO: 0.01 10*3/MM3 (ref 0–0.05)
IMM GRANULOCYTES NFR BLD AUTO: 0.2 % (ref 0–0.5)
KETONES UR QL STRIP: NEGATIVE
LEUKOCYTE ESTERASE UR QL STRIP.AUTO: ABNORMAL
LIPASE SERPL-CCNC: 67 U/L (ref 13–60)
LYMPHOCYTES # BLD AUTO: 1.94 10*3/MM3 (ref 0.7–3.1)
LYMPHOCYTES NFR BLD AUTO: 38.7 % (ref 19.6–45.3)
MCH RBC QN AUTO: 27.7 PG (ref 26.6–33)
MCHC RBC AUTO-ENTMCNC: 31.5 G/DL (ref 31.5–35.7)
MCV RBC AUTO: 88.1 FL (ref 79–97)
MONOCYTES # BLD AUTO: 0.3 10*3/MM3 (ref 0.1–0.9)
MONOCYTES NFR BLD AUTO: 6 % (ref 5–12)
NEUTROPHILS # BLD AUTO: 2.7 10*3/MM3 (ref 1.7–7)
NEUTROPHILS NFR BLD AUTO: 53.9 % (ref 42.7–76)
NITRITE UR QL STRIP: NEGATIVE
NRBC BLD AUTO-RTO: 0 /100 WBC (ref 0–0.2)
PH UR STRIP.AUTO: 7.5 [PH] (ref 5–8)
PLATELET # BLD AUTO: 239 10*3/MM3 (ref 140–450)
PMV BLD AUTO: 11.7 FL (ref 6–12)
POTASSIUM BLD-SCNC: 4.8 MMOL/L (ref 3.5–5.2)
PROT SERPL-MCNC: 8.7 G/DL (ref 6–8.5)
PROT UR QL STRIP: NEGATIVE
RBC # BLD AUTO: 4.94 10*6/MM3 (ref 3.77–5.28)
RBC # UR: ABNORMAL /HPF
REF LAB TEST METHOD: ABNORMAL
SODIUM BLD-SCNC: 139 MMOL/L (ref 136–145)
SP GR UR STRIP: 1.01 (ref 1–1.03)
SQUAMOUS #/AREA URNS HPF: ABNORMAL /HPF
TROPONIN T SERPL-MCNC: <0.01 NG/ML (ref 0–0.03)
UROBILINOGEN UR QL STRIP: ABNORMAL
WBC NRBC COR # BLD: 5.01 10*3/MM3 (ref 3.4–10.8)
WBC UR QL AUTO: ABNORMAL /HPF

## 2020-02-06 PROCEDURE — 84484 ASSAY OF TROPONIN QUANT: CPT | Performed by: PHYSICIAN ASSISTANT

## 2020-02-06 PROCEDURE — 99284 EMERGENCY DEPT VISIT MOD MDM: CPT

## 2020-02-06 PROCEDURE — 93005 ELECTROCARDIOGRAM TRACING: CPT | Performed by: PHYSICIAN ASSISTANT

## 2020-02-06 PROCEDURE — 80053 COMPREHEN METABOLIC PANEL: CPT | Performed by: PHYSICIAN ASSISTANT

## 2020-02-06 PROCEDURE — 85025 COMPLETE CBC W/AUTO DIFF WBC: CPT | Performed by: PHYSICIAN ASSISTANT

## 2020-02-06 PROCEDURE — 83690 ASSAY OF LIPASE: CPT | Performed by: PHYSICIAN ASSISTANT

## 2020-02-06 PROCEDURE — 81001 URINALYSIS AUTO W/SCOPE: CPT | Performed by: PHYSICIAN ASSISTANT

## 2020-02-06 PROCEDURE — 74176 CT ABD & PELVIS W/O CONTRAST: CPT

## 2020-02-06 RX ORDER — ALUMINA, MAGNESIA, AND SIMETHICONE 2400; 2400; 240 MG/30ML; MG/30ML; MG/30ML
15 SUSPENSION ORAL ONCE
Status: COMPLETED | OUTPATIENT
Start: 2020-02-06 | End: 2020-02-06

## 2020-02-06 RX ORDER — LIDOCAINE HYDROCHLORIDE 20 MG/ML
15 SOLUTION OROPHARYNGEAL ONCE
Status: COMPLETED | OUTPATIENT
Start: 2020-02-06 | End: 2020-02-06

## 2020-02-06 RX ORDER — METOCLOPRAMIDE 10 MG/1
10 TABLET ORAL
Qty: 40 TABLET | Refills: 0 | Status: SHIPPED | OUTPATIENT
Start: 2020-02-06 | End: 2020-03-23

## 2020-02-06 RX ADMIN — ALUMINUM HYDROXIDE, MAGNESIUM HYDROXIDE, AND DIMETHICONE 15 ML: 400; 400; 40 SUSPENSION ORAL at 14:07

## 2020-02-06 RX ADMIN — LIDOCAINE HYDROCHLORIDE 15 ML: 20 SOLUTION ORAL; TOPICAL at 14:07

## 2020-02-06 NOTE — TELEPHONE ENCOUNTER
PATIENT CALLED TO INFORM DR. BLOUNT SHE IS STILL EXPERIENCING ABDOMINAL PAIN, DIARRHEA (GREEN COLORL), RAPID WEIGHT LOSS, NAUSEA, HEADACHE, AND DIZZINESS. SHE STATES SHE HAD LABS DRAWN AT DR. ANDRADE LAST WEEK. ADVISED PATIENT WITH THESE ONGOING ISSUES AND THE GREEN COLOR DIARRHEA AND DIZZINESS TO GO TO ED. PATIENT AND I BOTH AGREE IT IS BEST TO SEEK TREATMENT AT ED. ADVISED PATIENT IF SHE HAS ANY OTHER CONCERNS OR QUESTIONS TO CALL US. PATIENT VOICED UNDERSTANDING.

## 2020-02-06 NOTE — ED PROVIDER NOTES
Subjective   Nancy Albert is a 59 y.o. female who presents to the ED with complaints of upper abdominal pain for the past 5 weeks. She describes her pain as dull and rates it as 5/10 in severity. Additionally, she endorses fatigue, generalized weakness, nausea, decreased appetite, sore throat, and weight loss. She states that she lost about 8 lbs in 10 days. She denies any associated abdominal distension or vomiting. She advises that she was seen for similar symptoms here on 12-30-19. She reports that at the time her workup was normal, other than hypertension. The patient has also been seen by her PCP, Dr. Will, on 1-22-19 and had a normal work up other than slightly abnormal WBC. She has since been seen by Dr. Virk, general practice, 3 days ago and had labs drawn. The patient states she got the results this morning and they were normal. The patient advises that she had an endoscopy in May 2019 with Dr. Mukherjee GI, and was diagnosed with acid reflux. The patient reports that she has been on 3 different antacids since and nothing has helped. She stopped taking her antacids 3 days ago. The patient is concerned that her symptoms are side effects of her blood pressure medications. She states that she stopped Amlodipine and switched to Verapamil a few weeks ago. She denies any history of cancer. She has a history of heart murmur, HTN, GERD, and arthritis. Her past surgical history includes a hysterectomy and oophorectomy. Her PCP is Dr. Will. There are no other acute complaints at this time.      History provided by:  Patient  Abdominal Pain   Pain location:  RUQ, LUQ and epigastric  Pain quality: dull    Pain radiates to:  Does not radiate  Pain severity:  Moderate  Onset quality:  Sudden  Duration:  5 weeks  Timing:  Constant  Progression:  Unchanged  Chronicity:  New  Relieved by:  Nothing  Worsened by:  Nothing  Ineffective treatments:  Antacids  Associated symptoms: fatigue, nausea and sore throat     Associated symptoms: no vomiting    Risk factors: not obese        Review of Systems   Constitutional: Positive for appetite change (decreased), fatigue and unexpected weight change (loss).   HENT: Positive for sore throat.    Gastrointestinal: Positive for abdominal pain and nausea. Negative for abdominal distention and vomiting.   Neurological: Positive for weakness (generalized).   All other systems reviewed and are negative.      Past Medical History:   Diagnosis Date   • Arthritis 2019   • GERD (gastroesophageal reflux disease)    • Heart murmur    • Hypertension        Allergies   Allergen Reactions   • Beta Adrenergic Blockers GI Intolerance       Past Surgical History:   Procedure Laterality Date   • HYSTERECTOMY  07/15/2008   • OOPHORECTOMY Left 07/15/2008       Family History   Problem Relation Age of Onset   • Cancer Mother    • Arthritis Father    • No Known Problems Sister    • No Known Problems Brother    • Pancreatic cancer Maternal Grandmother    • No Known Problems Maternal Grandfather    • No Known Problems Paternal Grandmother    • No Known Problems Paternal Grandfather    • No Known Problems Daughter    • Breast cancer Neg Hx    • Ovarian cancer Neg Hx        Social History     Socioeconomic History   • Marital status: Single     Spouse name: Not on file   • Number of children: Not on file   • Years of education: Not on file   • Highest education level: Not on file   Occupational History   • Occupation: Retired   Tobacco Use   • Smoking status: Never Smoker   • Smokeless tobacco: Never Used   Substance and Sexual Activity   • Alcohol use: No   • Drug use: No   • Sexual activity: Defer   Social History Narrative    Caffeine: None    Patient lives at her home with her 11 year old grandson         Objective   Physical Exam   Constitutional: She is oriented to person, place, and time. She appears well-developed and well-nourished.   HENT:   Head: Normocephalic and atraumatic.   Nose: Nose normal.    Eyes: Conjunctivae are normal. No scleral icterus.   Neck: Normal range of motion. Neck supple.   Cardiovascular: Normal rate, regular rhythm and normal heart sounds.   No murmur heard.  Pulmonary/Chest: Effort normal and breath sounds normal. No respiratory distress.   Abdominal: Bowel sounds are normal. There is tenderness in the epigastric area and left upper quadrant. There is no rigidity, no rebound and no guarding.   Tenderness in the epigastrium and left upper quadrant region. No guarding, rebound, or rigidity. Some fullness. Positive bowel sounds all 4 quadrants.   Musculoskeletal: Normal range of motion. She exhibits no deformity.   Neurological: She is alert and oriented to person, place, and time.   Skin: Skin is warm and dry.   Psychiatric: She has a normal mood and affect. Her behavior is normal.   Nursing note and vitals reviewed.      Procedures         ED Course  ED Course as of Feb 06 1724   Thu Feb 06, 2020   1650 At bedside re-evaluating the patient and updating her on labs/imaging. -JDB    [NP]      ED Course User Index  [NP] Judy Dseir     Recent Results (from the past 24 hour(s))   Comprehensive Metabolic Panel    Collection Time: 02/06/20  2:19 PM   Result Value Ref Range    Glucose 80 65 - 99 mg/dL    BUN 15 8 - 23 mg/dL    Creatinine 0.84 0.57 - 1.00 mg/dL    Sodium 139 136 - 145 mmol/L    Potassium 4.8 3.5 - 5.2 mmol/L    Chloride 98 98 - 107 mmol/L    CO2 28.0 22.0 - 29.0 mmol/L    Calcium 10.2 8.6 - 10.5 mg/dL    Total Protein 8.7 (H) 6.0 - 8.5 g/dL    Albumin 4.80 3.50 - 5.20 g/dL    ALT (SGPT) 12 1 - 33 U/L    AST (SGOT) 29 1 - 32 U/L    Alkaline Phosphatase 71 39 - 117 U/L    Total Bilirubin 0.5 0.2 - 1.2 mg/dL    eGFR  African Amer 84 >60 mL/min/1.73    Globulin 3.9 gm/dL    A/G Ratio 1.2 g/dL    BUN/Creatinine Ratio 17.9 7.0 - 25.0    Anion Gap 13.0 5.0 - 15.0 mmol/L   Lipase    Collection Time: 02/06/20  2:19 PM   Result Value Ref Range    Lipase 67 (H) 13 - 60 U/L   Troponin     Collection Time: 02/06/20  2:19 PM   Result Value Ref Range    Troponin T <0.010 0.000 - 0.030 ng/mL   CBC Auto Differential    Collection Time: 02/06/20  2:19 PM   Result Value Ref Range    WBC 5.01 3.40 - 10.80 10*3/mm3    RBC 4.94 3.77 - 5.28 10*6/mm3    Hemoglobin 13.7 12.0 - 15.9 g/dL    Hematocrit 43.5 34.0 - 46.6 %    MCV 88.1 79.0 - 97.0 fL    MCH 27.7 26.6 - 33.0 pg    MCHC 31.5 31.5 - 35.7 g/dL    RDW 12.2 (L) 12.3 - 15.4 %    RDW-SD 39.0 37.0 - 54.0 fl    MPV 11.7 6.0 - 12.0 fL    Platelets 239 140 - 450 10*3/mm3    Neutrophil % 53.9 42.7 - 76.0 %    Lymphocyte % 38.7 19.6 - 45.3 %    Monocyte % 6.0 5.0 - 12.0 %    Eosinophil % 0.8 0.3 - 6.2 %    Basophil % 0.4 0.0 - 1.5 %    Immature Grans % 0.2 0.0 - 0.5 %    Neutrophils, Absolute 2.70 1.70 - 7.00 10*3/mm3    Lymphocytes, Absolute 1.94 0.70 - 3.10 10*3/mm3    Monocytes, Absolute 0.30 0.10 - 0.90 10*3/mm3    Eosinophils, Absolute 0.04 0.00 - 0.40 10*3/mm3    Basophils, Absolute 0.02 0.00 - 0.20 10*3/mm3    Immature Grans, Absolute 0.01 0.00 - 0.05 10*3/mm3    nRBC 0.0 0.0 - 0.2 /100 WBC   Urinalysis With Microscopic If Indicated (No Culture) - Urine, Clean Catch    Collection Time: 02/06/20  3:52 PM   Result Value Ref Range    Color, UA Yellow Yellow, Straw    Appearance, UA Clear Clear    pH, UA 7.5 5.0 - 8.0    Specific Gravity, UA 1.014 1.001 - 1.030    Glucose, UA Negative Negative    Ketones, UA Negative Negative    Bilirubin, UA Negative Negative    Blood, UA Negative Negative    Protein, UA Negative Negative    Leuk Esterase, UA Moderate (2+) (A) Negative    Nitrite, UA Negative Negative    Urobilinogen, UA 0.2 E.U./dL 0.2 - 1.0 E.U./dL   Urinalysis, Microscopic Only - Urine, Clean Catch    Collection Time: 02/06/20  3:52 PM   Result Value Ref Range    RBC, UA 0-2 None Seen, 0-2 /HPF    WBC, UA 6-12 (A) None Seen, 0-2 /HPF    Bacteria, UA 1+ (A) None Seen, Trace /HPF    Squamous Epithelial Cells, UA 3-6 (A) None Seen, 0-2 /HPF    Hyaline Casts,  UA None Seen 0 - 6 /LPF    Methodology Manual Light Microscopy      Note: In addition to lab results from this visit, the labs listed above may include labs taken at another facility or during a different encounter within the last 24 hours. Please correlate lab times with ED admission and discharge times for further clarification of the services performed during this visit.    CT Abdomen Pelvis Without Contrast   Preliminary Result   No acute intra-abdominal or pelvic process is appreciated.                                                                                                                                                                                                                                                                                                                                                                                                                                                                                                                                                                                                                                                                                                                                                                                                                                                                                                                                                                                                                                                                                                                                                                                                                                                                                                                                                                                                                                                                                                                                                                                                                                                                                                                                                                                                                                                                                                                                                                                                                                                                                                                                                                                                                                                                                                                                                                                                                                                                                                                                                                                                                                                                                                                                                                                                                                                                                                                                                                                                                                                                                                                                                                                                                                                                                                                                                   Redemonstration of numerous low attenuated lesions throughout the liver   most consistent with simple hepatic cysts, the largest within the mid   liver now measuring up to 1.6 cm in  maximum dimension, slightly   increased size from 6/3/2019. Some of these are indeterminate and   smaller and difficult to fully characterize as previously described and,   if clinically appropriate, follow-up MRI of the abdomen (liver mass   protocol) can be performed.       Similar small 4-5 mm left lower lobe pulmonary nodule unchanged from   6/recess 19 which requires no further follow-up in a low-risk individual   utilizing a 2017 Fleischner Society guidelines for nodule follow-up.       DICTATED:   02/06/2020   EDITED/ls :   02/06/2020             Vitals:    02/06/20 1500 02/06/20 1502 02/06/20 1600 02/06/20 1700   BP: 144/76  143/81 137/77   Pulse:  65 64 61   Resp:       Temp:       SpO2:  96% 99% 96%   Weight:       Height:         Medications   aluminum-magnesium hydroxide-simethicone (MAALOX MAX) 400-400-40 MG/5ML suspension 15 mL (15 mL Oral Given 2/6/20 1407)   Lidocaine Viscous HCl (XYLOCAINE) 2 % mouth solution 15 mL (15 mL Mouth/Throat Given 2/6/20 1407)     ECG/EMG Results (last 24 hours)     Procedure Component Value Units Date/Time    ECG 12 Lead [[id number]] Collected:  02/06/20 1422     Updated:  02/06/20 1423        ECG 12 Lead                                                        MDM  Number of Diagnoses or Management Options  Epigastric pain: new and requires workup  Nausea: new and requires workup     Amount and/or Complexity of Data Reviewed  Clinical lab tests: reviewed and ordered  Tests in the radiology section of CPT®: ordered and reviewed  Tests in the medicine section of CPT®: reviewed and ordered  Discuss the patient with other providers: yes    Patient Progress  Patient progress: stable      Final diagnoses:   Nausea   Epigastric pain       Documentation assistance provided by khalif Desir.  Information recorded by the khalif was done at my direction and has been verified and validated by me.             Judy Desir  02/06/20 1450       Franklin Curtis PA  02/06/20  [id number]

## 2020-02-10 ENCOUNTER — TELEPHONE (OUTPATIENT)
Dept: GASTROENTEROLOGY | Facility: CLINIC | Age: 60
End: 2020-02-10

## 2020-02-10 NOTE — TELEPHONE ENCOUNTER
Dr Mukherjee,  Patient called this morning and stated she is having some black stool. Yes, she has taken some PEPTO Bismol last night and this morning. I explained to patient that PEPTO can be the cause of black stool. Patient voiced understanding. Patient has an appointment with you tomorrow.

## 2020-02-11 ENCOUNTER — PREP FOR SURGERY (OUTPATIENT)
Dept: GASTROENTEROLOGY | Facility: CLINIC | Age: 60
End: 2020-02-11

## 2020-02-11 ENCOUNTER — OFFICE VISIT (OUTPATIENT)
Dept: GASTROENTEROLOGY | Facility: CLINIC | Age: 60
End: 2020-02-11

## 2020-02-11 VITALS
BODY MASS INDEX: 25.13 KG/M2 | SYSTOLIC BLOOD PRESSURE: 134 MMHG | DIASTOLIC BLOOD PRESSURE: 82 MMHG | TEMPERATURE: 98.5 F | WEIGHT: 151 LBS | HEART RATE: 72 BPM | OXYGEN SATURATION: 98 %

## 2020-02-11 DIAGNOSIS — R07.89 ATYPICAL CHEST PAIN: ICD-10-CM

## 2020-02-11 DIAGNOSIS — R11.0 NAUSEA: Primary | ICD-10-CM

## 2020-02-11 PROCEDURE — 99214 OFFICE O/P EST MOD 30 MIN: CPT | Performed by: INTERNAL MEDICINE

## 2020-02-11 RX ORDER — LOSARTAN POTASSIUM AND HYDROCHLOROTHIAZIDE 25; 100 MG/1; MG/1
TABLET ORAL
COMMUNITY
End: 2020-03-23

## 2020-02-11 RX ORDER — VERAPAMIL HYDROCHLORIDE 180 MG/1
CAPSULE, EXTENDED RELEASE ORAL
COMMUNITY
Start: 2020-01-09 | End: 2020-02-13

## 2020-02-11 RX ORDER — AMLODIPINE BESYLATE 5 MG/1
TABLET ORAL
COMMUNITY
Start: 2020-01-22 | End: 2020-02-13

## 2020-02-11 RX ORDER — DOXAZOSIN MESYLATE 4 MG/1
TABLET ORAL
COMMUNITY
End: 2020-02-13

## 2020-02-11 NOTE — PROGRESS NOTES
PCP: Franklin Will MD    Chief Complaint   Patient presents with   • Follow-up       History of Present Illness:   Nancy Albert is a 59 y.o. female who presents to GI clinic as a follow up for atypical chest pain. For this we performed an egd around 8/2019. The esophagus was fairly unremarkable. Mild gastritis.  She was placed on pantoprazole which did not help. Then nexium which has not helped.  She has developed epigastric sharp nonradiating discomfort that can last all day. She has nausea. She presented to ed for this around 12/30/19.  She is having a low quality of life. There have been some changes to bp meds. Verapamil possibly has reduced some of her chest pain.    Past Medical History:   Diagnosis Date   • Arthritis 2019   • GERD (gastroesophageal reflux disease)    • Heart murmur    • Hypertension        Past Surgical History:   Procedure Laterality Date   • HYSTERECTOMY  07/15/2008   • OOPHORECTOMY Left 07/15/2008         Current Outpatient Medications:   •  amLODIPine (NORVASC) 2.5 MG tablet, Daily., Disp: , Rfl:   •  esomeprazole (nexIUM) 20 MG capsule, Take 1 capsule by mouth Every Morning Before Breakfast., Disp: 90 capsule, Rfl: 3  •  losartan-hydrochlorothiazide (HYZAAR) 100-25 MG per tablet, Take 1 tablet by mouth Daily., Disp: , Rfl:   •  metoclopramide (REGLAN) 10 MG tablet, Take 1 tablet by mouth 4 (Four) Times a Day Before Meals & at Bedtime., Disp: 40 tablet, Rfl: 0  •  Multiple Vitamins-Minerals (MULTIVITAMIN ADULT PO), Take  by mouth Daily., Disp: , Rfl:   •  verapamil SR (CALAN-SR) 180 MG CR tablet, Take 1 tablet by mouth Every Night., Disp: 30 tablet, Rfl: 0    Allergies   Allergen Reactions   • Beta Adrenergic Blockers GI Intolerance       Family History   Problem Relation Age of Onset   • Cancer Mother    • Arthritis Father    • No Known Problems Sister    • No Known Problems Brother    • Pancreatic cancer Maternal Grandmother    • No Known Problems Maternal Grandfather    • No Known  Problems Paternal Grandmother    • No Known Problems Paternal Grandfather    • No Known Problems Daughter    • Breast cancer Neg Hx    • Ovarian cancer Neg Hx        Social History     Socioeconomic History   • Marital status: Single     Spouse name: Not on file   • Number of children: Not on file   • Years of education: Not on file   • Highest education level: Not on file   Occupational History   • Occupation: Retired   Tobacco Use   • Smoking status: Never Smoker   • Smokeless tobacco: Never Used   Substance and Sexual Activity   • Alcohol use: No   • Drug use: No   • Sexual activity: Defer   Social History Narrative    Caffeine: None    Patient lives at her home with her 11 year old grandson       Review of Systems   Constitutional: Positive for fatigue.   HENT: Negative.    Eyes: Negative.    Respiratory: Negative.    Cardiovascular: Positive for chest pain.   Gastrointestinal: Positive for abdominal pain and nausea.        BLACK STOOL; PATIENT HAS BEEN TAKING PEPTO.    Endocrine: Negative.    Genitourinary: Negative.    Musculoskeletal: Negative.    Skin: Negative.    Allergic/Immunologic: Negative.    Neurological: Positive for dizziness, weakness and light-headedness.   Hematological: Negative.    Psychiatric/Behavioral: Negative.          There were no vitals filed for this visit.    Physical Exam  General Appearance:  Vitals as above. no acute distress  Head/face:  Normocephalic, atraumatic  Eyes:   EOMI, no conjunctivitis or icterus   Nose/Sinuses:  Nares patent bilaterally without discharge or lesions  Mouth/Throat:  Posterior pharynx is pink without drainage or exudates;  dentition is in good condition and repair  Neck:  trachea is midline, no thyromegaly  Neurologic:  Alert; no focal deficits; age appropriate behavior and speech  Psychiatric: mood and affect are congruent  Skin: no rash or cyanosis.  Abdomen: soft, nt, no rebound    Assessment/Plan  1.) Chronic nausea  2.) Atypical chest pain  I will  have her stop nexium due to minimal relief if any. Due to atypical chest pain she has seen cardiology. She can use pepcid prn.  Will get her scheduled for pH impedance testing + high resolution manometry.  I would also focus on possible medicine induced nausea.  Future consideration: GES    rtc 3 months.      Nicola Mukherjee MD  2/11/2020

## 2020-02-13 ENCOUNTER — TELEPHONE (OUTPATIENT)
Dept: CARDIOLOGY | Facility: CLINIC | Age: 60
End: 2020-02-13

## 2020-02-13 DIAGNOSIS — R00.2 PALPITATIONS: Primary | ICD-10-CM

## 2020-02-13 RX ORDER — DILTIAZEM HYDROCHLORIDE 180 MG/1
180 CAPSULE, COATED, EXTENDED RELEASE ORAL DAILY
Qty: 90 CAPSULE | Refills: 1 | Status: SHIPPED | OUTPATIENT
Start: 2020-02-13 | End: 2020-03-23

## 2020-02-13 NOTE — TELEPHONE ENCOUNTER
Would discontinue verapamil SR and try diltiazem  mg daily and have her assess her blood pressure and pulse 1-2 times daily and update us in 1 week.  Unusual and perplexing reaction.    Thanks,  KTS

## 2020-02-13 NOTE — TELEPHONE ENCOUNTER
Pt called and stated that she thinks amlodipine 2.5 mg BID was making her sick. Pt states she would wake up jittery and nauseated. Pt stopped taking evening dose and her BP increased, but she felt better. She restarted the evening dose and was sick again.    [facility] ED changed amlodipine to verapamil 12/30/19. Pt went to ED with fatigue, nausea.    Pt states that she is still jittery, nauseous, fatigued, weak on verapamil.    Pt denies SOB, chest pain, swelling.    Pt currently taking:  Verapamil 180 mg daily    Pt has been seeing Dr Mukherjee for gastroenterology and pt states Dr Mukherjee told her that it is a possibility that BP medication could be making her sick.    BP has been running around 130s/80s.      Pt wants to know if she can try a different BP medication.    Please advise.

## 2020-02-13 NOTE — TELEPHONE ENCOUNTER
Called pt and gave KTS recommendations above.     Pt verbalizes understanding and agreeable to plan.

## 2020-02-13 NOTE — TELEPHONE ENCOUNTER
Pt called, LVM, stating medication is making her sick.    Called pt back, no answer, LVM for pt to return call.

## 2020-02-20 ENCOUNTER — OUTSIDE FACILITY SERVICE (OUTPATIENT)
Dept: GASTROENTEROLOGY | Facility: CLINIC | Age: 60
End: 2020-02-20

## 2020-02-20 ENCOUNTER — LAB REQUISITION (OUTPATIENT)
Dept: LAB | Facility: HOSPITAL | Age: 60
End: 2020-02-20

## 2020-02-20 DIAGNOSIS — R10.13 EPIGASTRIC PAIN: ICD-10-CM

## 2020-02-20 DIAGNOSIS — R11.0 NAUSEA: ICD-10-CM

## 2020-02-20 DIAGNOSIS — Z12.11 ENCOUNTER FOR SCREENING FOR MALIGNANT NEOPLASM OF COLON: ICD-10-CM

## 2020-02-20 PROCEDURE — 45380 COLONOSCOPY AND BIOPSY: CPT | Performed by: INTERNAL MEDICINE

## 2020-02-20 PROCEDURE — 88305 TISSUE EXAM BY PATHOLOGIST: CPT | Performed by: INTERNAL MEDICINE

## 2020-02-20 PROCEDURE — 45385 COLONOSCOPY W/LESION REMOVAL: CPT | Performed by: INTERNAL MEDICINE

## 2020-02-21 ENCOUNTER — TELEPHONE (OUTPATIENT)
Dept: GASTROENTEROLOGY | Facility: CLINIC | Age: 60
End: 2020-02-21

## 2020-02-21 NOTE — TELEPHONE ENCOUNTER
----- Message from Nicola Mukherjee MD sent at 2/21/2020  1:17 PM EST -----  I am happy to report that your biopsies show benign tissue. Recommend repeat colonoscopy in 10 years. I do recommend addressing your blood pressure medication with pcp and stopping pepcid prior to next appointment with me.

## 2020-02-25 ENCOUNTER — TELEPHONE (OUTPATIENT)
Dept: GASTROENTEROLOGY | Facility: CLINIC | Age: 60
End: 2020-02-25

## 2020-02-25 NOTE — TELEPHONE ENCOUNTER
PT CALLED WANTING TO KNOW IF SHE HAD A F/U WITH DR. BLOUNT. STATES SHE IS STILL HAVING ABDOMINAL PAIN . ASKED IF SHE WAS KEEPING HER APPT WITH DR. HERRON, PT WAS UNAWARE SHE HAD AN APPT. I ADVISED PT OF DR. HERRON PHONE NUMBER AND SHE WILL CONTACT THEM IN REGARDS TO HER APPT ON 2/27/2020.

## 2020-02-26 ENCOUNTER — TELEPHONE (OUTPATIENT)
Dept: GASTROENTEROLOGY | Facility: CLINIC | Age: 60
End: 2020-02-26

## 2020-02-27 ENCOUNTER — HOSPITAL ENCOUNTER (OUTPATIENT)
Facility: HOSPITAL | Age: 60
Setting detail: HOSPITAL OUTPATIENT SURGERY
Discharge: HOME | End: 2020-02-27
Attending: SURGERY | Admitting: SURGERY

## 2020-02-27 DIAGNOSIS — A04.8 H. PYLORI INFECTION: Primary | ICD-10-CM

## 2020-02-27 PROCEDURE — 91010 ESOPHAGUS MOTILITY STUDY: CPT | Performed by: SURGERY

## 2020-02-27 NOTE — TELEPHONE ENCOUNTER
I called pt to let her know the test would be ordered and she will receive a call to get that schedule. Pt voiced understanding with no further questions or concerns at this time.

## 2020-03-02 ENCOUNTER — TELEPHONE (OUTPATIENT)
Dept: GASTROENTEROLOGY | Facility: CLINIC | Age: 60
End: 2020-03-02

## 2020-03-02 DIAGNOSIS — R10.9 ABDOMINAL PAIN, UNSPECIFIED ABDOMINAL LOCATION: Primary | ICD-10-CM

## 2020-03-02 NOTE — TELEPHONE ENCOUNTER
"I understand this is a \"no fun\" test. I would recommend it.  However, she can seek a second opinion at  and they may take the diagnostic course down a different avenue.  Thank you.  "

## 2020-03-06 ENCOUNTER — LAB (OUTPATIENT)
Dept: LAB | Facility: HOSPITAL | Age: 60
End: 2020-03-06

## 2020-03-06 ENCOUNTER — TELEPHONE (OUTPATIENT)
Dept: GASTROENTEROLOGY | Facility: CLINIC | Age: 60
End: 2020-03-06

## 2020-03-06 DIAGNOSIS — R10.9 ABDOMINAL PAIN, UNSPECIFIED ABDOMINAL LOCATION: ICD-10-CM

## 2020-03-06 PROCEDURE — 83013 H PYLORI (C-13) BREATH: CPT

## 2020-03-06 NOTE — TELEPHONE ENCOUNTER
Patient called she stated that she called [facility] to schedule her H pylori breath test. I explained to her that she doesn't have to schedule an appointment; she can just walk in any [facility] lab to get H pylori breath test. Patient will also try to  get PCP to put in referral for 2nd opinion at .

## 2020-03-09 ENCOUNTER — APPOINTMENT (OUTPATIENT)
Dept: GENERAL RADIOLOGY | Facility: HOSPITAL | Age: 60
End: 2020-03-09

## 2020-03-09 ENCOUNTER — HOSPITAL ENCOUNTER (EMERGENCY)
Facility: HOSPITAL | Age: 60
Discharge: HOME | End: 2020-03-10
Attending: EMERGENCY MEDICINE | Admitting: EMERGENCY MEDICINE

## 2020-03-09 ENCOUNTER — APPOINTMENT (OUTPATIENT)
Dept: CT IMAGING | Facility: HOSPITAL | Age: 60
End: 2020-03-09

## 2020-03-09 VITALS
BODY MASS INDEX: 24.32 KG/M2 | HEART RATE: 71 BPM | OXYGEN SATURATION: 98 % | SYSTOLIC BLOOD PRESSURE: 157 MMHG | RESPIRATION RATE: 18 BRPM | WEIGHT: 145.94 LBS | DIASTOLIC BLOOD PRESSURE: 83 MMHG | TEMPERATURE: 98.5 F | HEIGHT: 65 IN

## 2020-03-09 DIAGNOSIS — R11.0 NAUSEA: ICD-10-CM

## 2020-03-09 DIAGNOSIS — Z87.19 HISTORY OF HIATAL HERNIA: ICD-10-CM

## 2020-03-09 DIAGNOSIS — Z86.79 HISTORY OF HYPERTENSION: ICD-10-CM

## 2020-03-09 DIAGNOSIS — R63.4 WEIGHT LOSS: ICD-10-CM

## 2020-03-09 DIAGNOSIS — R07.89 ATYPICAL CHEST PAIN: ICD-10-CM

## 2020-03-09 DIAGNOSIS — R10.13 EPIGASTRIC DISCOMFORT: Primary | ICD-10-CM

## 2020-03-09 DIAGNOSIS — R14.2 BELCHING: ICD-10-CM

## 2020-03-09 LAB
ALBUMIN SERPL-MCNC: 4.5 G/DL (ref 3.5–5.2)
ALBUMIN/GLOB SERPL: 1.6 G/DL
ALP SERPL-CCNC: 63 U/L (ref 39–117)
ALT SERPL W P-5'-P-CCNC: 7 U/L (ref 1–33)
ANION GAP SERPL CALCULATED.3IONS-SCNC: 11 MMOL/L (ref 5–15)
AST SERPL-CCNC: 10 U/L (ref 1–32)
BACTERIA UR QL AUTO: ABNORMAL /HPF
BASOPHILS # BLD AUTO: 0.03 10*3/MM3 (ref 0–0.2)
BASOPHILS NFR BLD AUTO: 0.5 % (ref 0–1.5)
BILIRUB SERPL-MCNC: 0.3 MG/DL (ref 0.2–1.2)
BILIRUB UR QL STRIP: NEGATIVE
BUN BLD-MCNC: 13 MG/DL (ref 8–23)
BUN/CREAT SERPL: 17.6 (ref 7–25)
CALCIUM SPEC-SCNC: 10.1 MG/DL (ref 8.6–10.5)
CHLORIDE SERPL-SCNC: 101 MMOL/L (ref 98–107)
CLARITY UR: CLEAR
CO2 SERPL-SCNC: 30 MMOL/L (ref 22–29)
COLOR UR: YELLOW
CREAT BLD-MCNC: 0.74 MG/DL (ref 0.57–1)
DEPRECATED RDW RBC AUTO: 39.6 FL (ref 37–54)
EOSINOPHIL # BLD AUTO: 0.06 10*3/MM3 (ref 0–0.4)
EOSINOPHIL NFR BLD AUTO: 0.9 % (ref 0.3–6.2)
ERYTHROCYTE [DISTWIDTH] IN BLOOD BY AUTOMATED COUNT: 12.2 % (ref 12.3–15.4)
GFR SERPL CREATININE-BSD FRML MDRD: 97 ML/MIN/1.73
GLOBULIN UR ELPH-MCNC: 2.9 GM/DL
GLUCOSE BLD-MCNC: 101 MG/DL (ref 65–99)
GLUCOSE UR STRIP-MCNC: NEGATIVE MG/DL
HCT VFR BLD AUTO: 40.7 % (ref 34–46.6)
HGB BLD-MCNC: 12.6 G/DL (ref 12–15.9)
HGB UR QL STRIP.AUTO: NEGATIVE
HOLD SPECIMEN: NORMAL
HOLD SPECIMEN: NORMAL
HYALINE CASTS UR QL AUTO: ABNORMAL /LPF
IMM GRANULOCYTES # BLD AUTO: 0.02 10*3/MM3 (ref 0–0.05)
IMM GRANULOCYTES NFR BLD AUTO: 0.3 % (ref 0–0.5)
KETONES UR QL STRIP: NEGATIVE
LEUKOCYTE ESTERASE UR QL STRIP.AUTO: ABNORMAL
LIPASE SERPL-CCNC: 59 U/L (ref 13–60)
LYMPHOCYTES # BLD AUTO: 2.19 10*3/MM3 (ref 0.7–3.1)
LYMPHOCYTES NFR BLD AUTO: 33.8 % (ref 19.6–45.3)
MCH RBC QN AUTO: 27.3 PG (ref 26.6–33)
MCHC RBC AUTO-ENTMCNC: 31 G/DL (ref 31.5–35.7)
MCV RBC AUTO: 88.1 FL (ref 79–97)
MONOCYTES # BLD AUTO: 0.33 10*3/MM3 (ref 0.1–0.9)
MONOCYTES NFR BLD AUTO: 5.1 % (ref 5–12)
NEUTROPHILS # BLD AUTO: 3.84 10*3/MM3 (ref 1.7–7)
NEUTROPHILS NFR BLD AUTO: 59.4 % (ref 42.7–76)
NITRITE UR QL STRIP: NEGATIVE
NRBC BLD AUTO-RTO: 0 /100 WBC (ref 0–0.2)
PH UR STRIP.AUTO: 7 [PH] (ref 5–8)
PLATELET # BLD AUTO: 205 10*3/MM3 (ref 140–450)
PMV BLD AUTO: 10.5 FL (ref 6–12)
POTASSIUM BLD-SCNC: 3.4 MMOL/L (ref 3.5–5.2)
PROT SERPL-MCNC: 7.4 G/DL (ref 6–8.5)
PROT UR QL STRIP: NEGATIVE
RBC # BLD AUTO: 4.62 10*6/MM3 (ref 3.77–5.28)
RBC # UR: ABNORMAL /HPF
REF LAB TEST METHOD: ABNORMAL
SODIUM BLD-SCNC: 142 MMOL/L (ref 136–145)
SP GR UR STRIP: 1.01 (ref 1–1.03)
SQUAMOUS #/AREA URNS HPF: ABNORMAL /HPF
TROPONIN T SERPL-MCNC: <0.01 NG/ML (ref 0–0.03)
TROPONIN T SERPL-MCNC: <0.01 NG/ML (ref 0–0.03)
UREA BREATH TEST QL: NEGATIVE
UROBILINOGEN UR QL STRIP: ABNORMAL
WBC NRBC COR # BLD: 6.47 10*3/MM3 (ref 3.4–10.8)
WBC UR QL AUTO: ABNORMAL /HPF
WHOLE BLOOD HOLD SPECIMEN: NORMAL
WHOLE BLOOD HOLD SPECIMEN: NORMAL

## 2020-03-09 PROCEDURE — 74178 CT ABD&PLV WO CNTR FLWD CNTR: CPT

## 2020-03-09 PROCEDURE — 93005 ELECTROCARDIOGRAM TRACING: CPT

## 2020-03-09 PROCEDURE — 84484 ASSAY OF TROPONIN QUANT: CPT | Performed by: PHYSICIAN ASSISTANT

## 2020-03-09 PROCEDURE — 96374 THER/PROPH/DIAG INJ IV PUSH: CPT

## 2020-03-09 PROCEDURE — 93005 ELECTROCARDIOGRAM TRACING: CPT | Performed by: PHYSICIAN ASSISTANT

## 2020-03-09 PROCEDURE — 85025 COMPLETE CBC W/AUTO DIFF WBC: CPT

## 2020-03-09 PROCEDURE — 83690 ASSAY OF LIPASE: CPT | Performed by: EMERGENCY MEDICINE

## 2020-03-09 PROCEDURE — 25010000002 METOCLOPRAMIDE PER 10 MG: Performed by: PHYSICIAN ASSISTANT

## 2020-03-09 PROCEDURE — 81001 URINALYSIS AUTO W/SCOPE: CPT | Performed by: EMERGENCY MEDICINE

## 2020-03-09 PROCEDURE — 99283 EMERGENCY DEPT VISIT LOW MDM: CPT

## 2020-03-09 PROCEDURE — 0 DIATRIZOATE MEGLUMINE & SODIUM PER 1 ML: Performed by: PHYSICIAN ASSISTANT

## 2020-03-09 PROCEDURE — 84484 ASSAY OF TROPONIN QUANT: CPT | Performed by: EMERGENCY MEDICINE

## 2020-03-09 PROCEDURE — 71045 X-RAY EXAM CHEST 1 VIEW: CPT

## 2020-03-09 PROCEDURE — 0 IOPAMIDOL PER 1 ML: Performed by: EMERGENCY MEDICINE

## 2020-03-09 PROCEDURE — 80053 COMPREHEN METABOLIC PANEL: CPT | Performed by: EMERGENCY MEDICINE

## 2020-03-09 PROCEDURE — 93005 ELECTROCARDIOGRAM TRACING: CPT | Performed by: EMERGENCY MEDICINE

## 2020-03-09 RX ORDER — METOCLOPRAMIDE HYDROCHLORIDE 5 MG/ML
10 INJECTION INTRAMUSCULAR; INTRAVENOUS ONCE
Status: COMPLETED | OUTPATIENT
Start: 2020-03-09 | End: 2020-03-09

## 2020-03-09 RX ORDER — SODIUM CHLORIDE 0.9 % (FLUSH) 0.9 %
10 SYRINGE (ML) INJECTION AS NEEDED
Status: DISCONTINUED | OUTPATIENT
Start: 2020-03-09 | End: 2020-03-10 | Stop reason: HOSPADM

## 2020-03-09 RX ADMIN — METOCLOPRAMIDE 10 MG: 5 INJECTION, SOLUTION INTRAMUSCULAR; INTRAVENOUS at 20:11

## 2020-03-09 RX ADMIN — IOPAMIDOL 100 ML: 755 INJECTION, SOLUTION INTRAVENOUS at 23:04

## 2020-03-09 RX ADMIN — SODIUM CHLORIDE 1000 ML: 9 INJECTION, SOLUTION INTRAVENOUS at 20:12

## 2020-03-09 RX ADMIN — DIATRIZOATE MEGLUMINE AND DIATRIZOATE SODIUM 15 ML: 660; 100 LIQUID ORAL; RECTAL at 20:11

## 2020-03-09 NOTE — TELEPHONE ENCOUNTER
Called and scheduled hydrogen breath test at [facility]. Patient is scheduled for March 18. She will need to arrive at 6am. The address is [address] [place] [address] in [place]. She will need to be NPO after midnight but can have water. She will need to be smoke free including second hand smoke for one hour prior, and she will need to be exercise free for one hour prior.     Called patient and m for her to call back so I could give her this information.

## 2020-03-09 NOTE — ED PROVIDER NOTES
"Subjective   Nancy Albert is a 59 y.o.female who presents to the emergency department with complaints of abdominal pain. The patient states her pain is located in the upper abdomen. The pain worsens after eating and often wakes her up at night. She has appreciated substernal chest pain and a sore throat which she describes as \"esophagus pain\" but she denies trouble swallowing. She endorses nausea, generalized weakness and unexpected weight loss. She has lost 30-40lbs over the course of a few months with current syptoms. Her pain and symptoms have been present since around August 2019 and this is her fourth visit to an emergency room. She reports that she was started on Carafate after a visit to  emergency room in the past but it made her develop constipation and dizziness. She also took multiple PPIs from August 2019 but stopped taking it in February after lack of results. She had a colonoscopy on 2/20/20 which was normal aside from one sigmoid polyp removal. She had an endoscopy in 8/12/19 with her gastroenterologist Dr. Mukherjee which showed mild gastropathy. She was unable to complete an esophogeal motility test on 3/2/20 and she scheduled a hydrogen breath test on 3/18/20 at [facility]. Her recent breath on 3/6/20 for H. Pylori was negative. She had a recent CT of abdomen and pelvis without contrast on 2/6/20 which was negative. She had an ultrasound on 1/7/20 which showed a non-obstructing kidney stone and a cyst on the liver. She has a history of hypertension.  Patient is very frustrated with recurrent GI symptoms and no answers to date she also states that Dr. Mukherjee recently discontinued her PPI in February because patient says that it really was not helping any patient also reports that she was told she had a hiatal hernia.  She denies any daily aspirin use or NSAID use.  She only takes Tylenol as needed. There are no other acute complaints at this time.      History provided by:  Patient  Abdominal Pain "   Pain severity:  Moderate  Onset quality:  Sudden  Timing:  Constant  Progression:  Unchanged  Chronicity:  New  Associated symptoms: chest pain (central chest), nausea and sore throat    Associated symptoms: no chills, no constipation, no diarrhea, no fever and no vomiting        Review of Systems   Constitutional: Positive for appetite change and unexpected weight change (30-40lbs). Negative for chills and fever.   HENT: Positive for sore throat. Negative for trouble swallowing.    Cardiovascular: Positive for chest pain (central chest).   Gastrointestinal: Positive for abdominal pain (Epigastric) and nausea. Negative for blood in stool, constipation, diarrhea and vomiting.        Frequent belching and epigastric fullness.   Genitourinary: Negative.    Neurological: Positive for weakness (Generalized).   All other systems reviewed and are negative.      Past Medical History:   Diagnosis Date   • Arthritis 2019   • GERD (gastroesophageal reflux disease)    • Heart murmur    • Hypertension        Allergies   Allergen Reactions   • Beta Adrenergic Blockers GI Intolerance       Past Surgical History:   Procedure Laterality Date   • HYSTERECTOMY  07/15/2008   • OOPHORECTOMY Left 07/15/2008       Family History   Problem Relation Age of Onset   • Cancer Mother    • Arthritis Father    • No Known Problems Sister    • No Known Problems Brother    • Pancreatic cancer Maternal Grandmother    • No Known Problems Maternal Grandfather    • No Known Problems Paternal Grandmother    • No Known Problems Paternal Grandfather    • No Known Problems Daughter    • Breast cancer Neg Hx    • Ovarian cancer Neg Hx        Social History     Socioeconomic History   • Marital status: Single     Spouse name: Not on file   • Number of children: Not on file   • Years of education: Not on file   • Highest education level: Not on file   Occupational History   • Occupation: Retired   Tobacco Use   • Smoking status: Never Smoker   • Smokeless  tobacco: Never Used   Substance and Sexual Activity   • Alcohol use: No   • Drug use: No   • Sexual activity: Defer   Social History Narrative    Caffeine: None    Patient lives at her home with her 11 year old grandson         Objective   Physical Exam   Constitutional: She is oriented to person, place, and time. She appears well-developed and well-nourished.   HENT:   Head: Normocephalic and atraumatic.   Nose: Nose normal.   Eyes: Conjunctivae are normal. No scleral icterus.   Neck: Normal range of motion. Neck supple.   Cardiovascular: Normal rate, regular rhythm and normal heart sounds.   Pulmonary/Chest: Effort normal and breath sounds normal. No respiratory distress.   Abdominal: Soft. Bowel sounds are normal. There is tenderness in the epigastric area. There is no CVA tenderness (or flank).   Moderate epigastric tenderness to palpation. No left upper quadrant tenderness to palpation. Increased belching on exam.   Musculoskeletal: Normal range of motion.   Neurological: She is alert and oriented to person, place, and time.   Skin: Skin is warm and dry.   Psychiatric: She has a normal mood and affect. Her behavior is normal.   Nursing note and vitals reviewed.      Procedures         ED Course  ED Course as of Mar 10 0401   Mon Mar 09, 2020   1948 EKG shows normal sinus rhythm.  There is no acute ST-T wave changes consistent with ischemia.  Chest x-ray shows no acute cardiopulmonary process.  CBC was completely within normal limits.  Chemistries were also normal, including normal LFTs.  Urinalysis revealed 6-12 white blood cells and moderate leukocytes, negative bacteria and negative nitrite.  Patient denies any dysuria, urgency, or frequency.  I discussed the case in detail with Dr. Corrales and reviewed all of patient's previous GI notes and work-up through epic.  She has a hydrogen breath test scheduled at [facility] 3//18/20.  She also just had a recent negative H. Pylori breath test.  We will order CT of the  abdomen and pelvis with and without contrast and oral contrast, pancreatic protocol.    [FC]   2349 Repeat troponin is negative at less than 0.012.  CT scan of the abdomen and pelvis with oral and IV contrast, pancreatic protocol revealed air and fluid distention of the stomach, nonspecific.  No convincing evidence of gastritis.  Mild prominence of small bowel loops in left midabdomen which may reflect a localized small bowel ileus or mild localized enteritis.  Multiple cysts throughout the liver and in the kidneys.  Normal appendix.  No other acute findings.  I will go update the patient on all results.  I actually updated her about the hydrogen breath test in [place] and she was not aware of this.  I advised her to call Dr. Mukherjee's office in the morning to discuss those arrangements.  I also updated her on H. pylori test that was negative.  Patient's symptoms are becoming chronic and she is established with GI, Dr. Mukherjee.  Recommend her to continue to avoid greasy, spicy, or fatty foods.  Keep head of bed elevated.  She reports that Dr. Mukherjee discontinued her PPI and she is unable to tolerate Carafate.  I will prescribe some Reglan on discharge to be taken 3 times a day prior to meals.    [FC]   2352 After reviewing daily medications, patient is actually prescribed Reglan 4 times a day.  No other new medications will be given on discharge.    [FC]   2353 We also will refer patient to the chest pain clinic due to atypical chest pain with history of hypertension for recheck.    [FC]      ED Course User Index  [FC] Sonal Knox, ETIENNE     Recent Results (from the past 24 hour(s))   Comprehensive Metabolic Panel    Collection Time: 03/09/20  6:44 PM   Result Value Ref Range    Glucose 101 (H) 65 - 99 mg/dL    BUN 13 8 - 23 mg/dL    Creatinine 0.74 0.57 - 1.00 mg/dL    Sodium 142 136 - 145 mmol/L    Potassium 3.4 (L) 3.5 - 5.2 mmol/L    Chloride 101 98 - 107 mmol/L    CO2 30.0 (H) 22.0 - 29.0 mmol/L    Calcium  10.1 8.6 - 10.5 mg/dL    Total Protein 7.4 6.0 - 8.5 g/dL    Albumin 4.50 3.50 - 5.20 g/dL    ALT (SGPT) 7 1 - 33 U/L    AST (SGOT) 10 1 - 32 U/L    Alkaline Phosphatase 63 39 - 117 U/L    Total Bilirubin 0.3 0.2 - 1.2 mg/dL    eGFR  African Amer 97 >60 mL/min/1.73    Globulin 2.9 gm/dL    A/G Ratio 1.6 g/dL    BUN/Creatinine Ratio 17.6 7.0 - 25.0    Anion Gap 11.0 5.0 - 15.0 mmol/L   Lipase    Collection Time: 03/09/20  6:44 PM   Result Value Ref Range    Lipase 59 13 - 60 U/L   Troponin    Collection Time: 03/09/20  6:44 PM   Result Value Ref Range    Troponin T <0.010 0.000 - 0.030 ng/mL   Light Blue Top    Collection Time: 03/09/20  6:44 PM   Result Value Ref Range    Extra Tube hold for add-on    Green Top (Gel)    Collection Time: 03/09/20  6:44 PM   Result Value Ref Range    Extra Tube Hold for add-ons.    Lavender Top    Collection Time: 03/09/20  6:44 PM   Result Value Ref Range    Extra Tube hold for add-on    Gold Top - SST    Collection Time: 03/09/20  6:44 PM   Result Value Ref Range    Extra Tube Hold for add-ons.    CBC Auto Differential    Collection Time: 03/09/20  6:44 PM   Result Value Ref Range    WBC 6.47 3.40 - 10.80 10*3/mm3    RBC 4.62 3.77 - 5.28 10*6/mm3    Hemoglobin 12.6 12.0 - 15.9 g/dL    Hematocrit 40.7 34.0 - 46.6 %    MCV 88.1 79.0 - 97.0 fL    MCH 27.3 26.6 - 33.0 pg    MCHC 31.0 (L) 31.5 - 35.7 g/dL    RDW 12.2 (L) 12.3 - 15.4 %    RDW-SD 39.6 37.0 - 54.0 fl    MPV 10.5 6.0 - 12.0 fL    Platelets 205 140 - 450 10*3/mm3    Neutrophil % 59.4 42.7 - 76.0 %    Lymphocyte % 33.8 19.6 - 45.3 %    Monocyte % 5.1 5.0 - 12.0 %    Eosinophil % 0.9 0.3 - 6.2 %    Basophil % 0.5 0.0 - 1.5 %    Immature Grans % 0.3 0.0 - 0.5 %    Neutrophils, Absolute 3.84 1.70 - 7.00 10*3/mm3    Lymphocytes, Absolute 2.19 0.70 - 3.10 10*3/mm3    Monocytes, Absolute 0.33 0.10 - 0.90 10*3/mm3    Eosinophils, Absolute 0.06 0.00 - 0.40 10*3/mm3    Basophils, Absolute 0.03 0.00 - 0.20 10*3/mm3    Immature Grans,  Absolute 0.02 0.00 - 0.05 10*3/mm3    nRBC 0.0 0.0 - 0.2 /100 WBC   Urinalysis With Microscopic If Indicated (No Culture) - Urine, Clean Catch    Collection Time: 03/09/20  7:02 PM   Result Value Ref Range    Color, UA Yellow Yellow, Straw    Appearance, UA Clear Clear    pH, UA 7.0 5.0 - 8.0    Specific Gravity, UA 1.007 1.001 - 1.030    Glucose, UA Negative Negative    Ketones, UA Negative Negative    Bilirubin, UA Negative Negative    Blood, UA Negative Negative    Protein, UA Negative Negative    Leuk Esterase, UA Moderate (2+) (A) Negative    Nitrite, UA Negative Negative    Urobilinogen, UA 0.2 E.U./dL 0.2 - 1.0 E.U./dL   Urinalysis, Microscopic Only - Urine, Clean Catch    Collection Time: 03/09/20  7:02 PM   Result Value Ref Range    RBC, UA 0-2 None Seen, 0-2 /HPF    WBC, UA 6-12 (A) None Seen, 0-2 /HPF    Bacteria, UA None Seen None Seen, Trace /HPF    Squamous Epithelial Cells, UA 0-2 None Seen, 0-2 /HPF    Hyaline Casts, UA 0-6 0 - 6 /LPF    Methodology Automated Microscopy    Troponin    Collection Time: 03/09/20  9:45 PM   Result Value Ref Range    Troponin T <0.010 0.000 - 0.030 ng/mL     Note: In addition to lab results from this visit, the labs listed above may include labs taken at another facility or during a different encounter within the last 24 hours. Please correlate lab times with ED admission and discharge times for further clarification of the services performed during this visit.    CT Abdomen Pelvis With & Without Contrast   Final Result      1. Air and fluid distention of the stomach, nonspecific. No convincing evidence of gastritis.   2. Mild prominence of small bowel loops in left midabdomen may reflect a localized small bowel ileus or mild localized enteritis.   3. Multiplicity of cysts throughout the liver and in the kidneys.   4. Normal appendix.               Signer Name: Rajan Gutierrez MD    Signed: 3/9/2020 11:40 PM    Workstation Name: [facility]     [facility]      XR Chest 1 View   Final Result   No acute cardiopulmonary findings. No free air seen in the upper abdomen      Signer Name: Tamra Murphy MD    Signed: 3/9/2020 7:37 PM    Workstation Name: [email or web address]     [facility] [place]        Vitals:    03/09/20 1834 03/09/20 2200   BP: 164/93 157/83   Pulse: 73 71   Resp: 18    Temp: 98.5 °F (36.9 °C)    SpO2: 96% 98%   Weight: 66.2 kg (145 lb 15.1 oz)    Height: 165.1 cm (65\")      Medications   diatrizoate meglumine-sodium (GASTROGRAFIN) 66-10 % solution 15 mL (15 mL Oral Given 3/9/20 2011)   sodium chloride 0.9 % bolus 1,000 mL (0 mL Intravenous Stopped 3/9/20 2126)   metoclopramide (REGLAN) injection 10 mg (10 mg Intravenous Given 3/9/20 2011)   iopamidol (ISOVUE-370) 76 % injection 100 mL (100 mL Intravenous Given 3/9/20 2304)     ECG/EMG Results (last 24 hours)     Procedure Component Value Units Date/Time    ECG 12 Lead [[id number]] Collected:  03/09/20 1841     Updated:  03/09/20 1842        ECG 12 Lead         ECG 12 Lead                                                    MDM    Final diagnoses:   Epigastric discomfort   Nausea   Weight loss   Belching   Atypical chest pain   History of hypertension   History of hiatal hernia       Documentation assistance provided by khalif Gould.  Information recorded by the cameronibperla was done at my direction and has been verified and validated by me.     Hugo Gould  03/1960       Sonal Knox PA-C  03/10/20 0401    "

## 2020-03-10 ENCOUNTER — TELEPHONE (OUTPATIENT)
Dept: GASTROENTEROLOGY | Facility: CLINIC | Age: 60
End: 2020-03-10

## 2020-03-10 NOTE — DISCHARGE INSTRUCTIONS
ER evaluation reveals normal cardiac work-up with 2 stable EKGs and 2 normal troponins.  All labs were within normal limits, as well.  CT scan of the abdomen and pelvis with oral and IV contrast, pancreatic protocol revealed no acute findings.  Patient did have cysts noted on the liver, which appeared benign.  Patient is established with Dr. Mukherjee, GI specialist.  She needs to continue with Reglan 4 times a day prior to meals.  Patient needs to contact Dr. Mukherjee's office in the morning to discuss plan of hydrogen breath test in [place] in the near future.  H. pylori breath test recently was negative.  Patient needs to avoid greasy, spicy, or fatty foods.  Due to history of hypertension and atypical chest pain, we will also refer patient to the chest pain clinic here at [facility].  Continue with all other current medical management.  Return if worsening symptoms.

## 2020-03-10 NOTE — TELEPHONE ENCOUNTER
Pt called [facility] requesting a referral for a second opinion. I returned her call to advise we do not send referrals for 2nd opinions. She will call [facility] to see what she needs in order to be seen.

## 2020-03-10 NOTE — TELEPHONE ENCOUNTER
PT CALLED [facility] REQUESTING A REFERRAL TO [facility]: DR. ORELLANA, PT IS WANTING A SECOND OPINION.

## 2020-03-11 NOTE — TELEPHONE ENCOUNTER
PT CALLED IN REGARDS TO RESULTS; RESULTS GIVEN   (I am happy to report that your h. Pylori breath test is negative for infection. Per Dr. Mukherjee).   PT VOICED UNDERSTANDING AND HAS NO FURTHER CONCERNS AT THIS TIME.

## 2020-03-13 ENCOUNTER — TELEPHONE (OUTPATIENT)
Dept: CARDIOLOGY | Facility: HOSPITAL | Age: 60
End: 2020-03-13

## 2020-03-13 NOTE — TELEPHONE ENCOUNTER
Patient was referred to [facility] Clinic by the [facility] ER. I have attempt to contact patient 3 times ( leaving vm for patient to return call to schedule). Letter was also sent asking patient to return call to schedule at [facility].

## 2020-03-17 ENCOUNTER — TELEPHONE (OUTPATIENT)
Dept: GASTROENTEROLOGY | Facility: CLINIC | Age: 60
End: 2020-03-17

## 2020-03-17 DIAGNOSIS — R10.84 GENERALIZED ABDOMINAL PAIN: Primary | ICD-10-CM

## 2020-03-17 DIAGNOSIS — R19.7 DIARRHEA, UNSPECIFIED TYPE: Primary | ICD-10-CM

## 2020-03-17 DIAGNOSIS — R19.7 DIARRHEA, UNSPECIFIED TYPE: ICD-10-CM

## 2020-03-17 DIAGNOSIS — R10.84 GENERALIZED ABDOMINAL PAIN: ICD-10-CM

## 2020-03-17 NOTE — TELEPHONE ENCOUNTER
Called and spoke to patient regarding Dr. Mukherjee stool test recommendation. Pt did advise [facility] did call her back and will follow through with the hydrogen breath test. She is still scheduled for tomorrow.

## 2020-03-17 NOTE — TELEPHONE ENCOUNTER
Tell her we can have her repeat a stool test called h.pylori.  Also inform her that all of our notes can be sent to her PCP who would be the one to fill out disability.  It appears [facility] breath test will not be running for at least 3 weeks.

## 2020-03-17 NOTE — TELEPHONE ENCOUNTER
Pt called M regarding H. Breath Test , [facility] called to cancel. She wants Dr. Mukherjee to call and state this is urgent. I returned pt call, [facility] is not seeing any patients that are not deemed necessary or an emergency. Pt is wanting to see if Dr. Mukherjee is able to perform or order other test since her hydrogen breath test has been canceled. States she has a disabled grandson that depends on her and currently is not feeling well enough to even care for him. I advised I would send a message to Dr. Mukherjee in this regards and would return her call once I received a reply from Dr. Mukherjee. Voiced understanding with no further questions or concerns at this time.

## 2020-03-19 ENCOUNTER — TELEPHONE (OUTPATIENT)
Dept: GASTROENTEROLOGY | Facility: CLINIC | Age: 60
End: 2020-03-19

## 2020-03-19 NOTE — TELEPHONE ENCOUNTER
Patient called back today stating that her symptoms are worsening. She complains of being weakness, having abdominal pain, nausea, shaky and heart palpations. I explained to her that Dr Mukherjee is out of today. I advised patient to call her PCP or go to the ER. Patient stated that her PCP told her there isn't anything he can do that's why he referred her to a GI doctor. Patient thanked me for talking to her and giving her some advice.

## 2020-03-20 ENCOUNTER — LAB (OUTPATIENT)
Dept: LAB | Facility: HOSPITAL | Age: 60
End: 2020-03-20

## 2020-03-20 DIAGNOSIS — R10.84 GENERALIZED ABDOMINAL PAIN: ICD-10-CM

## 2020-03-20 DIAGNOSIS — R19.7 DIARRHEA, UNSPECIFIED TYPE: ICD-10-CM

## 2020-03-23 ENCOUNTER — APPOINTMENT (OUTPATIENT)
Dept: CT IMAGING | Facility: HOSPITAL | Age: 60
End: 2020-03-23

## 2020-03-23 ENCOUNTER — APPOINTMENT (OUTPATIENT)
Dept: MAMMOGRAPHY | Facility: HOSPITAL | Age: 60
End: 2020-03-23

## 2020-03-23 ENCOUNTER — HOSPITAL ENCOUNTER (EMERGENCY)
Facility: HOSPITAL | Age: 60
Discharge: HOME | End: 2020-03-23
Attending: EMERGENCY MEDICINE | Admitting: EMERGENCY MEDICINE

## 2020-03-23 VITALS
HEIGHT: 65 IN | WEIGHT: 138.89 LBS | RESPIRATION RATE: 18 BRPM | SYSTOLIC BLOOD PRESSURE: 161 MMHG | BODY MASS INDEX: 23.14 KG/M2 | DIASTOLIC BLOOD PRESSURE: 84 MMHG | OXYGEN SATURATION: 100 % | TEMPERATURE: 98.6 F | HEART RATE: 66 BPM

## 2020-03-23 DIAGNOSIS — R10.84 ABDOMINAL PAIN, CHRONIC, GENERALIZED: Primary | ICD-10-CM

## 2020-03-23 DIAGNOSIS — G89.29 ABDOMINAL PAIN, CHRONIC, GENERALIZED: Primary | ICD-10-CM

## 2020-03-23 LAB
ALBUMIN SERPL-MCNC: 4.8 G/DL (ref 3.5–5.2)
ALBUMIN/GLOB SERPL: 1.5 G/DL
ALP SERPL-CCNC: 69 U/L (ref 39–117)
ALT SERPL W P-5'-P-CCNC: 7 U/L (ref 1–33)
ANION GAP SERPL CALCULATED.3IONS-SCNC: 13 MMOL/L (ref 5–15)
AST SERPL-CCNC: 12 U/L (ref 1–32)
BACTERIA UR QL AUTO: NORMAL /HPF
BASOPHILS # BLD AUTO: 0.02 10*3/MM3 (ref 0–0.2)
BASOPHILS NFR BLD AUTO: 0.4 % (ref 0–1.5)
BILIRUB SERPL-MCNC: 0.4 MG/DL (ref 0.2–1.2)
BILIRUB UR QL STRIP: NEGATIVE
BUN BLD-MCNC: 18 MG/DL (ref 8–23)
BUN/CREAT SERPL: 18.6 (ref 7–25)
CALCIUM SPEC-SCNC: 9.9 MG/DL (ref 8.6–10.5)
CHLORIDE SERPL-SCNC: 102 MMOL/L (ref 98–107)
CLARITY UR: CLEAR
CO2 SERPL-SCNC: 27 MMOL/L (ref 22–29)
COLOR UR: YELLOW
CREAT BLD-MCNC: 0.97 MG/DL (ref 0.57–1)
D-LACTATE SERPL-SCNC: 0.9 MMOL/L (ref 0.5–2)
DEPRECATED RDW RBC AUTO: 39.2 FL (ref 37–54)
EOSINOPHIL # BLD AUTO: 0.03 10*3/MM3 (ref 0–0.4)
EOSINOPHIL NFR BLD AUTO: 0.6 % (ref 0.3–6.2)
ERYTHROCYTE [DISTWIDTH] IN BLOOD BY AUTOMATED COUNT: 12.1 % (ref 12.3–15.4)
GFR SERPL CREATININE-BSD FRML MDRD: 71 ML/MIN/1.73
GLOBULIN UR ELPH-MCNC: 3.2 GM/DL
GLUCOSE BLD-MCNC: 92 MG/DL (ref 65–99)
GLUCOSE UR STRIP-MCNC: NEGATIVE MG/DL
HCT VFR BLD AUTO: 42.2 % (ref 34–46.6)
HGB BLD-MCNC: 13.2 G/DL (ref 12–15.9)
HGB UR QL STRIP.AUTO: ABNORMAL
HOLD SPECIMEN: NORMAL
HOLD SPECIMEN: NORMAL
HYALINE CASTS UR QL AUTO: NORMAL /LPF
IMM GRANULOCYTES # BLD AUTO: 0.01 10*3/MM3 (ref 0–0.05)
IMM GRANULOCYTES NFR BLD AUTO: 0.2 % (ref 0–0.5)
KETONES UR QL STRIP: NEGATIVE
LEUKOCYTE ESTERASE UR QL STRIP.AUTO: ABNORMAL
LIPASE SERPL-CCNC: 66 U/L (ref 13–60)
LYMPHOCYTES # BLD AUTO: 1.93 10*3/MM3 (ref 0.7–3.1)
LYMPHOCYTES NFR BLD AUTO: 38.4 % (ref 19.6–45.3)
MCH RBC QN AUTO: 27.6 PG (ref 26.6–33)
MCHC RBC AUTO-ENTMCNC: 31.3 G/DL (ref 31.5–35.7)
MCV RBC AUTO: 88.3 FL (ref 79–97)
MONOCYTES # BLD AUTO: 0.31 10*3/MM3 (ref 0.1–0.9)
MONOCYTES NFR BLD AUTO: 6.2 % (ref 5–12)
NEUTROPHILS # BLD AUTO: 2.73 10*3/MM3 (ref 1.7–7)
NEUTROPHILS NFR BLD AUTO: 54.2 % (ref 42.7–76)
NITRITE UR QL STRIP: NEGATIVE
NRBC BLD AUTO-RTO: 0 /100 WBC (ref 0–0.2)
PH UR STRIP.AUTO: 6 [PH] (ref 5–8)
PLATELET # BLD AUTO: 227 10*3/MM3 (ref 140–450)
PMV BLD AUTO: 11 FL (ref 6–12)
POTASSIUM BLD-SCNC: 3.6 MMOL/L (ref 3.5–5.2)
PROT SERPL-MCNC: 8 G/DL (ref 6–8.5)
PROT UR QL STRIP: NEGATIVE
RBC # BLD AUTO: 4.78 10*6/MM3 (ref 3.77–5.28)
RBC # UR: NORMAL /HPF
REF LAB TEST METHOD: NORMAL
SODIUM BLD-SCNC: 142 MMOL/L (ref 136–145)
SP GR UR STRIP: 1.02 (ref 1–1.03)
SQUAMOUS #/AREA URNS HPF: NORMAL /HPF
TROPONIN T SERPL-MCNC: <0.01 NG/ML (ref 0–0.03)
UROBILINOGEN UR QL STRIP: ABNORMAL
WBC NRBC COR # BLD: 5.03 10*3/MM3 (ref 3.4–10.8)
WBC UR QL AUTO: NORMAL /HPF
WHOLE BLOOD HOLD SPECIMEN: NORMAL
WHOLE BLOOD HOLD SPECIMEN: NORMAL

## 2020-03-23 PROCEDURE — 99284 EMERGENCY DEPT VISIT MOD MDM: CPT

## 2020-03-23 PROCEDURE — 80053 COMPREHEN METABOLIC PANEL: CPT | Performed by: EMERGENCY MEDICINE

## 2020-03-23 PROCEDURE — 83605 ASSAY OF LACTIC ACID: CPT | Performed by: EMERGENCY MEDICINE

## 2020-03-23 PROCEDURE — 84484 ASSAY OF TROPONIN QUANT: CPT | Performed by: PHYSICIAN ASSISTANT

## 2020-03-23 PROCEDURE — 93005 ELECTROCARDIOGRAM TRACING: CPT | Performed by: PHYSICIAN ASSISTANT

## 2020-03-23 PROCEDURE — 81001 URINALYSIS AUTO W/SCOPE: CPT | Performed by: EMERGENCY MEDICINE

## 2020-03-23 PROCEDURE — 85025 COMPLETE CBC W/AUTO DIFF WBC: CPT | Performed by: EMERGENCY MEDICINE

## 2020-03-23 PROCEDURE — 74176 CT ABD & PELVIS W/O CONTRAST: CPT

## 2020-03-23 PROCEDURE — 83690 ASSAY OF LIPASE: CPT | Performed by: EMERGENCY MEDICINE

## 2020-03-23 RX ORDER — SODIUM CHLORIDE 0.9 % (FLUSH) 0.9 %
10 SYRINGE (ML) INJECTION AS NEEDED
Status: DISCONTINUED | OUTPATIENT
Start: 2020-03-23 | End: 2020-03-23 | Stop reason: HOSPADM

## 2020-03-23 NOTE — TELEPHONE ENCOUNTER
PT CALLED TO ADVISE SHE IS HAVING WORSENING SYMPTOMS. I ADVISED PATIENT TO GO TO ED SINCE SHE HAS NOT IMPROVED. PT STATES SHE IS VERY WEAK, FATIGUED, ABDOMINAL PAIN AND DISTENTION, CONSTIPATION, UNABLE TO EAT A LOT BUT FORCES HERSELF SO SHE CAN TAKE HER MEDICATION, RAPID WEIGHT LOSS, DIZZINESS, HEADACHE, LIGHT HEADEDNESS. SHE STATES IS SHE JUST CAN NOT KEEP GOING LIKE THIS AND HOPES THAT SHE DOES NOT LEAVE THIS WORLD BECAUSE SHE DID NOT GET THE HELP SHE NEEDED. I ADVISED TO PLEASE GO TO ER FOR EVALUATION AND TREATMENT. SHE AGREED TO GO AND WILL UPDATE US AS NEEDED.

## 2020-03-25 ENCOUNTER — TELEPHONE (OUTPATIENT)
Dept: GASTROENTEROLOGY | Facility: CLINIC | Age: 60
End: 2020-03-25

## 2020-03-25 DIAGNOSIS — R19.7 DIARRHEA, UNSPECIFIED TYPE: ICD-10-CM

## 2020-03-25 DIAGNOSIS — R11.0 NAUSEA: ICD-10-CM

## 2020-03-25 DIAGNOSIS — R10.84 GENERALIZED ABDOMINAL PAIN: Primary | ICD-10-CM

## 2020-03-25 NOTE — TELEPHONE ENCOUNTER
PT CALLED REGARDING STOOL STUDY TEST. CALLED [facility] TO REQUEST RESULTS. SPOKE TO JULISSA, SHE ADVISED THAT  LOUIE FROM [facility] CALLED TODAY TO ADVISE THEY HAVE LOST THE STOOL SAMPLE.   PATIENT WAS NOT MADE AWARE OF THIS. I ADVISED I WOULD SPEAK TO MY PRACTICE MANAGER ON HOW TO HANDLE THIS.   SPOKE TO ANEESH, PRACTICE MANAGER, SHE ADVISED TO ORDER NEW H PYLORI ANTIGEN STOOL , GATHER LAB PHONE NUMBER AND THEN CALL PATIENT TO INFORM HER OF THIS ISSUE. ALSO, PLACE A SAFE REPORT.   RETURNED PT CALL IN REGARDS TO STOOL SAMPLE BEING LOST BY THE MAIN LAB. ADVISED PT OF NEW STOOL ORDER AND PROVIDED THE PATIENT WITH THE LAB PHONE NUMBER FOR HER TO CALL IF SHE HAS ANY QUESTIONS, SHE VOICED MAJOR CONCERN THAT HER SAMPLE WAS LOST AND SHE HAS HAD ONGOING ISSUES AND WAS WAITING ON THOSE RESULTS. I ADVISED PT THAT SHE CAN CALL THE LAB AND VOICE HER CONCERN. PT WILL GO GET ANOTHER COLLECTION KIT AND WILL GET DONE AS SOON AS POSSIBLE.   FOR THE RECORD, I HAVE ALSO PLACED A SAFE REPORT FOR THIS ISSUE.

## 2020-03-26 ENCOUNTER — LAB (OUTPATIENT)
Dept: LAB | Facility: HOSPITAL | Age: 60
End: 2020-03-26

## 2020-03-26 ENCOUNTER — TELEPHONE (OUTPATIENT)
Dept: GASTROENTEROLOGY | Facility: CLINIC | Age: 60
End: 2020-03-26

## 2020-03-26 DIAGNOSIS — R19.5 ABNORMAL STOOLS: Primary | ICD-10-CM

## 2020-03-26 DIAGNOSIS — R10.84 GENERALIZED ABDOMINAL PAIN: ICD-10-CM

## 2020-03-26 DIAGNOSIS — R19.7 DIARRHEA, UNSPECIFIED TYPE: ICD-10-CM

## 2020-03-26 DIAGNOSIS — R19.5 ABNORMAL STOOLS: ICD-10-CM

## 2020-03-26 DIAGNOSIS — R11.0 NAUSEA: ICD-10-CM

## 2020-03-26 LAB — C DIFF TOX GENS STL QL NAA+PROBE: NOT DETECTED

## 2020-03-26 PROCEDURE — 87338 HPYLORI STOOL AG IA: CPT

## 2020-03-26 PROCEDURE — 87493 C DIFF AMPLIFIED PROBE: CPT

## 2020-03-26 NOTE — TELEPHONE ENCOUNTER
PATIENT CALLED BACK AND LEFT MESSAGE FOR US TO REQUEST HYDROGEN BREATH TEST FROM [facility]. I WILL REQUEST HYDROGEN BREATH TEST TODAY.

## 2020-03-26 NOTE — TELEPHONE ENCOUNTER
I called patient back. Informed her that Roberta also put in a order to check for C Diff. She will check when she get to the lab to see if she needs a different container for C Diff test. Patient is going to turn in stool for H pylori specimen today.

## 2020-03-26 NOTE — TELEPHONE ENCOUNTER
Dr Lonny Granados is out until Monday. I spoke with patient this morning. She collected her samples to turn in to test for H pylori. Patient stated that she sees food in her stool and its a brownish orange color.   Patient is wanting to know if her stool can be tested for any other bacterial infections. Patient has an appointment with Dr Mukherjee on 4/1/2020. Thanks

## 2020-03-30 LAB — H PYLORI AG STL QL IA: NEGATIVE

## 2020-04-01 ENCOUNTER — OFFICE VISIT (OUTPATIENT)
Dept: GASTROENTEROLOGY | Facility: CLINIC | Age: 60
End: 2020-04-01

## 2020-04-01 ENCOUNTER — LAB (OUTPATIENT)
Dept: LAB | Facility: HOSPITAL | Age: 60
End: 2020-04-01

## 2020-04-01 VITALS — TEMPERATURE: 96.9 F | RESPIRATION RATE: 14 BRPM | HEIGHT: 65 IN | BODY MASS INDEX: 23.22 KG/M2 | WEIGHT: 139.4 LBS

## 2020-04-01 DIAGNOSIS — R11.0 NAUSEA: Primary | ICD-10-CM

## 2020-04-01 DIAGNOSIS — R00.2 PALPITATIONS: ICD-10-CM

## 2020-04-01 DIAGNOSIS — G89.29 CHRONIC ABDOMINAL PAIN: ICD-10-CM

## 2020-04-01 DIAGNOSIS — R10.9 CHRONIC ABDOMINAL PAIN: ICD-10-CM

## 2020-04-01 LAB
ANION GAP SERPL CALCULATED.3IONS-SCNC: 12.3 MMOL/L (ref 5–15)
BUN BLD-MCNC: 14 MG/DL (ref 8–23)
BUN/CREAT SERPL: 18.4 (ref 7–25)
CALCIUM SPEC-SCNC: 10 MG/DL (ref 8.6–10.5)
CHLORIDE SERPL-SCNC: 104 MMOL/L (ref 98–107)
CO2 SERPL-SCNC: 28.7 MMOL/L (ref 22–29)
CREAT BLD-MCNC: 0.76 MG/DL (ref 0.57–1)
GFR SERPL CREATININE-BSD FRML MDRD: 94 ML/MIN/1.73
GLUCOSE BLD-MCNC: 98 MG/DL (ref 65–99)
MAGNESIUM SERPL-MCNC: 2.3 MG/DL (ref 1.6–2.4)
POTASSIUM BLD-SCNC: 4.7 MMOL/L (ref 3.5–5.2)
SODIUM BLD-SCNC: 145 MMOL/L (ref 136–145)

## 2020-04-01 PROCEDURE — 36415 COLL VENOUS BLD VENIPUNCTURE: CPT

## 2020-04-01 PROCEDURE — 80048 BASIC METABOLIC PNL TOTAL CA: CPT

## 2020-04-01 PROCEDURE — 99215 OFFICE O/P EST HI 40 MIN: CPT | Performed by: INTERNAL MEDICINE

## 2020-04-01 PROCEDURE — 83735 ASSAY OF MAGNESIUM: CPT

## 2020-04-01 NOTE — PROGRESS NOTES
"PCP: Franklin Will MD    Chief Complaint   Patient presents with   • Weight Loss       History of Present Illness:   Nancy Albert is a 59 y.o. female who presents to GI clinic as a follow up for chronic nausea, abdominal pain, atypical chest pain, globus sensation, constipation and wt loss.  Nausea: worse. Has nausea throughout day.  Constipation: taking miralax. Last colonoscopy 2/2010 fair to good prep. On miralax she will have a bm, nonbloody  Globus: s/p egd, small hiatal hernia, feels like she has mucus in her throat  Atypical chest pain: improved after stopping diltiazem. She does think being back on verapamil has worsened abdominal pain  Recently in ed. She does not want to take levsin (previously attempted) and does not want to take reglan due to side effects.   Pain is epigastric and constant. She wakes up in the night and not sleeping due to \"gurgling\".   + light headedness, dry mouth, dizzy. No fever  + wt loss  Past Medical History:   Diagnosis Date   • Arthritis 2019   • GERD (gastroesophageal reflux disease)    • Heart murmur    • Hypertension        Past Surgical History:   Procedure Laterality Date   • HYSTERECTOMY  07/15/2008   • OOPHORECTOMY Left 07/15/2008         Current Outpatient Medications:   •  losartan-hydrochlorothiazide (HYZAAR) 100-25 MG per tablet, Take 1 tablet by mouth Daily., Disp: , Rfl:   •  Multiple Vitamins-Minerals (MULTIVITAMIN ADULT PO), Take  by mouth Daily., Disp: , Rfl:     Allergies   Allergen Reactions   • Beta Adrenergic Blockers GI Intolerance   • Verapamil Hcl Er GI Intolerance       Family History   Problem Relation Age of Onset   • Cancer Mother    • Arthritis Father    • No Known Problems Sister    • No Known Problems Brother    • Pancreatic cancer Maternal Grandmother    • No Known Problems Maternal Grandfather    • No Known Problems Paternal Grandmother    • No Known Problems Paternal Grandfather    • No Known Problems Daughter    • Breast cancer Neg Hx    • " Ovarian cancer Neg Hx        Social History     Socioeconomic History   • Marital status: Single     Spouse name: Not on file   • Number of children: Not on file   • Years of education: Not on file   • Highest education level: Not on file   Occupational History   • Occupation: Retired   Tobacco Use   • Smoking status: Never Smoker   • Smokeless tobacco: Never Used   Substance and Sexual Activity   • Alcohol use: No     Frequency: Never   • Drug use: No   • Sexual activity: Defer   Social History Narrative    Caffeine: None    Patient lives at her home with her 11 year old grandson       Review of Systems   Constitutional: Positive for activity change, appetite change, fatigue and unexpected weight change.   Gastrointestinal: Positive for abdominal pain and nausea.   Neurological: Positive for light-headedness and headaches.        Shaking, and feeling unstable         Vitals:    04/01/20 1039   Resp: 14   Temp: 96.9 °F (36.1 °C)       Physical Exam  General Appearance:  Vitals as above. no acute distress  Head/face:  Normocephalic, atraumatic  Eyes:   EOMI, no conjunctivitis or icterus   Nose/Sinuses:  Nares patent bilaterally without discharge or lesions  Mouth/Throat:  Posterior pharynx is pink without drainage or exudates;  dentition is in good condition and repair  Neck:  trachea is midline, no thyromegaly  Neurologic:  Alert; no focal deficits; age appropriate behavior and speech  Psychiatric: mood and affect are congruent  Skin: no rash or cyanosis.  Abdomen: nt, nd, no rebound    Assessment/Plan  1.) Chronic abdominal pain  2.) Chronic nausea  3.) atypical chest pain  4.) constipation, now on miralax  5.) Diffuse symptoms suggestive of somatization: paresthesias, dizziness  6.) Recent ed visit  7.) refusing certain diagnostic tests and medical therapies  8.) many intolerances to medications  Workup: egd/colonoscopy. Ct a/p x 2 (ed)  She is off nexium because she felt worse. ppis have not helped  She did not  like levsin or the way it made her feel  She does not want to try reglan due to side effect    Plan:   Have not been able to explain her symptoms with pathologic diagnosis. Would defer management to pcp for somatization. WIll complete GI workup by ruling out a small bowel lesion with a pill camera and also would assess a gastric emptying scan.  She does not want to try certain medications. For atypical chest pain, she refused HRM with pH impedance. This symptom is improved with Dr. Beckford's changing of ccb.  She is not sleeping well which is compounding her problem so fibromyalgia does remain a possibility.  45 minutes was devoted to the care of this patient including > 1/2 of the time face to face involving counseling and coordination of care.    rtc 4 months.      Nicola Mukherjee MD  4/1/2020

## 2020-04-03 ENCOUNTER — TELEPHONE (OUTPATIENT)
Dept: GASTROENTEROLOGY | Facility: CLINIC | Age: 60
End: 2020-04-03

## 2020-04-03 RX ORDER — METRONIDAZOLE 500 MG/1
500 TABLET ORAL 3 TIMES DAILY
Qty: 42 TABLET | Refills: 0 | Status: SHIPPED | OUTPATIENT
Start: 2020-04-03 | End: 2020-04-03 | Stop reason: SDUPTHER

## 2020-04-03 RX ORDER — METRONIDAZOLE 500 MG/1
500 TABLET ORAL 3 TIMES DAILY
Qty: 42 TABLET | Refills: 1 | Status: SHIPPED | OUTPATIENT
Start: 2020-04-03 | End: 2020-04-15 | Stop reason: ALTCHOICE

## 2020-04-03 RX ORDER — METRONIDAZOLE 500 MG/1
500 TABLET ORAL 3 TIMES DAILY
Qty: 42 TABLET | Refills: 0 | Status: CANCELLED | OUTPATIENT
Start: 2020-04-03 | End: 2020-04-17

## 2020-04-03 NOTE — TELEPHONE ENCOUNTER
Got the message. Please call patient and inform her  To take flagyl which is an antibiotic that can help sibo    Flagyl  500 mg po tid  Qs 14 days  1 refill    Thank you

## 2020-04-03 NOTE — TELEPHONE ENCOUNTER
Shannon from [facility] called stating that patient's Hydrogen Breath results showed Positive for bacteria over growth

## 2020-04-06 ENCOUNTER — TELEPHONE (OUTPATIENT)
Dept: GASTROENTEROLOGY | Facility: CLINIC | Age: 60
End: 2020-04-06

## 2020-04-06 NOTE — TELEPHONE ENCOUNTER
Dr Mukherjee,  I called Shannon back at [facility]. Patient Hydrogen breath test- positive for bacteria overgrowth. Thanks

## 2020-04-07 NOTE — TELEPHONE ENCOUNTER
Dr Mukherjee,  I spoke with patient today. Patient stated that her stomach isn't bubbly like before, her stomach is feeling a little better but having some slight abdominal pain in the morning.  Some dizziness coming from the Flagyl. The fatigue is better also.

## 2020-04-07 NOTE — TELEPHONE ENCOUNTER
Got the message. I sent antibiotics to her on Friday. Please call and find out if she is feeling better.

## 2020-04-14 ENCOUNTER — TELEPHONE (OUTPATIENT)
Dept: GASTROENTEROLOGY | Facility: CLINIC | Age: 60
End: 2020-04-14

## 2020-04-14 DIAGNOSIS — K63.89 SMALL INTESTINAL BACTERIAL OVERGROWTH: Primary | ICD-10-CM

## 2020-04-14 NOTE — TELEPHONE ENCOUNTER
If flagyl is not working for SIBO and she was intolerant to rifaximin with a bacterial overgrowth pattern seen.  Lets try one more antibiotic that may help    augmentin 875  Take 1 pill po BID x 7 days  Qs 14 pills  No refills

## 2020-04-14 NOTE — TELEPHONE ENCOUNTER
Dr Mukherjee,   Patient called and left voice message on MA line. She stated that the first week into taking the Flagyl; it helped. Now, she is back to having the same symptoms as before. She believes the Flagyl has stopped working. Please advise. Thanks

## 2020-04-15 DIAGNOSIS — K63.89 SMALL INTESTINAL BACTERIAL OVERGROWTH: Primary | ICD-10-CM

## 2020-04-15 RX ORDER — AMOXICILLIN AND CLAVULANATE POTASSIUM 875; 125 MG/1; MG/1
1 TABLET, FILM COATED ORAL 2 TIMES DAILY
Qty: 14 TABLET | Refills: 0 | Status: SHIPPED | OUTPATIENT
Start: 2020-04-15 | End: 2020-04-22

## 2020-04-23 ENCOUNTER — OFFICE VISIT (OUTPATIENT)
Dept: CARDIOLOGY | Facility: CLINIC | Age: 60
End: 2020-04-23

## 2020-04-23 VITALS
BODY MASS INDEX: 23.32 KG/M2 | HEIGHT: 65 IN | SYSTOLIC BLOOD PRESSURE: 132 MMHG | HEART RATE: 76 BPM | DIASTOLIC BLOOD PRESSURE: 78 MMHG | WEIGHT: 140 LBS

## 2020-04-23 DIAGNOSIS — I10 ESSENTIAL HYPERTENSION: Primary | ICD-10-CM

## 2020-04-23 DIAGNOSIS — R01.1 HEART MURMUR: ICD-10-CM

## 2020-04-23 DIAGNOSIS — R00.2 PALPITATIONS: ICD-10-CM

## 2020-04-23 DIAGNOSIS — I11.9 HYPERTENSIVE HEART DISEASE WITHOUT HEART FAILURE: ICD-10-CM

## 2020-04-23 PROCEDURE — 99214 OFFICE O/P EST MOD 30 MIN: CPT | Performed by: INTERNAL MEDICINE

## 2020-04-23 NOTE — PROGRESS NOTES
Subjective:     Encounter Date:04/23/2020    Patient ID: Nancy Albert is a 59 y.o. single -American female from [place], retired from the [employer].     PHYSICIAN: Franklin Will MD  SLEEP PHYSICIAN: [facility]  REFERRING HEALTHCARE PROVIDER: ANA Theodore  GI PHYSICIAN:  Nicola Mukherjee MD    Chief Complaint:   Chief Complaint   Patient presents with   • Hypertension     f/u     Problem List:  1. Hypertensive cardiovascular disease:  a. Graded exercise test, 2/11/2016: The maximal exercise stress test negative by ST criteria.  No precordial  symptoms developed.  Normal systolic blood pressure response.  Normal exercise tolerance.  No ventricular ectopy recorded.  Clinical correlation required.  b. Remote 24-hour Holter monitor approximately 2016 with her physician: Normal-data deficit   c. Stress echocardiogram, 10/12/2018: Normal stress echo with no significant echocardiographic evidence for myocardial ischemia, EF 0.56-0.60  d. Holter monitor, 9/6/2018: Abnormal monitor study with one episode of nonsustained V. tach, 6 beats in duration.  Occasional PVCs.  Rare, brief episodes of PAT.  Patient triggered event related was sinus rhythm  e. 24-hour ambulatory blood pressure monitor November 2018: Average blood pressure 100-120/60-70 torr, heart rate 60-70 bpm, maximum blood pressure 160/85  f. Residual class I symptoms with intermittent random atypical chest pain, March 2019  2. Hypertension  3. Apparent asymptomatic nonsustained ventricular tachycardia during hypokalemia, incidentally found on Holter monitor, September 2018  4. Palpitations with Holter Monitor (9/6/2018): Abnormal monitor study with one episode of nonsustained V. tach, 6 beats in duration.  Occasional PVCs.  Rare, brief episodes of PAT, with recent increased severity of palpitation and event monitor completed - data deficit, April 2020   5. Cardiac murmur with acceptable  echocardiogram, October 2018  6. At risk for sleep apnea with negative sleep study in 2017-data deficit  7. Acid reflux, diagnosed August 2019, with repeat ED visits winter 2020 with abdominal pain and continued GI evaluation and treatment, April 2020  8. Surgical history: KATI, left oophorectomy     Allergies   Allergen Reactions   • Beta Adrenergic Blockers GI Intolerance   • Verapamil Hcl Er GI Intolerance       Current Outpatient Medications:   •  AMLODIPINE BENZOATE PO, Take 5 mg by mouth., Disp: , Rfl: (takes 0.5 tablet in PM)  •  losartan-hydrochlorothiazide (HYZAAR) 100-25 MG per tablet, Take 1 tablet by mouth Daily., Disp: , Rfl:   •  Multiple Vitamins-Minerals (MULTIVITAMIN ADULT PO), Take  by mouth Daily., Disp: , Rfl:     History of Present Illness: Patient returns for scheduled 7-month followup.  She has been seen in the [facility] ED 4 times since we last saw her in the office; on 12/30/2019 with intermittent dizziness, on 02/07/2020 with upper abdominal pain, on 03/19/2020 with abdominal pain, and on 03/27/2020 with increasing abdominal pain, with no hospital admissions. She apparently underwent endoscopy at [facility] and was found to be positive for bacteria overgrowth on hydrogen breath results and was started on Flagyl for SIBO (small intestinal bacterial overgrowth); however, after one week on this antibiotic, the patient thought it had quit working, so her GI physician started her on Augmentin 875 mg twice daily x7 days on 04/14/2020.  She says the Augmentin has helped and that she is better but not completely recovered.  She is still fatigued and tired, and she has still having muscle aches.  She has lost 11 pounds since last being seen in our office; she has not had diarrhea but has had occasional constipation.  She says that Dr. Mukherjee found a hiatal hernia when he did endoscopy in August 2019.  She says she tries to take her blood pressure every day, and when she checks it at home, it is  "\"fine.\"  She notes that when she is feeling well, her blood pressure is normal, but when she is feeling bad, her blood pressure is also bad.  The weight loss is worrying her.  She indicates that she always tried to eat something, even if her appetite has not been good, but the weight \"just kept rolling off.\"  She states that her H. Pylori breath test and H. Pylori stool test were both negative - data deficit.  She has not been exercising much because she has felt so bad recently.  She has only started feeling better over the last 2-3 days on the Augmentin.  Her sister came to stay with her about 3 weeks ago to help her do things around the house, and the patient's 12-year-old grandson also lives with her, but he has mental disabilities.  She thinks she has something for nausea at home that was given to her in the ER, but she says she does not take it because she really has not had much nausea.  She does note that the Flagyl caused lightheadedness.  The patient says that when she was taking verapamil, she would wake up during the night, and her heart would be \"pounding and pounding.\"  Her bowels are moving well now, and she has not noticed any lower extremity swelling.  She brought her blood pressure monitor with her today so that we could compare the results with our blood pressure cuff to make sure it is accurate.  She has no chest pain, tightness, or pressure with her daily activities.  She states she is scheduled for a test in May 2020 to check her gastric emptying.  She recently returned her event monitor, but we do not have those results yet; she knows that she had one long episode of palpitations while she was wearing it.  We will review this report and send her the results by letter when available.      ROS   Obtained and negative except as outlined in problem list and HPI.    Procedures       Objective:       Vitals:    04/23/20 1029 04/23/20 1032 04/23/20 1047   BP: 150/86 133/79 132/78   BP Location: Left " "arm Left arm Left arm   Patient Position:  Standing Sitting   Pulse: 73 76    Weight: 63.5 kg (140 lb)     Height: 165.1 cm (65\")     Blood pressure result with patient's machine shows 150/100, left arm sitting  Body mass index is 23.3 kg/m².   Last weight:  151 lbs.    Physical Exam   Constitutional: She is oriented to person, place, and time. She appears well-developed and well-nourished.   Neck: No JVD present. Carotid bruit is not present. No thyromegaly present.   Cardiovascular: Regular rhythm, S1 normal and S2 normal. Exam reveals no gallop, no S3 and no friction rub.   Murmur heard.   Medium-pitched early systolic murmur is present with a grade of 2/6 at the lower left sternal border.  Pulses:       Dorsalis pedis pulses are 2+ on the right side, and 2+ on the left side.        Posterior tibial pulses are 2+ on the right side, and 2+ on the left side.   Pulmonary/Chest: Effort normal and breath sounds normal. She has no wheezes. She has no rhonchi. She has no rales.   Abdominal: Soft. She exhibits no mass. There is no hepatosplenomegaly. There is no tenderness. There is no guarding.   Bowel sounds audible x4   Musculoskeletal: Normal range of motion. She exhibits no edema.   Lymphadenopathy:     She has no cervical adenopathy.   Neurological: She is alert and oriented to person, place, and time.   Skin: Skin is warm, dry and intact. No rash noted.   Vitals reviewed.        Lab Review:   01/07/2020, abdominal ultrasound:  IMPRESSION: Nonobstructing stone seen in left kidney. There is cyst seen within the liver. The remainder of the ultrasound of the abdomen is unremarkable.    02/06/2020, abdomen/pelvis CT:  IMPRESSION: No acute intra-abdominal or pelvic process is appreciated.      03/09/2020, chest x-ray:  FINDINGS: Single portable AP view(s) of the chest.  The heart and mediastinal contours are normal. The lungs are clear. No pneumothorax or pleural effusion.     IMPRESSION: No acute cardiopulmonary " findings. No free air seen in the upper abdomen    04/01/2020:  · Magnesium - 2.3  · Sodium - 145  · Potassium - 4.7  · Chloride - 104    Lab Results   Component Value Date    GLUCOSE 98 04/01/2020    BUN 14 04/01/2020    CREATININE 0.76 04/01/2020    EGFRIFAFRI 94 04/01/2020    BCR 18.4 04/01/2020    CO2 28.7 04/01/2020    CALCIUM 10.0 04/01/2020    ALBUMIN 4.80 03/23/2020    AST 12 03/23/2020    ALT 7 03/23/2020       Lab Results   Component Value Date    WBC 5.03 03/23/2020    HGB 13.2 03/23/2020    HCT 42.2 03/23/2020    MCV 88.3 03/23/2020     03/23/2020           Assessment:       Overall continued acceptable course with no new interim cardiopulmonary complaints with marginal functional status. We will defer additional diagnostic or therapeutic intervention from a cardiac perspective at this time other than to prescribe a new home blood pressure monitor and obtain and review the results of her recently completed event monitor.  She is very sensitive to medications, and I would be very cautious with any further trials of cardiac treatment.  I am perplexed by her chronic GI complaints and weight loss; will defer to Dr. Mukherjee's recommendations.     Diagnosis Plan   1. Essential hypertension  Labile readings; Continue current treatment.    2. Palpitations  Await results of event monitor   3. Heart murmur  Stable examination; defer additional studies at this time   4. Hypertensive heart disease without heart failure  No recurrent angina pectoris or CHF on current activity schedule; continue current treatment            Plan:         1. Patient to continue current medications and close follow up with the above providers.  2. Prescription provided for new home blood pressure monitor.  3. Tentative cardiology follow up in August or September 2020, or patient may return sooner PRN.    Transcribed by Yandy Shah for Dr. Paramjit Beckford at 10:41 AM on 04/23/2020    IParamjit MD, [facility], personally  performed the services described in this documentation as scribed by the above named individual in my presence, and it is both accurate and complete. At 11:01 AM on 04/23/2020

## 2020-04-28 ENCOUNTER — TELEPHONE (OUTPATIENT)
Dept: GASTROENTEROLOGY | Facility: CLINIC | Age: 60
End: 2020-04-28

## 2020-04-28 DIAGNOSIS — K63.89 SMALL INTESTINAL BACTERIAL OVERGROWTH: Primary | ICD-10-CM

## 2020-04-28 NOTE — TELEPHONE ENCOUNTER
Dr Mukherjee,  I called patient back. Patient stated she has finished the antibiotics on 4/23/20. Continuing to have the same symptoms again ( abdominal pain, fatigue, bloating) discomfort. Please advise.

## 2020-04-29 RX ORDER — METRONIDAZOLE 500 MG/1
500 TABLET ORAL 3 TIMES DAILY
Qty: 30 TABLET | Refills: 1 | Status: SHIPPED | OUTPATIENT
Start: 2020-04-29 | End: 2020-05-13

## 2020-04-29 NOTE — TELEPHONE ENCOUNTER
I'm afraid we have exhausted a lot of options with ms. Albert. If she felt great on flagyl she can do another course of antibiotics for small intestinal bacterial overgrowth.   (flagyl 500 mg, 500 mg po tid x 14 days, 1 refill)    She has avoided high resolution manometry due to discomfort with the test.    I would encourage a second opinion from GI at the [facility].  If she would like the referral, please make.  Thank you.

## 2020-04-29 NOTE — TELEPHONE ENCOUNTER
I spoke with Ms Albert. I gave her Dr Mukherjee's recommendation. She stated she is feeling a little better. She wants to hold off on the Flagyl for now. I explained to her that I would send it to her pharmacy; she can pick medication up when ready.. Patient agreed. Patient stated that she did have a video visit with the GI doctor at . She will call him also to see what he recommend her doing. Ms Albert is still refusing to follow through with the High resolution manometry test.

## 2020-05-01 ENCOUNTER — TELEPHONE (OUTPATIENT)
Dept: CARDIOLOGY | Facility: CLINIC | Age: 60
End: 2020-05-01

## 2020-05-01 ENCOUNTER — APPOINTMENT (OUTPATIENT)
Dept: CT IMAGING | Facility: HOSPITAL | Age: 60
End: 2020-05-01

## 2020-05-01 ENCOUNTER — HOSPITAL ENCOUNTER (EMERGENCY)
Facility: HOSPITAL | Age: 60
Discharge: HOME | End: 2020-05-01
Attending: EMERGENCY MEDICINE | Admitting: EMERGENCY MEDICINE

## 2020-05-01 ENCOUNTER — APPOINTMENT (OUTPATIENT)
Dept: GENERAL RADIOLOGY | Facility: HOSPITAL | Age: 60
End: 2020-05-01

## 2020-05-01 VITALS
SYSTOLIC BLOOD PRESSURE: 164 MMHG | OXYGEN SATURATION: 100 % | HEIGHT: 65 IN | DIASTOLIC BLOOD PRESSURE: 86 MMHG | HEART RATE: 61 BPM | BODY MASS INDEX: 23.16 KG/M2 | RESPIRATION RATE: 16 BRPM | WEIGHT: 139 LBS | TEMPERATURE: 98.7 F

## 2020-05-01 DIAGNOSIS — R10.10 UPPER ABDOMINAL PAIN: Primary | ICD-10-CM

## 2020-05-01 DIAGNOSIS — R07.2 PRECORDIAL PAIN: ICD-10-CM

## 2020-05-01 LAB
ALBUMIN SERPL-MCNC: 5.4 G/DL (ref 3.5–5.2)
ALBUMIN/GLOB SERPL: 1.5 G/DL
ALP SERPL-CCNC: 66 U/L (ref 39–117)
ALT SERPL W P-5'-P-CCNC: 9 U/L (ref 1–33)
ANION GAP SERPL CALCULATED.3IONS-SCNC: 13 MMOL/L (ref 5–15)
AST SERPL-CCNC: 18 U/L (ref 1–32)
BASOPHILS # BLD AUTO: 0.02 10*3/MM3 (ref 0–0.2)
BASOPHILS NFR BLD AUTO: 0.5 % (ref 0–1.5)
BILIRUB SERPL-MCNC: 0.5 MG/DL (ref 0.2–1.2)
BUN BLD-MCNC: 15 MG/DL (ref 8–23)
BUN/CREAT SERPL: 21.1 (ref 7–25)
CALCIUM SPEC-SCNC: 9.9 MG/DL (ref 8.6–10.5)
CHLORIDE SERPL-SCNC: 100 MMOL/L (ref 98–107)
CO2 SERPL-SCNC: 28 MMOL/L (ref 22–29)
CREAT BLD-MCNC: 0.71 MG/DL (ref 0.57–1)
DEPRECATED RDW RBC AUTO: 39.8 FL (ref 37–54)
EOSINOPHIL # BLD AUTO: 0.04 10*3/MM3 (ref 0–0.4)
EOSINOPHIL NFR BLD AUTO: 1 % (ref 0.3–6.2)
ERYTHROCYTE [DISTWIDTH] IN BLOOD BY AUTOMATED COUNT: 12.2 % (ref 12.3–15.4)
GFR SERPL CREATININE-BSD FRML MDRD: 102 ML/MIN/1.73
GLOBULIN UR ELPH-MCNC: 3.5 GM/DL
GLUCOSE BLD-MCNC: 99 MG/DL (ref 65–99)
HCT VFR BLD AUTO: 45.9 % (ref 34–46.6)
HGB BLD-MCNC: 14.2 G/DL (ref 12–15.9)
HOLD SPECIMEN: NORMAL
HOLD SPECIMEN: NORMAL
IMM GRANULOCYTES # BLD AUTO: 0 10*3/MM3 (ref 0–0.05)
IMM GRANULOCYTES NFR BLD AUTO: 0 % (ref 0–0.5)
LIPASE SERPL-CCNC: 284 U/L (ref 13–60)
LYMPHOCYTES # BLD AUTO: 1.3 10*3/MM3 (ref 0.7–3.1)
LYMPHOCYTES NFR BLD AUTO: 31.9 % (ref 19.6–45.3)
MCH RBC QN AUTO: 27.5 PG (ref 26.6–33)
MCHC RBC AUTO-ENTMCNC: 30.9 G/DL (ref 31.5–35.7)
MCV RBC AUTO: 88.8 FL (ref 79–97)
MONOCYTES # BLD AUTO: 0.15 10*3/MM3 (ref 0.1–0.9)
MONOCYTES NFR BLD AUTO: 3.7 % (ref 5–12)
NEUTROPHILS # BLD AUTO: 2.57 10*3/MM3 (ref 1.7–7)
NEUTROPHILS NFR BLD AUTO: 62.9 % (ref 42.7–76)
NRBC BLD AUTO-RTO: 0 /100 WBC (ref 0–0.2)
NT-PROBNP SERPL-MCNC: 14.6 PG/ML (ref 5–900)
PLATELET # BLD AUTO: 220 10*3/MM3 (ref 140–450)
PMV BLD AUTO: 11.1 FL (ref 6–12)
POTASSIUM BLD-SCNC: 4 MMOL/L (ref 3.5–5.2)
PROT SERPL-MCNC: 8.9 G/DL (ref 6–8.5)
RBC # BLD AUTO: 5.17 10*6/MM3 (ref 3.77–5.28)
SODIUM BLD-SCNC: 141 MMOL/L (ref 136–145)
TROPONIN T SERPL-MCNC: <0.01 NG/ML (ref 0–0.03)
WBC NRBC COR # BLD: 4.08 10*3/MM3 (ref 3.4–10.8)
WHOLE BLOOD HOLD SPECIMEN: NORMAL
WHOLE BLOOD HOLD SPECIMEN: NORMAL

## 2020-05-01 PROCEDURE — 99284 EMERGENCY DEPT VISIT MOD MDM: CPT

## 2020-05-01 PROCEDURE — 83880 ASSAY OF NATRIURETIC PEPTIDE: CPT | Performed by: EMERGENCY MEDICINE

## 2020-05-01 PROCEDURE — 83690 ASSAY OF LIPASE: CPT | Performed by: EMERGENCY MEDICINE

## 2020-05-01 PROCEDURE — 93005 ELECTROCARDIOGRAM TRACING: CPT | Performed by: EMERGENCY MEDICINE

## 2020-05-01 PROCEDURE — 74177 CT ABD & PELVIS W/CONTRAST: CPT

## 2020-05-01 PROCEDURE — 84484 ASSAY OF TROPONIN QUANT: CPT | Performed by: EMERGENCY MEDICINE

## 2020-05-01 PROCEDURE — 25010000002 IOPAMIDOL 61 % SOLUTION: Performed by: EMERGENCY MEDICINE

## 2020-05-01 PROCEDURE — 85025 COMPLETE CBC W/AUTO DIFF WBC: CPT | Performed by: EMERGENCY MEDICINE

## 2020-05-01 PROCEDURE — 71045 X-RAY EXAM CHEST 1 VIEW: CPT

## 2020-05-01 PROCEDURE — 80053 COMPREHEN METABOLIC PANEL: CPT | Performed by: EMERGENCY MEDICINE

## 2020-05-01 RX ORDER — TRAMADOL HYDROCHLORIDE 50 MG/1
50 TABLET ORAL EVERY 6 HOURS PRN
Qty: 12 TABLET | Refills: 0 | Status: SHIPPED | OUTPATIENT
Start: 2020-05-01 | End: 2020-09-28

## 2020-05-01 RX ORDER — ASPIRIN 81 MG/1
324 TABLET, CHEWABLE ORAL ONCE
Status: COMPLETED | OUTPATIENT
Start: 2020-05-01 | End: 2020-05-01

## 2020-05-01 RX ORDER — SODIUM CHLORIDE 0.9 % (FLUSH) 0.9 %
10 SYRINGE (ML) INJECTION AS NEEDED
Status: DISCONTINUED | OUTPATIENT
Start: 2020-05-01 | End: 2020-05-01 | Stop reason: HOSPADM

## 2020-05-01 RX ADMIN — IOPAMIDOL 85 ML: 612 INJECTION, SOLUTION INTRAVENOUS at 14:00

## 2020-05-01 RX ADMIN — ASPIRIN 81 MG 324 MG: 81 TABLET ORAL at 11:08

## 2020-05-01 NOTE — DISCHARGE INSTRUCTIONS
Patient is advised to take Tylenol or ibuprofen as needed to help with pain.    Take Ultram as needed for more severe pain.    Follow-up with Dr. Mukherjee of GI for further outpatient evaluation.    Follow-up with primary care physician for recheck of lipase in approximately 1 week.

## 2020-05-01 NOTE — ED PROVIDER NOTES
Subjective   59-year-old female presents with complaint of upper abdominal pain, chest pain, and upper back pain.  These type of symptoms have been present persistently for several months.  She has been to the emergency department multiple times and has followed up with both cardiology and GI on an outpatient basis.  The patient has been diagnosed with bacterial overgrowth syndrome and is currently taking antibiotics.  She is concerned that her symptoms have continued to persist and now represents here to the ER.  She denies any history of coronary artery disease, and no previous history of DVT or PE.  She denies any fever or systemic symptoms of infection.  No cough, shortness of breath, fever, sick contacts, or recent travel.  Previous surgeries to the abdomen include partial hysterectomy.  No surgery to organs of the upper abdomen.  No history of bowel obstruction.  Reports bowel movements have been normal, urination has been normal, and has been passing gas normally.  Denies any alcohol use or illicit drug use.  No other reported aggravating, alleviating, or associated symptoms.          Review of Systems   Constitutional: Positive for fatigue. Negative for chills and fever.   HENT: Negative for congestion, ear pain, postnasal drip, sinus pressure and sore throat.    Eyes: Negative for pain, redness and visual disturbance.   Respiratory: Negative for cough, chest tightness and shortness of breath.    Cardiovascular: Positive for chest pain. Negative for palpitations and leg swelling.   Gastrointestinal: Positive for abdominal pain. Negative for anal bleeding, blood in stool, diarrhea, nausea and vomiting.   Endocrine: Negative for polydipsia and polyuria.   Genitourinary: Negative for difficulty urinating, dysuria, frequency and urgency.   Musculoskeletal: Negative for arthralgias, back pain and neck pain.   Skin: Negative for pallor and rash.   Allergic/Immunologic: Negative for environmental allergies and  immunocompromised state.   Neurological: Negative for dizziness, weakness and headaches.   Hematological: Negative for adenopathy.   Psychiatric/Behavioral: Negative for confusion, self-injury and suicidal ideas. The patient is not nervous/anxious.    All other systems reviewed and are negative.      Past Medical History:   Diagnosis Date   • Arthritis 2019   • GERD (gastroesophageal reflux disease)    • Heart murmur    • Hypertension        Allergies   Allergen Reactions   • Beta Adrenergic Blockers GI Intolerance   • Verapamil Hcl Er GI Intolerance       Past Surgical History:   Procedure Laterality Date   • HYSTERECTOMY  07/15/2008   • OOPHORECTOMY Left 07/15/2008       Family History   Problem Relation Age of Onset   • Cancer Mother    • Arthritis Father    • No Known Problems Sister    • No Known Problems Brother    • Pancreatic cancer Maternal Grandmother    • No Known Problems Maternal Grandfather    • No Known Problems Paternal Grandmother    • No Known Problems Paternal Grandfather    • No Known Problems Daughter    • Breast cancer Neg Hx    • Ovarian cancer Neg Hx        Social History     Socioeconomic History   • Marital status: Single     Spouse name: Not on file   • Number of children: Not on file   • Years of education: Not on file   • Highest education level: Not on file   Occupational History   • Occupation: Retired   Tobacco Use   • Smoking status: Never Smoker   • Smokeless tobacco: Never Used   Substance and Sexual Activity   • Alcohol use: No     Frequency: Never   • Drug use: No   • Sexual activity: Defer   Social History Narrative    Caffeine: None    Patient lives at her home with her 11 year old grandson           Objective   Physical Exam   Constitutional: She is oriented to person, place, and time. She appears well-developed and well-nourished.  Non-toxic appearance. No distress.   HENT:   Head: Normocephalic and atraumatic.   Right Ear: External ear normal.   Left Ear: External ear  normal.   Nose: Nose normal.   Eyes: Pupils are equal, round, and reactive to light. EOM and lids are normal.   Neck: Normal range of motion. Neck supple. No tracheal deviation present.   Cardiovascular: Normal rate, regular rhythm and normal heart sounds. Exam reveals no gallop, no friction rub and no decreased pulses.   No murmur heard.  Pulmonary/Chest: Effort normal and breath sounds normal. No respiratory distress. She has no decreased breath sounds. She has no wheezes. She has no rhonchi. She has no rales.   Abdominal: Soft. Normal appearance and bowel sounds are normal. There is no tenderness. There is no rebound and no guarding.   Musculoskeletal: Normal range of motion. She exhibits no deformity.   Lymphadenopathy:     She has no cervical adenopathy.   Neurological: She is alert and oriented to person, place, and time. She has normal strength. No cranial nerve deficit or sensory deficit.   Skin: Skin is warm and dry. No rash noted. She is not diaphoretic.   Psychiatric: She has a normal mood and affect. Her speech is normal and behavior is normal. Judgment and thought content normal. Cognition and memory are normal.   Nursing note and vitals reviewed.      Procedures           ED Course                                           MDM  Number of Diagnoses or Management Options  Precordial pain: new and requires workup  Upper abdominal pain: new and requires workup  Diagnosis management comments: Labs show a lipase is elevated to 280.  CT scan of the abdomen pelvis with IV contrast does not show any acute abnormalities.  The previous identified liver cyst are still present.  No pancreatic abnormalities present.    The patient continues to appear well throughout the ER course.  She desires to go home with this given time.    She will be discharged with the advised to take Tylenol or ibuprofen as needed for pain, and will be discharged with Ultram to take as needed for more severe pain.    She is advised to  follow-up with GI, Dr. Mukherjee, on outpatient basis.  He is also advised to follow-up with primary care physician for recheck of lipase within the next week.    Advised to return to the ER with any further concern or worsening of symptoms.       Amount and/or Complexity of Data Reviewed  Clinical lab tests: ordered and reviewed  Tests in the radiology section of CPT®: ordered and reviewed  Decide to obtain previous medical records or to obtain history from someone other than the patient: yes  Review and summarize past medical records: yes  Independent visualization of images, tracings, or specimens: yes        Final diagnoses:   Upper abdominal pain   Precordial pain            Jade Griffiths MD  05/01/20 0502

## 2020-05-01 NOTE — TELEPHONE ENCOUNTER
"Pt called and states that yesterday she had chest pain, left shoulder pain and left neck pain, this pain lasted for \"awhile\". Pt reports she feels weak and fatigued this morning and had a \"rough night\".    Advised pt to go to [facility] ER to be assessed.     Pt verbalizes understanding and agreeable to plan.      "

## 2020-05-07 ENCOUNTER — TELEPHONE (OUTPATIENT)
Dept: CARDIOLOGY | Facility: CLINIC | Age: 60
End: 2020-05-07

## 2020-05-07 NOTE — TELEPHONE ENCOUNTER
Pt called and stated that she discontinued amlodipine 5/3/20, and is only taking losartan-HCTZ 100-25 mg daily.    Advised pt to monitor BP let us know if BP maintains >140/90.    Pt verbalizes understanding and agreeable to plan.

## 2020-05-08 ENCOUNTER — TELEPHONE (OUTPATIENT)
Dept: GASTROENTEROLOGY | Facility: CLINIC | Age: 60
End: 2020-05-08

## 2020-05-08 NOTE — TELEPHONE ENCOUNTER
I called patient back. Patient had some questions about her Gastric emtpying study procedure next week. I answered her questions to the best of my ability. Also she stated that her PCP recommend her having another MRI and EGD. I explained to patient that PCP will be the one to ordered MRI and submit a referral to Dr Mukherjee to have another EGD. Dr Mukherjee will make that call if he thinks she needs another EGD. Patient stated she will contact her PCP.

## 2020-05-08 NOTE — TELEPHONE ENCOUNTER
Got the message.  Agree with your message. If her pcp would like another EGD, I can perform this and will ascertain small bowel effluent to assess bacterial counts for possible SIBO.

## 2020-05-11 ENCOUNTER — TELEPHONE (OUTPATIENT)
Dept: CARDIOLOGY | Facility: CLINIC | Age: 60
End: 2020-05-11

## 2020-05-11 NOTE — TELEPHONE ENCOUNTER
Called pt regarding 14- day monitor. The monitor she worse did not have any data and she needs to have new monitor placed.    Pt verbalizes understanding and agreeable to plan.

## 2020-05-12 ENCOUNTER — HOSPITAL ENCOUNTER (OUTPATIENT)
Dept: NUCLEAR MEDICINE | Facility: HOSPITAL | Age: 60
Discharge: HOME | End: 2020-05-12

## 2020-05-12 DIAGNOSIS — R11.0 NAUSEA: ICD-10-CM

## 2020-05-12 DIAGNOSIS — R10.9 CHRONIC ABDOMINAL PAIN: ICD-10-CM

## 2020-05-12 DIAGNOSIS — G89.29 CHRONIC ABDOMINAL PAIN: ICD-10-CM

## 2020-05-12 PROCEDURE — 78264 GASTRIC EMPTYING IMG STUDY: CPT

## 2020-05-12 PROCEDURE — A9541 TC99M SULFUR COLLOID: HCPCS | Performed by: INTERNAL MEDICINE

## 2020-05-12 PROCEDURE — 0 TECHNETIUM SULFUR COLLOID: Performed by: INTERNAL MEDICINE

## 2020-05-12 RX ADMIN — TECHNETIUM TC 99M SULFUR COLLOID 1 DOSE: KIT at 10:22

## 2020-05-13 ENCOUNTER — TELEPHONE (OUTPATIENT)
Dept: GASTROENTEROLOGY | Facility: CLINIC | Age: 60
End: 2020-05-13

## 2020-05-15 ENCOUNTER — HOSPITAL ENCOUNTER (OUTPATIENT)
Dept: MAMMOGRAPHY | Facility: HOSPITAL | Age: 60
Discharge: HOME | End: 2020-05-15
Admitting: OBSTETRICS & GYNECOLOGY

## 2020-05-15 DIAGNOSIS — Z12.31 VISIT FOR SCREENING MAMMOGRAM: ICD-10-CM

## 2020-05-15 PROCEDURE — 77067 SCR MAMMO BI INCL CAD: CPT | Performed by: RADIOLOGY

## 2020-05-15 PROCEDURE — 77063 BREAST TOMOSYNTHESIS BI: CPT

## 2020-05-15 PROCEDURE — 77067 SCR MAMMO BI INCL CAD: CPT

## 2020-05-15 PROCEDURE — 77063 BREAST TOMOSYNTHESIS BI: CPT | Performed by: RADIOLOGY

## 2020-05-22 ENCOUNTER — TELEPHONE (OUTPATIENT)
Dept: GASTROENTEROLOGY | Facility: CLINIC | Age: 60
End: 2020-05-22

## 2020-05-22 NOTE — TELEPHONE ENCOUNTER
PT CALLED LVM STATES THE RX FOR GINGERROOT WAS NOT RECEIVED BY PHARMACY; RETURNED PT CALL, ADVISED DR. BLOUNT SENT RX ON 5/20. SHE WILL CALL PHARMACY TO F/U IN REGARDS TO RX.

## 2020-06-16 ENCOUNTER — TELEPHONE (OUTPATIENT)
Dept: CARDIOLOGY | Facility: CLINIC | Age: 60
End: 2020-06-16

## 2020-06-16 ENCOUNTER — TELEPHONE (OUTPATIENT)
Dept: GASTROENTEROLOGY | Facility: CLINIC | Age: 60
End: 2020-06-16

## 2020-06-16 NOTE — TELEPHONE ENCOUNTER
Pt called and states that she woke up shaking, and felt like her heart was pounding, with SOB. Pt states it lasted for a few minutes. When pt felt these symptoms resolve, she checked her /117 .  Patient states this has happened a couple times before. Previously she was taking Flagyl when she had an episode like this. Patient states that she feels fatigued daily, and has for several months.     Pt reports uncomfortable chest pain and shoulder pain today and yesterday, lasting a minute or two, waxing and wanning. Not brought on by activity. Described as dull ache. Pt reports she has been having headaches, when she wakes up.     Pt recently wore heart monitor.    Pt denies SOB, nausea, swelling, palpitations.    Pt currently taking:  Losartan-HCTZ 100-25 mg daily    Please advise.

## 2020-06-16 NOTE — TELEPHONE ENCOUNTER
Noted; she has a myriad of symptoms.  They do not sound cardiac in origin.  Would discuss these issues with her primary care physician Dr. Franklin Hunter and schedule an appointment in the next 1 week.  She additionally at last office visit was on amlodipine 5 mg daily for hypertension; is she taking this medication?  Would not pursue any additional cardiac testing at this time.  If crescendo symptoms develop she can come to [facility] ED for assessment.    Thanks!

## 2020-06-16 NOTE — TELEPHONE ENCOUNTER
Dr Mukherjee,  I called patient back. Patient states she is still having fatigue, bloating, and loosing weight. She is going by diet you recommended. Patient wants to know is there a Glucose intolerance test you can order?

## 2020-06-16 NOTE — TELEPHONE ENCOUNTER
Called pt and gave KTS recommendations above.     Pt is not currently taking amlodipine.     Pt verbalizes understanding and agreeable to plan.

## 2020-06-17 NOTE — TELEPHONE ENCOUNTER
Her duodenum was biopsied which is negative for gluten allergy.  I ordered her a blood test to look into gluten allergy but I suspect it to be negative. SHe may benefit from lexapro.

## 2020-06-17 NOTE — TELEPHONE ENCOUNTER
Dr. Mukherjee  I called MsTammy Bety in regards to the message Denia sent you , she wants to have Gluten test , not glucose. She states she is still very fatigue, minor constipation, and has pain in the abdomen area. She would like to see if you have other Gluten test she can have. I advised she may need to f/u with her PCP in regards to additional issues she is having. States that Dr. Will wanted to start her on Lexapro on Monday, but she denied that treatment because she feels there other test that can be done due to her abdominal pain and extreme fatigue.  She also seen Dr. Sheriff  States she does appear to have a distressed bladder when she had her check up on 6/16/2020.   Please Advise.  Thank you  Laxmi

## 2020-06-17 NOTE — TELEPHONE ENCOUNTER
Got the message. There is a glucose test that her primary care doctor can check to see if hyper or hypoglycemia is going on.  I would defer this management plan to her pcp.  Thank you.

## 2020-06-18 DIAGNOSIS — R10.10 PAIN OF UPPER ABDOMEN: Primary | ICD-10-CM

## 2020-06-26 ENCOUNTER — LAB (OUTPATIENT)
Dept: LAB | Facility: HOSPITAL | Age: 60
End: 2020-06-26

## 2020-06-26 DIAGNOSIS — R10.10 PAIN OF UPPER ABDOMEN: ICD-10-CM

## 2020-06-26 LAB — IGA1 MFR SER: 304 MG/DL (ref 70–400)

## 2020-06-26 PROCEDURE — 82784 ASSAY IGA/IGD/IGG/IGM EACH: CPT

## 2020-06-26 PROCEDURE — 36415 COLL VENOUS BLD VENIPUNCTURE: CPT

## 2020-06-26 PROCEDURE — 83516 IMMUNOASSAY NONANTIBODY: CPT

## 2020-06-29 LAB — TTG IGA SER-ACNC: <2 U/ML (ref 0–3)

## 2020-07-01 ENCOUNTER — TELEPHONE (OUTPATIENT)
Dept: GASTROENTEROLOGY | Facility: CLINIC | Age: 60
End: 2020-07-01

## 2020-07-01 NOTE — TELEPHONE ENCOUNTER
CALLED TO INFORM PT OF RESULTS; PT UNDERSTANDS SHE DOES NOT HAVE CELIAC DISEASE. I ADVISED SHE IS STILL NOT FEELING WELL, STILL LOOSING WEIGHT, VERY FATIGUE, SHE NOW HAS SOMEONE WITH HER MOST OF THE TIME BECAUSE SHE IS FATIGUE, NOT FEELING WELL AND VERY WEAK, SHE CAN NOT DRIVE AT TIMES EITHER BECAUSE SHE IS WEAK.  SHE WOULD LIKE TO SCHEDULE OV SOONER THAN October. I ADVISED I WOULD CONSULT WITH DR BLOUNT FOR FURTHER EVALUATION. PT VOICED UNDERSTANDING AND I WILL RETURN CALL TO PT ONCE I SPEAK WITH DR. BLOUNT.

## 2020-07-02 ENCOUNTER — PREP FOR SURGERY (OUTPATIENT)
Dept: OTHER | Facility: HOSPITAL | Age: 60
End: 2020-07-02

## 2020-07-02 DIAGNOSIS — R10.9 ABDOMINAL PAIN, UNSPECIFIED ABDOMINAL LOCATION: Primary | ICD-10-CM

## 2020-07-08 PROBLEM — R10.9 ABDOMINAL PAIN: Status: ACTIVE | Noted: 2020-07-08

## 2020-07-17 ENCOUNTER — PATIENT MESSAGE (OUTPATIENT)
Dept: GASTROENTEROLOGY | Facility: CLINIC | Age: 60
End: 2020-07-17

## 2020-07-17 ENCOUNTER — TELEPHONE (OUTPATIENT)
Dept: GASTROENTEROLOGY | Facility: CLINIC | Age: 60
End: 2020-07-17

## 2020-07-17 DIAGNOSIS — Z12.11 SCREENING FOR COLON CANCER: Primary | ICD-10-CM

## 2020-07-17 RX ORDER — SODIUM, POTASSIUM,MAG SULFATES 17.5-3.13G
1 SOLUTION, RECONSTITUTED, ORAL ORAL TAKE AS DIRECTED
Qty: 2 BOTTLE | Refills: 0 | Status: SHIPPED | OUTPATIENT
Start: 2020-07-17 | End: 2020-09-10

## 2020-07-17 NOTE — TELEPHONE ENCOUNTER
Suzanne from prior authorization for the hospital, says that [facility] denied the pill cam placement when you do the EGD. It looks like they approved the EGD but not the pill cam. Would you like to start an appeal or would you like to cancel the pill cam placement and just do the EGD next week.   They denied the pill cam for medical necessity.     Please advise.

## 2020-07-17 NOTE — TELEPHONE ENCOUNTER
From: Nancy Albert  To: Nicola Mukherjee MD  Sent: 7/17/2020 11:18 AM EDT  Subject: Test Results Question    My recent negative test result for celiac disease lead me to look further online due to much weight loss without explanation. Celiac disease is associated with Villous Atrophy where the villi erode away in the intestine leaving a flat surface. Villi are responsible for absorbing nutrients in food. You can have villous atrophy and not have celiac disease. Medication and bacterial overgrowth as well as other things can destroy intestinal villi. Villi will grow back but take longer for older adults. Since I was diagnosed with SIBO it is possible that my villi could be destroyed. 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 I request this be viewed during my upcoming Pill Capsule Endoscopy.

## 2020-07-17 NOTE — TELEPHONE ENCOUNTER
I CALLED PATIENT BACK. NO ANSWER; LEFT VOICE MESSAGE. PREP HAS BEEN SENT IN FOR PILL CAM NEXT WEEK.

## 2020-07-21 ENCOUNTER — TELEPHONE (OUTPATIENT)
Dept: GASTROENTEROLOGY | Facility: CLINIC | Age: 60
End: 2020-07-21

## 2020-07-21 NOTE — TELEPHONE ENCOUNTER
We can try medical therapy for gastroparesis but Ms. Albert does not want to do this due to the risk of the medicines.  As such, she may benefit from a nonpharmacologic approach at [facility] where dr. Faisal clark's group looks at gastric pacemakers.  Please make referral.

## 2020-07-21 NOTE — TELEPHONE ENCOUNTER
Dr Mukherjee,  Ms Bety called and left a voice message stating that her Pill Cam PA was denied by her insurance. Insurance stated that its not medically necessary.   Patient stated she is still sick and loosing weight. Where do she go from here? Please advise. Thanks

## 2020-07-22 ENCOUNTER — APPOINTMENT (OUTPATIENT)
Dept: PREADMISSION TESTING | Facility: HOSPITAL | Age: 60
End: 2020-07-22

## 2020-07-22 ENCOUNTER — TELEPHONE (OUTPATIENT)
Dept: GASTROENTEROLOGY | Facility: CLINIC | Age: 60
End: 2020-07-22

## 2020-07-22 NOTE — TELEPHONE ENCOUNTER
PT CALLED LVM IN RE: TO PROCEDURE THAT WAS CANCELED ON Friday. RETURNED PT CALL, NO ANSWER. M FOR HER TO RETURN MY CALL.

## 2020-07-23 NOTE — TELEPHONE ENCOUNTER
Please send ms. Albert to [facility] for gastroparesis and nutrition for gastroparesis

## 2020-07-23 NOTE — TELEPHONE ENCOUNTER
PATIENT WOULD LIKE TO KNOW WHAT SHE NEEDS TO DO NOW THAT THE PILL CAM WAS DENIED. SHE WOULD LIKE TO SEE NUTRITION.

## 2020-07-24 ENCOUNTER — TELEPHONE (OUTPATIENT)
Dept: CARDIOLOGY | Facility: CLINIC | Age: 60
End: 2020-07-24

## 2020-07-24 NOTE — TELEPHONE ENCOUNTER
Noted; I am perplexed by her myriad of symptoms that seem very out of proportion to her normal heart rate and blood pressure readings.  If she has not had a 24 ambulatory blood pressure monitor in the last 6-12 months this may be an option to reassure that her blood pressure readings are not that extraordinary.  Certainly there are no low blood pressure readings that would mirror her weight loss.  She needs to work with a gastroenterologist concerning her gastroparesis and her issues in that regard.  If she is willing we can pursue a technetium pyrophosphate SPECT myocardial scan to screen for cardiac amyloidosis although I feel this diagnosis is unlikely.        Thanks!

## 2020-07-24 NOTE — TELEPHONE ENCOUNTER
Spoke with pt and she is tired and fatigued all the time. No real correlation with normal or transient elevation of blood pressure.     ----- Message from Nancy Albert sent at 7/23/2020  5:08 PM EDT -----  Regarding: RE: Prescription Question  Contact: [phone]  It is up and down and varies from day to day and from morning to evening. I take BP 3 times with a 3 minute pause between takes.  July 18 @ 10:20am: 147/89 HR 60  -  127/82 HR 60  -  126/79 HR60  July 18 @ 7:50pm: 149/94 HR 70  -  135/87 HR 66  -  140/90 HR 65  July 19 @ 9:15am:  126/87 HR 65  -  120/81 HR 72  -  118/75 HR 64  July 19 @ 6:00pm: 146/85 HR 61  -  124/84 HR 61  -  136/87 HR 61  July 20 @ 11:45am: 116/77 HR 66  -  109/76 HR 60  -  121/77 HR 61  July 23 @ 8:30am: 158/89 HR 67  -  123/87 HR 76  -  139/92 HR 66  Read online that gastroparesis may impact drug administration and absorption.  The rate of stomach emptying affects all drugs, even those which are well absorbed in the stomach.  ----- Message -----  From: DEEPTI BAER  Sent: 7/23/2020 12:28 PM EDT  To: Nancy Albert  Subject: RE: Prescription Question  Nancy,     What is your heart rate and blood pressure running?         ----- Message -----     From: Nancy Albert     Sent: 7/23/2020 10:20 AM EDT       To: Paramjit Beckford MD  Subject: Prescription Question    Per last email concerning meds, Dr. Will  my Losartan/HTCZ into 2 separate pills, but still having side effects not related to my gastroparesis. I have a constant feeling in my throat as if there is a lump or cotton (I've complained before about this).  Wake up every morning with low grade headache, dry mouth and jittery.  The jittery feeling last throughout the day.  Within 1 - 2 hours after taking meds I get extremely fatigued, tired and sleepy; this will last for several hours or all day.  I also have chest and back pains after taking meds; pain will subside. Unable to sleep. My heart pounds when I wake up in  the morning. Side effects are daily. Could meds be too strong now that I lost weight; weight is now 131 pds.

## 2020-07-27 NOTE — TELEPHONE ENCOUNTER
Pt aware of below KTS recommendations. She is seeing her PCP, Dr. Will this morning. She particularly notes her BP tends to increase in the afternoon. She will relay KTS recommendations to PCP and follow up with our office if she wishes to pursue ABPM or SPECT scan.

## 2020-08-05 ENCOUNTER — TELEPHONE (OUTPATIENT)
Dept: CARDIOLOGY | Facility: CLINIC | Age: 60
End: 2020-08-05

## 2020-08-05 NOTE — TELEPHONE ENCOUNTER
"----- Message from Nancy Albert sent at 8/5/2020 10:32 AM EDT -----  Regarding: RE: Prescription Question  Contact: [phone]  Losartan Potassium 100MG and Hydrochlorothiazide 25MG  ----- Message -----  From: DEEPTI HUNT  Sent: 8/5/2020  9:47 AM EDT  To: Nancy Albert  Subject: RE: Prescription Question  Ok, give me exactly what medications you are taking daily for blood pressure and I will forward this information to Dr. Beckford.     Thanks,   Prachi Brian RN      ----- Message -----     From: Nancy Albert     Sent: 8/5/2020  9:32 AM EDT       To: Paramjit Beckford MD  Subject: RE: Prescription Question    BP taken Aug 4 @ 9:35am before meds. Took meds yesterday morning and by evening started feeling jittery and lightheaded. BP after meds @ 6:30 pm 147/93 HR 64;  134/85 HR 64;  128/87 HR 64. Woke up this morning jittery and headache. I read that \"gastroparesis may substantially impact drug administration and absorption. The rate of stomach emptying affects all drugs.\"  Also read that \"older adults process drugs more slowly. A normal dosage may cause levels of a drug to be higher in the body.\" Possibly gastroparesis causing drug to remain in stomach/small intestine much longer which causes fatigue/weakness. May need lower dosage? No meds for 3 days I had no symptoms. Had no symptoms prior to gastroparesis diagnosis; have been on meds for yrs.  ----- Message -----  From: DEEPTI HUNT  Sent: 8/4/2020  1:35 PM EDT  To: Nancy Albert  Subject: RE: Prescription Question  Were these blood pressure readings from today after taking your medications?      ----- Message -----     From: Nancy Albert     Sent: 8/4/2020 11:56 AM EDT       To: Paramjit Beckford MD  Subject: Prescription Question    I asked before about my meds and I would like to have them evaluated again. On Losartan Potassium 100MG and Hydrochlorothiazide 25MG. As stated earlier 1 - 2 hours after taking meds I become jittery, fatigued, " tired, sleepy. I have chest/back pain, uncomfortable feeling in my throat, unable to sleep.  This has been occurring daily for the past 8 months or longer.  I stopped meds on Aug 1 and by late evening I began to feel better; Aug 2-3 the jitteriness and fatigue went away and I was able to perform normal daily functions.  Today BP elevated so I took meds and will wait to see if side effects return. Meds may be too strong since I lost weight.  BP today:  153/97 HR 68;  147/91 HR 66;  150/93 HR 66.

## 2020-08-05 NOTE — TELEPHONE ENCOUNTER
"Please see patient correspondence below.    Pt currently taking losartan 100 mg and HCTZ 25 mg and complains of \"jittery feeling\" she associates with the medications.     Please advise.  "

## 2020-08-05 NOTE — TELEPHONE ENCOUNTER
Noted; blood pressure readings and heart rate readings called in are within normal limits.  Nonetheless she can reduce her losartan to 50 mg daily and HCTZ to 12.5 mg daily.  I do not feel gastroparesis is causing these issues in terms of drug medications but certainly may calls weakness and additional complaints.    Thanks!

## 2020-08-06 RX ORDER — HYDROCHLOROTHIAZIDE 12.5 MG/1
12.5 TABLET ORAL DAILY
Qty: 90 TABLET | Refills: 5 | Status: SHIPPED | OUTPATIENT
Start: 2020-08-06 | End: 2020-11-02

## 2020-08-06 RX ORDER — LOSARTAN POTASSIUM 50 MG/1
50 TABLET ORAL DAILY
Qty: 30 TABLET | Refills: 5 | Status: SHIPPED | OUTPATIENT
Start: 2020-08-06 | End: 2020-11-02 | Stop reason: DRUGHIGH

## 2020-08-13 ENCOUNTER — TELEPHONE (OUTPATIENT)
Dept: GASTROENTEROLOGY | Facility: CLINIC | Age: 60
End: 2020-08-13

## 2020-08-13 NOTE — TELEPHONE ENCOUNTER
Ms. Albert called and m re: [facility] call she received today. Returned pt call; recording states the number I have dialed is not in service. Will attempt a return call by days end.

## 2020-08-17 ENCOUNTER — TELEPHONE (OUTPATIENT)
Dept: CARDIOLOGY | Facility: CLINIC | Age: 60
End: 2020-08-17

## 2020-08-17 NOTE — TELEPHONE ENCOUNTER
Noted; continue to monitor blood pressure off losartan HCTZ and update us in the next 2 weeks.    Thanks!

## 2020-08-17 NOTE — TELEPHONE ENCOUNTER
Please see patient email.     Medications patient is referring to is losartan 50 mg and HCTZ 12.5 mg.     Please advise.

## 2020-08-17 NOTE — TELEPHONE ENCOUNTER
----- Message from Nancy Albert sent at 8/17/2020  1:03 PM EDT -----  Regarding: RE: Non-Urgent Medical Question  Contact: [phone]  BP Readings  Aug 14 before meds 137/87-67;  144/88-66;  140/90-70  Aug 15 NO MEDS 137/90-72;  129/86-71;  135/89-61  Aug 16 before meds 139/94-64;  146/90-66;  150/94-66  Aug 16 after meds 135/89-64;  147/90-67;  134/85-60  Aug 17 before meds 135/88-66;  136/86-64;  134/88-60    I take 3 readings with 2 - 3 minute wait between each take.  I feel BP has increased since med MG have been decreased. No meds on Aug 15 to see if I would get fatigued, very little fatigue and by late evening feeling fine.  ----- Message -----  From: DEEPTI HUNT  Sent: 8/13/2020  3:10 PM EDT  To: Nancy Albert  Subject: RE: Non-Urgent Medical Question  What has you blood pressure and heart rate been running on these medications?    Prachi Brian RN      ----- Message -----     From: Nancy Albert     Sent: 8/13/2020  1:45 PM EDT       To: Paramjit Beckford MD  Subject: Non-Urgent Medical Question    Been on Losartan 50MG / HCTZ 12.5MG for a week now and I DO NOT fall asleep from fatigue anymore but still have extreme jitteriness and weakness.  I also have dull chest, back and neck pain and muscle weakness in my arms.  Sleep very little due to jitteriness. Is it possible my body in now having a reaction to BP meds; I read losartan/hctz can cause electrolyte imbalance which causes feeling of jitteriness, unsteadiness and severe weakness.  Also read that diuretic can cause weakness in the stomach which causes slow food digestion.  Been dealing with BP and stomach issues so long and trying to narrow down the causes.  Do not like the dull chest and back ache, very scary cause I don't know why I'm having it.

## 2020-09-01 NOTE — PROGRESS NOTES
Subjective:     Encounter Date:09/10/2020      Patient ID: Nancy Ablert is a 59 y.o. single -American female from [place], retired from the [employer].     PHYSICIAN: Franklin Will MD  SLEEP PHYSICIAN: [facility]  REFERRING HEALTHCARE PROVIDER: ANA Theodore  GI PHYSICIAN:  Nicola Mukherjee MD .  HEMATOLOGIST: Bhupendra De La Cruz MD     Chief Complaint   Patient presents with   • Essential hypertension     Problem List:  1. Hypertensive cardiovascular disease:  a. Graded exercise test, 2/11/2016: The maximal exercise stress test negative by ST criteria.  No precordial  symptoms developed.  Normal systolic blood pressure response.  Normal exercise tolerance.  No ventricular ectopy recorded.  Clinical correlation required.  b. Remote 24-hour Holter monitor approximately 2016 with her physician: Normal-data deficit   c. Stress echocardiogram, 10/12/2018: Normal stress echo with no significant echocardiographic evidence for myocardial ischemia, EF 0.56-0.60  d. Holter monitor, 9/6/2018: Abnormal monitor study with one episode of nonsustained V. tach, 6 beats in duration.  Occasional PVCs.  Rare, brief episodes of PAT.  Patient triggered event related was sinus rhythm  e. 24-hour ambulatory blood pressure monitor November 2018: Average blood pressure 100-120/60-70 torr, heart rate 60-70 bpm, maximum blood pressure 160/85  f. Residual class I symptoms with intermittent random atypical chest pain, March 2019, September 2020  2. Hypertension  3. Apparent asymptomatic nonsustained ventricular tachycardia during hypokalemia, incidentally found on Holter monitor, September 2018, [employer] 5/12/2020-5/26/2020: Heart rate ranged between  bpm with average 71 bpm, rare atrial and ventricular ectopy, first-degree AV block intermittently, and 1 run of SVT lasting 9 beats at a maximum of 152 bpm but was asymptomatic.  No evidence of atrial  fibrillation/flutter, V. tach, pauses, or ischemic ST-T changes.  4. Palpitations with Holter Monitor (9/6/2018): Abnormal monitor study with one episode of nonsustained V. tach, 6 beats in duration.  Occasional PVCs.  Rare, brief episodes of PAT, with recent increased severity of palpitation and event monitor completed - data deficit, April 2020   5. Cardiac murmur with acceptable echocardiogram, October 2018  6. At risk for sleep apnea with negative sleep study in 2017-data deficit  7. Acid reflux, diagnosed August 2019, with repeat ED visits winter 2020 with abdominal pain and continued GI evaluation and treatment, April 2020  8. Leukopenia August 2020  9. Surgical history: KATI, left oophorectomy    Allergies   Allergen Reactions   • Beta Adrenergic Blockers GI Intolerance   • Verapamil Hcl Er GI Intolerance         Current Outpatient Medications:   •  Ginger, Zingiber officinalis, (GINGER ROOT) 550 MG capsule, Take 550 mg by mouth 3 (Three) Times a Day Before Meals., Disp: 90 capsule, Rfl: 3  •  hydroCHLOROthiazide (HYDRODIURIL) 12.5 MG tablet, Take 1 tablet by mouth Daily., Disp: 90 tablet, Rfl: 5  •  losartan (COZAAR) 50 MG tablet, Take 1 tablet by mouth Daily., Disp: 30 tablet, Rfl: 5  •  Multiple Vitamins-Minerals (MULTIVITAMIN ADULT PO), Take  by mouth Daily., Disp: , Rfl:   •  traMADol (ULTRAM) 50 MG tablet, Take 1 tablet by mouth Every 6 (Six) Hours As Needed for Moderate Pain  or Severe Pain  for up to 12 doses., Disp: 12 tablet, Rfl: 0    HISTORY OF PRESENT ILLNESS:  The patient is here for a 5-month follow-up.  In the interim she has had a lot of abdominal discomfort and was supposed to have a EGD with [facility] in July 2020 but her insurance would not approve this.  She was a no-show to her 8/27/2020 cardiology appointment.  The patient has had multiple phone calls documented in epic.  She has had concerns regarding calcium channel blockers because she researched and found that it could cause a slow  "stomach emptying and she had questions whether her meds were contributing to her gastroparesis.  Dr. Mukherjee did not think her medicines have caused her delayed emptying and recommended that she follow-up with [facility] in regards to her gastroparesis.  The patient states that she is certain that amlodipine caused her gastroparesis and she does not want to try this medication.  She has had complaints of extreme fatigue and muscle aching/weakness.  One hour after she takes her medications she has some dull chest pain that is sometimes in her shoulders and back.  She denies any abdominal discomfort, nausea, vomiting, constipation, diarrhea, shortness of breath, presyncope, or syncope.  She sometimes has dizziness and a few days ago she had an episode of palpitations that awoke her from her sleep that were very short-lived.  The patient had had difficulties with palpitations when she was on antibiotics for her bacteria overgrowth back in the spring 2020.  The patient is frustrated because she has \"felt bad\" for over a year.  Her blood pressure medications have been altered multiple times and she feels that the increase in losartan to 50 mg daily and 25 mg of hydrochlorothiazide make her GI symptoms worse.  She brought a list of 21 blood pressure medications with varying doses that she has taken over the past few years, some of them have worked and others have not.  She also brought her blood pressure log with her today for review and her blood pressures have been around 150 systolic.  Remotely she wore a 24-hour ambulatory blood pressure monitor.  She had questions regarding a recent ECG and chest x-ray that demonstrated possible LAE and enlarged heart, presumably from uncontrolled hypertension.  Remotely she had a sleep study and was not found to have sleep apnea.  The patient states that she is so fatigued that she wakes up tired and goes to bed tired.  She has had leukopenia and is " "scheduled to see Dr. Garay on 9/28/2020.  She had laboratory testing with her physician last week and felt that she just had a CBC drawn and had no other labs at that time.      Review of Systems   Constitution: Positive for malaise/fatigue.   Cardiovascular: Positive for chest pain and palpitations.   Musculoskeletal: Positive for muscle weakness and myalgias.      All other systems reviewed and otherwise negative.    Procedures       Objective:       Vitals:    09/10/20 1328   BP: 160/82   BP Location: Right arm   Patient Position: Sitting   Cuff Size: Adult   Pulse: 75   Temp: 98 °F (36.7 °C)   SpO2: 98%   Weight: 59.9 kg (132 lb)   Height: 162.6 cm (64\")   Recheck blood pressure right arm sitting was 160/72  Body mass index is 22.66 kg/m².  Last weight April 2020 was 140 pounds  Physical Exam   Constitutional: She is oriented to person, place, and time. She appears well-developed and well-nourished.   Neck: No JVD present. Carotid bruit is not present. No thyromegaly present.   Cardiovascular: Regular rhythm, S1 normal and S2 normal. Exam reveals no gallop, no S3 and no friction rub.   Murmur heard.   Medium-pitched harsh early systolic murmur is present with a grade of 2/6 at the lower left sternal border.  Pulses:       Dorsalis pedis pulses are 2+ on the right side, and 2+ on the left side.        Posterior tibial pulses are 2+ on the right side, and 2+ on the left side.   Pulmonary/Chest: Effort normal and breath sounds normal. She has no wheezes. She has no rhonchi. She has no rales.   Abdominal: Soft. She exhibits no mass. There is no hepatosplenomegaly. There is no tenderness. There is no guarding.   Bowel sounds audible x4   Musculoskeletal: Normal range of motion. She exhibits no edema.   Lymphadenopathy:     She has no cervical adenopathy.   Neurological: She is alert and oriented to person, place, and time.   Skin: Skin is warm, dry and intact. No rash noted.   Vitals reviewed.        Lab Review:   Lab " "Results   Component Value Date    GLUCOSE 99 05/01/2020    BUN 15 05/01/2020    CREATININE 0.71 05/01/2020    EGFRIFAFRI 102 05/01/2020    BCR 21.1 05/01/2020    CO2 28.0 05/01/2020    CALCIUM 9.9 05/01/2020    ALBUMIN 5.40 (H) 05/01/2020    AST 18 05/01/2020    ALT 9 05/01/2020       Lab Results   Component Value Date    WBC 4.08 05/01/2020    HGB 14.2 05/01/2020    HCT 45.9 05/01/2020    MCV 88.8 05/01/2020     05/01/2020     CT abd/pelvis 05/01/2020:  There is a periumbilical hernia containing mesenteric fat  only with small umbilical hernia containing mesenteric fat only. No  acute intra-abdominal or pelvic abnormality is present. The pancreas is  grossly unremarkable with no underlying mass or lesion. No surrounding  inflammatory change is present. Multiple tiny cysts seen throughout the  liver and kidneys.     NM Gastric emptying 5/12/2020:Delayed gastric emptying consistent with gastroparesis        Chest x ray 5/1/2020:  No acute cardiopulmonary disease        Advance Care Planning   ACP discussion was held with the patient during this visit. Patient does not have an advance directive, declines further assistance.  Assessment:     The patient has had some chest pain that she describes as a \"dull sensation\" one hour after she takes her medications in the mornings. I will order a stress echo in view of her marked fatigue, uncontrolled hypertension, and atypical chest discomfort.  She has intolerances to multiple antihypertensive medications and has had multiple medication alterations over the past few months.  I will restart doxazosin 2 mg nightly and the patient will continue to monitor her blood pressure daily.  She was recently found to have leukopenia and has an upcoming appointment with Dr. Garay on 9/28/2020. She has had extreme fatigue and muscle weakness/aches so I will order a ESR, CPK, TSH, vitamin D, CAIN, and RF. She just had a CBC with her physician last week but no results are able to be " reviewed. She has gastroparesis and is followed by gastroenterology who recommended the patient see the [facility].      Diagnosis Plan   1. Palpitations     2. Hypertensive heart disease without heart failure  Sedimentation Rate    CK    TSH    Vitamin D 1,25 Dihydroxy    Adult Stress Echo W/ Cont or Stress Agent if Necessary Per Protocol   3. Essential hypertension  Sedimentation Rate    CK    TSH    Vitamin D 1,25 Dihydroxy    Adult Stress Echo W/ Cont or Stress Agent if Necessary Per Protocol   4. Chest pain, unspecified type  Adult Stress Echo W/ Cont or Stress Agent if Necessary Per Protocol   5. Muscle weakness  Rheumatoid Factor, Quant    CAIN   6. Generalized body aches  Rheumatoid Factor, Quant    ACIN          Plan:         1. Patient to continue current medications and close follow up with the above providers.  2. Tentative cardiology follow up in December 2020 or patient may return sooner PRN.  3. Stress echocardiogram  4. Doxazosin 2 mg nightly  5. Patient to continue monitoring her blood pressure daily and call us in 1-2 weeks with her results  6. ESR, CK, TSH, vitamin D, RF factor, CAIN  7. Patient follow-up with Dr. Garay on 9/28/2020      Electronically signed by ANA Son, 09/10/20, 5:46 PM.

## 2020-09-03 ENCOUNTER — TELEPHONE (OUTPATIENT)
Dept: CARDIOLOGY | Facility: CLINIC | Age: 60
End: 2020-09-03

## 2020-09-03 NOTE — TELEPHONE ENCOUNTER
Patient states that amlodipine previously upset her stomach, pt would like to know if she can increase losartan to 50 mg at this time until next appt?    Please advise.

## 2020-09-03 NOTE — TELEPHONE ENCOUNTER
Noted; would add amlodipine 5 mg daily to current regimen unless this is 1 of her intolerant medications.  We can discuss pursuing diagnostic coronary angiography with her when she returns for scheduled follow-up.      Thanks!

## 2020-09-03 NOTE — TELEPHONE ENCOUNTER
Called pt to give KTS recommendations. Phone rang and then disconnected. Will call again tomorrow morning.

## 2020-09-03 NOTE — TELEPHONE ENCOUNTER
Patient called and stated that she woke up at 5 am feeling weak and shaky.     161/90  80   158/101 80  153/101  73 before medication     151/91  64   141/82  61   142/89  64 after medication     Patient states that about a hour after taking medications she is still having, headaches, chest pain and upper back pain that is intermittent all day. Patient complains of feeling fatigue, weak, shaky, and jittery all day, which she relates to these two medications. Patient has also been struggling with gastroparesis.     Patient has appt 9/10/20 that she is going to keep.    Patient is concerned at this point about heart blockage and might be interested in a stress test, but states that she would not be able to walk on a treadmill.     Pt is currently taking:  Losartan 25 mg daily  HCTZ 12.5 mg daily    Please advise   06-Sep-2018 20:31

## 2020-09-04 NOTE — TELEPHONE ENCOUNTER
Called pt and gave KTS recommendations above. Pt verbalizes understanding and agreeable to plan.

## 2020-09-10 ENCOUNTER — OFFICE VISIT (OUTPATIENT)
Dept: CARDIOLOGY | Facility: CLINIC | Age: 60
End: 2020-09-10

## 2020-09-10 ENCOUNTER — LAB (OUTPATIENT)
Dept: LAB | Facility: HOSPITAL | Age: 60
End: 2020-09-10

## 2020-09-10 VITALS
HEIGHT: 64 IN | OXYGEN SATURATION: 98 % | SYSTOLIC BLOOD PRESSURE: 160 MMHG | TEMPERATURE: 98 F | HEART RATE: 75 BPM | WEIGHT: 132 LBS | BODY MASS INDEX: 22.53 KG/M2 | DIASTOLIC BLOOD PRESSURE: 82 MMHG

## 2020-09-10 DIAGNOSIS — I11.9 HYPERTENSIVE HEART DISEASE WITHOUT HEART FAILURE: ICD-10-CM

## 2020-09-10 DIAGNOSIS — M62.81 MUSCLE WEAKNESS: ICD-10-CM

## 2020-09-10 DIAGNOSIS — I10 ESSENTIAL HYPERTENSION: ICD-10-CM

## 2020-09-10 DIAGNOSIS — R52 GENERALIZED BODY ACHES: ICD-10-CM

## 2020-09-10 DIAGNOSIS — R00.2 PALPITATIONS: Primary | ICD-10-CM

## 2020-09-10 DIAGNOSIS — R07.9 CHEST PAIN, UNSPECIFIED TYPE: ICD-10-CM

## 2020-09-10 LAB
CHROMATIN AB SERPL-ACNC: <10 IU/ML (ref 0–14)
CK SERPL-CCNC: 104 U/L (ref 20–180)
TSH SERPL DL<=0.05 MIU/L-ACNC: 0.43 UIU/ML (ref 0.27–4.2)

## 2020-09-10 PROCEDURE — 82652 VIT D 1 25-DIHYDROXY: CPT

## 2020-09-10 PROCEDURE — 36415 COLL VENOUS BLD VENIPUNCTURE: CPT

## 2020-09-10 PROCEDURE — 82550 ASSAY OF CK (CPK): CPT

## 2020-09-10 PROCEDURE — 86431 RHEUMATOID FACTOR QUANT: CPT

## 2020-09-10 PROCEDURE — 86038 ANTINUCLEAR ANTIBODIES: CPT

## 2020-09-10 PROCEDURE — 99214 OFFICE O/P EST MOD 30 MIN: CPT | Performed by: NURSE PRACTITIONER

## 2020-09-10 PROCEDURE — 85652 RBC SED RATE AUTOMATED: CPT

## 2020-09-10 PROCEDURE — 84443 ASSAY THYROID STIM HORMONE: CPT

## 2020-09-10 RX ORDER — DOXAZOSIN 2 MG/1
2 TABLET ORAL NIGHTLY
Qty: 30 TABLET | Refills: 5 | Status: SHIPPED | OUTPATIENT
Start: 2020-09-10 | End: 2020-11-02

## 2020-09-11 LAB — ERYTHROCYTE [SEDIMENTATION RATE] IN BLOOD: 7 MM/HR (ref 0–30)

## 2020-09-14 LAB — ANA SER QL: NEGATIVE

## 2020-09-15 LAB — 1,25(OH)2D3 SERPL-MCNC: 76.8 PG/ML (ref 19.9–79.3)

## 2020-09-16 ENCOUNTER — DOCUMENTATION (OUTPATIENT)
Dept: CARDIOLOGY | Facility: CLINIC | Age: 60
End: 2020-09-16

## 2020-09-16 NOTE — PROGRESS NOTES
I called and spoke with the patient regarding her laboratory studies which included a CK, sed rate, TSH, CAIN, rheumatoid factor, and vitamin D level which were all within normal limits.  She has an upcoming appointment with Dr. Garay on 9/28/2020 and I encouraged her to follow-up with him for this appointment in regards to her fatigue.  The patient's blood pressures have still been around 145-150 systolic since adding doxazosin 2 mg nightly so I asked the patient to increase her doxazosin to 4 mg nightly and to continue to monitor her blood pressure at home and call us next week with the results.    Electronically signed by ANA Son, 09/16/20, 8:30 AM EDT.

## 2020-09-28 ENCOUNTER — CONSULT (OUTPATIENT)
Dept: ONCOLOGY | Facility: CLINIC | Age: 60
End: 2020-09-28

## 2020-09-28 ENCOUNTER — LAB (OUTPATIENT)
Dept: LAB | Facility: HOSPITAL | Age: 60
End: 2020-09-28

## 2020-09-28 VITALS
WEIGHT: 133 LBS | DIASTOLIC BLOOD PRESSURE: 86 MMHG | OXYGEN SATURATION: 98 % | HEART RATE: 74 BPM | HEIGHT: 64 IN | RESPIRATION RATE: 16 BRPM | TEMPERATURE: 98 F | BODY MASS INDEX: 22.71 KG/M2 | SYSTOLIC BLOOD PRESSURE: 182 MMHG

## 2020-09-28 DIAGNOSIS — D70.8 OTHER NEUTROPENIA (HCC): ICD-10-CM

## 2020-09-28 DIAGNOSIS — R77.8 ELEVATED TOTAL PROTEIN: ICD-10-CM

## 2020-09-28 DIAGNOSIS — R74.8 ELEVATED LIPASE: ICD-10-CM

## 2020-09-28 DIAGNOSIS — D70.8 OTHER NEUTROPENIA (HCC): Primary | ICD-10-CM

## 2020-09-28 DIAGNOSIS — F41.9 ANXIETY: ICD-10-CM

## 2020-09-28 LAB
BASOPHILS # BLD AUTO: 0.02 10*3/MM3 (ref 0–0.2)
BASOPHILS NFR BLD AUTO: 0.4 % (ref 0–1.5)
DEPRECATED RDW RBC AUTO: 39.5 FL (ref 37–54)
EOSINOPHIL # BLD AUTO: 0.11 10*3/MM3 (ref 0–0.4)
EOSINOPHIL NFR BLD AUTO: 2.3 % (ref 0.3–6.2)
ERYTHROCYTE [DISTWIDTH] IN BLOOD BY AUTOMATED COUNT: 12 % (ref 12.3–15.4)
HCT VFR BLD AUTO: 44.6 % (ref 34–46.6)
HGB BLD-MCNC: 13.5 G/DL (ref 12–15.9)
IMM GRANULOCYTES # BLD AUTO: 0.01 10*3/MM3 (ref 0–0.05)
IMM GRANULOCYTES NFR BLD AUTO: 0.2 % (ref 0–0.5)
LIPASE SERPL-CCNC: 127 U/L (ref 13–60)
LYMPHOCYTES # BLD AUTO: 1.87 10*3/MM3 (ref 0.7–3.1)
LYMPHOCYTES NFR BLD AUTO: 39 % (ref 19.6–45.3)
MCH RBC QN AUTO: 27.2 PG (ref 26.6–33)
MCHC RBC AUTO-ENTMCNC: 30.3 G/DL (ref 31.5–35.7)
MCV RBC AUTO: 89.9 FL (ref 79–97)
MONOCYTES # BLD AUTO: 0.23 10*3/MM3 (ref 0.1–0.9)
MONOCYTES NFR BLD AUTO: 4.8 % (ref 5–12)
NEUTROPHILS NFR BLD AUTO: 2.56 10*3/MM3 (ref 1.7–7)
NEUTROPHILS NFR BLD AUTO: 53.3 % (ref 42.7–76)
NRBC BLD AUTO-RTO: 0 /100 WBC (ref 0–0.2)
PLATELET # BLD AUTO: 186 10*3/MM3 (ref 140–450)
PMV BLD AUTO: 11.1 FL (ref 6–12)
RBC # BLD AUTO: 4.96 10*6/MM3 (ref 3.77–5.28)
WBC # BLD AUTO: 4.8 10*3/MM3 (ref 3.4–10.8)

## 2020-09-28 PROCEDURE — 86334 IMMUNOFIX E-PHORESIS SERUM: CPT

## 2020-09-28 PROCEDURE — 83883 ASSAY NEPHELOMETRY NOT SPEC: CPT

## 2020-09-28 PROCEDURE — 84155 ASSAY OF PROTEIN SERUM: CPT

## 2020-09-28 PROCEDURE — 99204 OFFICE O/P NEW MOD 45 MIN: CPT | Performed by: INTERNAL MEDICINE

## 2020-09-28 PROCEDURE — 82784 ASSAY IGA/IGD/IGG/IGM EACH: CPT

## 2020-09-28 PROCEDURE — 84165 PROTEIN E-PHORESIS SERUM: CPT

## 2020-09-28 PROCEDURE — 82607 VITAMIN B-12: CPT

## 2020-09-28 PROCEDURE — 85025 COMPLETE CBC W/AUTO DIFF WBC: CPT

## 2020-09-28 PROCEDURE — 83690 ASSAY OF LIPASE: CPT

## 2020-09-28 PROCEDURE — 83835 ASSAY OF METANEPHRINES: CPT

## 2020-09-28 PROCEDURE — 36415 COLL VENOUS BLD VENIPUNCTURE: CPT

## 2020-09-28 NOTE — PROGRESS NOTES
ID: 59 y.o. year old female from [place] [id number]    PCP: Franklin Will MD    REFERRING PHYSICIAN: Franklin Will MD    Reason for Consultation: Mild neutropenia    Dear Dr. Will    It is a pleasure to meet Ms. Albert today.  She is a very pleasant 59-year-old lady who over the last several months has lost almost 40 pounds presents to me for mild neutropenia.  Though her biggest complaint has been generalized ache and also feeling nervous constantly.  She reports her blood pressures fairly labile.  She has been checked for autoimmune diseases with the CAIN negative and a rheumatoid factor negative.  She has been diagnosed with gastroparesis though she feels like she does not have any of the symptoms to suggest that.  She is able to eat considerable amount of food without having nausea vomiting.  She denies any recent infections.  She does have persistently elevated lipase levels.      Past Medical History:   Diagnosis Date   • Arthritis 2019   • Gastroparesis    • GERD (gastroesophageal reflux disease)    • Heart murmur    • Hypertension        Past Surgical History:   Procedure Laterality Date   • COLONOSCOPY  02/2020   • HYSTERECTOMY  07/15/2008    Partial   • OOPHORECTOMY Left 07/15/2008       Social History     Socioeconomic History   • Marital status: Single     Spouse name: Not on file   • Number of children: Not on file   • Years of education: Not on file   • Highest education level: Not on file   Occupational History   • Occupation: Retired   Tobacco Use   • Smoking status: Never Smoker   • Smokeless tobacco: Never Used   Substance and Sexual Activity   • Alcohol use: No     Frequency: Never   • Drug use: No   • Sexual activity: Defer   Social History Narrative    Caffeine: None    Patient lives at her home with her 11 year old grandson       Family History   Problem Relation Age of Onset   • Cancer Mother    • Pancreatic cancer Mother    • Arthritis Father    • No Known Problems Sister    • No Known  Problems Brother    • No Known Problems Maternal Grandfather    • No Known Problems Paternal Grandmother    • No Known Problems Paternal Grandfather    • No Known Problems Daughter    • Breast cancer Neg Hx    • Ovarian cancer Neg Hx        Review of Systems:    16 point review of systems was performed and reviewed and scanned into the EMR    Review of Systems - Oncology      Current Outpatient Medications:   •  doxazosin (CARDURA) 2 MG tablet, Take 1 tablet by mouth Every Night., Disp: 30 tablet, Rfl: 5  •  Marva, Zingiber officinalis, (MARVA ROOT) 550 MG capsule, Take 550 mg by mouth 3 (Three) Times a Day Before Meals., Disp: 90 capsule, Rfl: 3  •  hydroCHLOROthiazide (HYDRODIURIL) 12.5 MG tablet, Take 1 tablet by mouth Daily., Disp: 90 tablet, Rfl: 5  •  losartan (COZAAR) 50 MG tablet, Take 1 tablet by mouth Daily., Disp: 30 tablet, Rfl: 5  •  Multiple Vitamins-Minerals (MULTIVITAMIN ADULT PO), Take  by mouth Daily., Disp: , Rfl:          Allergies   Allergen Reactions   • Beta Adrenergic Blockers GI Intolerance   • Verapamil Hcl Er GI Intolerance       ECOG SCORE: 1    Objective     Vitals:    09/28/20 1500   BP: (!) 182/86   Pulse: 74   Resp: 16   Temp: 98 °F (36.7 °C)   SpO2: 98%     Body mass index is 23.19 kg/m².  Body surface area is 1.63 meters squared.        09/28/20 1500   Weight: 60.3 kg (133 lb)     Pain Score    09/28/20 1500   PainSc:   3   PainLoc: Abdomen          Physical Exam    General: well appearing, in no acute distress  HEENT: sclera anicteric, oropharynx clear, neck is supple  Lymphatics: no cervical, supraclavicular, or axillary adenopathy  Cardiovascular: regular rate and rhythm, no murmurs, rubs or gallops  Lungs: clear to auscultation bilaterally  Abdomen: soft, nontender, nondistended.  No palpable organomegaly  Extremities: no lower extremity edema  Skin: no rashes, lesions, bruising, or petechiae  Msk:  Shows no weakness of the large muscle groups  Psych: Mood is  stable        Lab Results   Component Value Date    GLUCOSE 99 05/01/2020    BUN 15 05/01/2020    CREATININE 0.71 05/01/2020     05/01/2020    K 4.0 05/01/2020     05/01/2020    CO2 28.0 05/01/2020    CALCIUM 9.9 05/01/2020    PROTEINTOT 8.9 (H) 05/01/2020    ALBUMIN 5.40 (H) 05/01/2020    BILITOT 0.5 05/01/2020    ALKPHOS 66 05/01/2020    AST 18 05/01/2020    ALT 9 05/01/2020       Lab Results   Component Value Date    HGB 13.5 09/28/2020    HCT 44.6 09/28/2020    MCV 89.9 09/28/2020     09/28/2020    WBC 4.80 09/28/2020    NEUTROABS 2.56 09/28/2020    LYMPHSABS 1.87 09/28/2020    MONOSABS 0.23 09/28/2020    EOSABS 0.11 09/28/2020    BASOSABS 0.02 09/28/2020       Lab Results   Component Value Date    LIPASE 127 (H) 09/28/2020    LIPASE 284 (H) 05/01/2020    LIPASE 66 (H) 03/23/2020    LIPASE 59 03/09/2020         Assessment/Plan      1.  Mild neutropenia.  I am going to repeat her labs today and they are completely normal.  Her symptoms are very suggestive of possibly having an underlying pheochromocytoma.  I am going to check metanephrines and 5-hiaa.  She has an elevated lipase level that can go along with any intra-abdominal pathology.  My concern is the 40 pound weight loss that precedes these labs.  She had CAT scans that failed to reveal an obvious source of a malignancy. she has is no enlargement of the adrenal glands.  There is no masses on the kidneys.  At this time it is hard to see what her symptoms are truly related to it.      I spent a total of 45 minutes in direct patient care, greater than 30 minutes (greater than 50%) were spent in coordination of care, and counseling the patient regarding  Neutropenia . Answered any questions patient had with medication and plan.          Thank you for allowing me to participate in the care of this patient.    Yours sincerely,    Bhupendra De La Cruz MD  [facility]  Hematology and Oncology    Return on: 12/21/20  Return in  (Approximately): 3 months    Orders Placed This Encounter   Procedures   • Vitamin B12     Standing Status:   Future     Number of Occurrences:   1     Standing Expiration Date:   9/28/2021   • Metanephrines, Frac. Free, Plasma     Standing Status:   Future     Number of Occurrences:   1     Standing Expiration Date:   9/28/2021   • 5 HIAA, Urine, Quantitative, Random - Urine, Clean Catch     Standing Status:   Future     Standing Expiration Date:   9/28/2021   • Lipase     Standing Status:   Future     Number of Occurrences:   1     Standing Expiration Date:   9/28/2021   • LAURA, PE & Free LT Chains, Ser     Standing Status:   Future     Number of Occurrences:   1     Standing Expiration Date:   9/28/2021   • CBC & Differential     Standing Status:   Future     Number of Occurrences:   1     Standing Expiration Date:   9/28/2021     Order Specific Question:   Manual Differential     Answer:   No   • CBC & Differential     Standing Status:   Future     Standing Expiration Date:   9/28/2021     Order Specific Question:   Manual Differential     Answer:   No

## 2020-09-29 LAB — VIT B12 BLD-MCNC: 942 PG/ML (ref 211–946)

## 2020-10-01 LAB
ALBUMIN SERPL ELPH-MCNC: 3.9 G/DL (ref 2.9–4.4)
ALBUMIN/GLOB SERPL: 1.1 {RATIO} (ref 0.7–1.7)
ALPHA1 GLOB SERPL ELPH-MCNC: 0.3 G/DL (ref 0–0.4)
ALPHA2 GLOB SERPL ELPH-MCNC: 1 G/DL (ref 0.4–1)
B-GLOBULIN SERPL ELPH-MCNC: 1.2 G/DL (ref 0.7–1.3)
GAMMA GLOB SERPL ELPH-MCNC: 1 G/DL (ref 0.4–1.8)
GLOBULIN SER-MCNC: 3.6 G/DL (ref 2.2–3.9)
IGA SERPL-MCNC: 318 MG/DL (ref 87–352)
IGG SERPL-MCNC: 1061 MG/DL (ref 586–1602)
IGM SERPL-MCNC: 54 MG/DL (ref 26–217)
INTERPRETATION SERPL IEP-IMP: NORMAL
KAPPA LC FREE SER-MCNC: 17.6 MG/L (ref 3.3–19.4)
KAPPA LC FREE/LAMBDA FREE SER: 1.38 {RATIO} (ref 0.26–1.65)
LABORATORY COMMENT REPORT: NORMAL
LAMBDA LC FREE SERPL-MCNC: 12.8 MG/L (ref 5.7–26.3)
M PROTEIN SERPL ELPH-MCNC: NORMAL G/DL
PROT SERPL-MCNC: 7.5 G/DL (ref 6–8.5)

## 2020-10-02 ENCOUNTER — APPOINTMENT (OUTPATIENT)
Dept: PREADMISSION TESTING | Facility: HOSPITAL | Age: 60
End: 2020-10-02

## 2020-10-02 ENCOUNTER — LAB (OUTPATIENT)
Dept: LAB | Facility: HOSPITAL | Age: 60
End: 2020-10-02

## 2020-10-02 DIAGNOSIS — F41.9 ANXIETY: ICD-10-CM

## 2020-10-02 PROCEDURE — 82570 ASSAY OF URINE CREATININE: CPT

## 2020-10-02 PROCEDURE — 83497 ASSAY OF 5-HIAA: CPT

## 2020-10-02 PROCEDURE — C9803 HOPD COVID-19 SPEC COLLECT: HCPCS

## 2020-10-02 PROCEDURE — U0004 COV-19 TEST NON-CDC HGH THRU: HCPCS

## 2020-10-03 LAB — SARS-COV-2 RNA NOSE QL NAA+PROBE: NOT DETECTED

## 2020-10-05 ENCOUNTER — HOSPITAL ENCOUNTER (OUTPATIENT)
Dept: CARDIOLOGY | Facility: HOSPITAL | Age: 60
Discharge: HOME | End: 2020-10-05
Admitting: NURSE PRACTITIONER

## 2020-10-05 VITALS — BODY MASS INDEX: 23.57 KG/M2 | WEIGHT: 133 LBS | HEIGHT: 63 IN

## 2020-10-05 DIAGNOSIS — I11.9 HYPERTENSIVE HEART DISEASE WITHOUT HEART FAILURE: ICD-10-CM

## 2020-10-05 DIAGNOSIS — R07.9 CHEST PAIN, UNSPECIFIED TYPE: ICD-10-CM

## 2020-10-05 DIAGNOSIS — I10 ESSENTIAL HYPERTENSION: ICD-10-CM

## 2020-10-05 LAB
BH CV ECHO MEAS - BSA(HAYCOCK): 1.6 M^2
BH CV ECHO MEAS - BSA: 1.6 M^2
BH CV ECHO MEAS - BZI_BMI: 23.6 KILOGRAMS/M^2
BH CV ECHO MEAS - BZI_METRIC_HEIGHT: 160 CM
BH CV ECHO MEAS - BZI_METRIC_WEIGHT: 60.3 KG
BH CV ECHO MEAS - EDV(CUBED): 79.2 ML
BH CV ECHO MEAS - EDV(TEICH): 82.8 ML
BH CV ECHO MEAS - EF(CUBED): 72.2 %
BH CV ECHO MEAS - EF(TEICH): 64.2 %
BH CV ECHO MEAS - ESV(CUBED): 22.1 ML
BH CV ECHO MEAS - ESV(TEICH): 29.7 ML
BH CV ECHO MEAS - FS: 34.7 %
BH CV ECHO MEAS - IVS/LVPW: 1
BH CV ECHO MEAS - IVSD: 1.1 CM
BH CV ECHO MEAS - LV MASS(C)D: 154.9 GRAMS
BH CV ECHO MEAS - LV MASS(C)DI: 95.2 GRAMS/M^2
BH CV ECHO MEAS - LVIDD: 4.3 CM
BH CV ECHO MEAS - LVIDS: 2.8 CM
BH CV ECHO MEAS - LVPWD: 1.1 CM
BH CV ECHO MEAS - RAP SYSTOLE: 3 MMHG
BH CV ECHO MEAS - RVSP: 27 MMHG
BH CV ECHO MEAS - SI(CUBED): 35.1 ML/M^2
BH CV ECHO MEAS - SI(TEICH): 32.7 ML/M^2
BH CV ECHO MEAS - SV(CUBED): 57.1 ML
BH CV ECHO MEAS - SV(TEICH): 53.1 ML
BH CV ECHO MEAS - TR MAX PG: 24 MMHG
BH CV ECHO MEAS - TR MAX VEL: 244.3 CM/SEC
BH CV STRESS BP STAGE 1: NORMAL
BH CV STRESS BP STAGE 2: NORMAL
BH CV STRESS DURATION MIN STAGE 1: 3
BH CV STRESS DURATION MIN STAGE 2: 3
BH CV STRESS DURATION MIN STAGE 3: 0
BH CV STRESS DURATION SEC STAGE 1: 0
BH CV STRESS DURATION SEC STAGE 2: 0
BH CV STRESS DURATION SEC STAGE 3: 27
BH CV STRESS ECHO POST STRESS EJECTION FRACTION EF: 68 %
BH CV STRESS GRADE STAGE 1: 10
BH CV STRESS GRADE STAGE 2: 12
BH CV STRESS GRADE STAGE 3: 14
BH CV STRESS HR STAGE 1: 126
BH CV STRESS HR STAGE 2: 151
BH CV STRESS HR STAGE 3: 157
BH CV STRESS METS STAGE 1: 5
BH CV STRESS METS STAGE 2: 7.5
BH CV STRESS METS STAGE 3: 10
BH CV STRESS O2 STAGE 1: 98
BH CV STRESS O2 STAGE 2: 98
BH CV STRESS O2 STAGE 3: 98
BH CV STRESS PROTOCOL 1: NORMAL
BH CV STRESS RECOVERY BP: NORMAL MMHG
BH CV STRESS RECOVERY HR: 90 BPM
BH CV STRESS RECOVERY O2: 97 %
BH CV STRESS SPEED STAGE 1: 1.7
BH CV STRESS SPEED STAGE 2: 2.5
BH CV STRESS SPEED STAGE 3: 3.4
BH CV STRESS STAGE 1: 1
BH CV STRESS STAGE 2: 2
BH CV STRESS STAGE 3: 3
BH CV VAS BP RIGHT ARM: NORMAL MMHG
LV EF 2D ECHO EST: 65 %
METANEPH FREE SERPL-MCNC: <10 PG/ML (ref 0–88)
NORMETANEPHRINE SERPL-MCNC: 48.2 PG/ML (ref 0–191.8)
PERCENT MAX PREDICTED HR: 105.63 %
STRESS BASELINE BP: NORMAL MMHG
STRESS BASELINE HR: 68 BPM
STRESS O2 SAT REST: 99 %
STRESS PERCENT HR: 124 %
STRESS POST ESTIMATED WORKLOAD: 8.3 METS
STRESS POST EXERCISE DUR MIN: 6 MIN
STRESS POST EXERCISE DUR SEC: 27 SEC
STRESS POST O2 SAT PEAK: 97 %
STRESS POST PEAK BP: NORMAL MMHG
STRESS POST PEAK HR: 169 BPM

## 2020-10-05 PROCEDURE — 93017 CV STRESS TEST TRACING ONLY: CPT

## 2020-10-05 PROCEDURE — 93350 STRESS TTE ONLY: CPT

## 2020-10-05 PROCEDURE — 93352 ADMIN ECG CONTRAST AGENT: CPT | Performed by: INTERNAL MEDICINE

## 2020-10-05 PROCEDURE — 93018 CV STRESS TEST I&R ONLY: CPT | Performed by: INTERNAL MEDICINE

## 2020-10-05 PROCEDURE — 93350 STRESS TTE ONLY: CPT | Performed by: INTERNAL MEDICINE

## 2020-10-05 PROCEDURE — 25010000002 SULFUR HEXAFLUORIDE MICROSPH 60.7-25 MG RECONSTITUTED SUSPENSION: Performed by: NURSE PRACTITIONER

## 2020-10-05 RX ADMIN — SULFUR HEXAFLUORIDE 5 ML: KIT at 15:39

## 2020-10-11 LAB
5OH-INDOLEACETATE UR-MCNC: 2.4 MG/L
5OH-INDOLEACETATE/CREAT UR: 2.6 MG/G CREAT (ref 0–6.9)
CREAT UR-MCNC: 93.3 MG/DL

## 2020-10-13 ENCOUNTER — OFFICE VISIT (OUTPATIENT)
Dept: GASTROENTEROLOGY | Facility: CLINIC | Age: 60
End: 2020-10-13

## 2020-10-13 VITALS
TEMPERATURE: 97.7 F | RESPIRATION RATE: 18 BRPM | HEIGHT: 63 IN | HEART RATE: 68 BPM | SYSTOLIC BLOOD PRESSURE: 136 MMHG | BODY MASS INDEX: 23.39 KG/M2 | OXYGEN SATURATION: 98 % | WEIGHT: 132 LBS | DIASTOLIC BLOOD PRESSURE: 82 MMHG

## 2020-10-13 DIAGNOSIS — R10.9 CHRONIC ABDOMINAL PAIN: Primary | ICD-10-CM

## 2020-10-13 DIAGNOSIS — G89.29 CHRONIC ABDOMINAL PAIN: Primary | ICD-10-CM

## 2020-10-13 PROCEDURE — 99214 OFFICE O/P EST MOD 30 MIN: CPT | Performed by: INTERNAL MEDICINE

## 2020-10-13 NOTE — PROGRESS NOTES
PCP: Franklin Will MD    Chief Complaint   Patient presents with   • 6 month f/u       History of Present Illness:   Nancy Albert is a 59 y.o. female who presents to GI clinic as a follow up for gerd, gastroparesis, chronic abdominal pain, lipase elevation. Feels improved. Has achy abdominal pain that can last hours if she eats a large volume of food. She has decided to reduce her bp medication and notes improvement in dizziness. She has not told Dr. Beckford of the changes.    Past Medical History:   Diagnosis Date   • Arthritis 2019   • Gastroparesis    • GERD (gastroesophageal reflux disease)    • Heart murmur    • Hypertension        Past Surgical History:   Procedure Laterality Date   • COLONOSCOPY  02/2020   • HYSTERECTOMY  07/15/2008    Partial   • OOPHORECTOMY Left 07/15/2008         Current Outpatient Medications:   •  doxazosin (CARDURA) 2 MG tablet, Take 1 tablet by mouth Every Night., Disp: 30 tablet, Rfl: 5  •  Marva, Zingiber officinalis, (MARVA ROOT) 550 MG capsule, Take 550 mg by mouth 3 (Three) Times a Day Before Meals., Disp: 90 capsule, Rfl: 3  •  hydroCHLOROthiazide (HYDRODIURIL) 12.5 MG tablet, Take 1 tablet by mouth Daily., Disp: 90 tablet, Rfl: 5  •  losartan (COZAAR) 50 MG tablet, Take 1 tablet by mouth Daily., Disp: 30 tablet, Rfl: 5  •  Multiple Vitamins-Minerals (MULTIVITAMIN ADULT PO), Take  by mouth Daily., Disp: , Rfl:     Allergies   Allergen Reactions   • Beta Adrenergic Blockers GI Intolerance   • Verapamil Hcl Er GI Intolerance       Family History   Problem Relation Age of Onset   • Cancer Mother    • Pancreatic cancer Mother    • Arthritis Father    • No Known Problems Sister    • No Known Problems Brother    • No Known Problems Maternal Grandfather    • No Known Problems Paternal Grandmother    • No Known Problems Paternal Grandfather    • No Known Problems Daughter    • Breast cancer Neg Hx    • Ovarian cancer Neg Hx        Social History     Socioeconomic History   •  Marital status: Single     Spouse name: Not on file   • Number of children: Not on file   • Years of education: Not on file   • Highest education level: Not on file   Occupational History   • Occupation: Retired   Tobacco Use   • Smoking status: Never Smoker   • Smokeless tobacco: Never Used   Substance and Sexual Activity   • Alcohol use: No     Frequency: Never   • Drug use: No   • Sexual activity: Defer   Social History Narrative    Caffeine: None    Patient lives at her home with her 11 year old grandson       Review of Systems   Constitutional: Negative for fever.   Gastrointestinal: Positive for abdominal pain. Negative for constipation, diarrhea, nausea and vomiting.   Genitourinary: Negative for dysuria, frequency and hematuria.   Musculoskeletal: Positive for myalgias. Negative for arthralgias.   Neurological: Positive for headaches.   All other systems reviewed and are negative.        There were no vitals filed for this visit.    Physical Exam  General: well developed, well nourished  A+O x 3 NAD  HEENT: NCAT, pupils equal appearing, sclera appear white  NECK: full ROM  Respiratory: symmetric chest rise, normal effort, normal work of breathing, no overt rales  Abomen: non-distended  Skin: normal color, no jaundice  Neuro: no tremor, no facial droop  Psych: normal mood and affect      Assessment/Plan  1.) Chronic abdominal pain  2.) Chronic nausea, delayed GES 4/2020  3.) atypical chest pain  4.) constipation, now on miralax  5.) Diffuse symptoms suggestive of somatization: paresthesias, dizziness  6.) Recent ed visit  7.) refusing certain diagnostic tests and medical therapies  8.) many intolerances to medications  9.) Lipase elevation 5/1/2020  10.) fatigue  Background: Workup: egd/colonoscopy. Ct a/p x 2 (ed), GES with delay  She is off nexium because she felt worse. ppis have not helped  She did not like levsin or the way it made her feel  She does not want to try reglan due to side effect  She does  not want to do WCE    Plan:  She is improved on gastroparesis diet. She does not want to go to [facility] due to long distance driving. Will continue alec root. She can use miralax for any constipation and I advised her on how to take. I also advised her to inform her cardiologist what she has decided to do with regards to her blood pressure.      rtc 6 months      Nicola Mukherjee MD  10/13/2020        Answers for HPI/ROS submitted by the patient on 10/8/2020   Abdominal pain  What is the primary reason for your visit?: Abdominal Pain  Chronicity: recurrent  Onset: more than 1 year ago  Onset quality: gradual  Frequency: daily  Episode duration: 7 hours  Progression since onset: waxing and waning  Pain location: generalized abdominal region  Pain - numeric: 3/10  Pain quality: dull  Radiates to: suprapubic region  anorexia: No  belching: Yes  flatus: No  hematochezia: No  melena: No  weight loss: Yes  Aggravated by: nothing  Relieved by: eating, movement

## 2020-11-02 RX ORDER — LOSARTAN POTASSIUM 50 MG/1
50 TABLET ORAL 2 TIMES DAILY
Status: ON HOLD
Start: 2020-11-02 | End: 2020-11-23 | Stop reason: SDUPTHER

## 2020-11-02 NOTE — TELEPHONE ENCOUNTER
Called pt and gave KTS recommendations above.     Pt agreeable to referral.    Pt has concerns with continuing doxazosin. Pt states that about 1 hour after she takes doxazosin she gets ache in the middle of her chest that last about an hour. Patient states that pain level and duration is increasing the longer she takes the medication.    Pt currently taking:  Doxazosin 1 mg daily at night  Losartan 25 mg daily in AM    Please advise.

## 2020-11-02 NOTE — TELEPHONE ENCOUNTER
Noted; would increase losartan to 50 mg twice daily and hold for systolic blood pressure less than 140 torr if doxazosin is discontinued.    Thanks!

## 2020-11-02 NOTE — TELEPHONE ENCOUNTER
Called pt and gave KTS recommendations above. Pt verbalizes understanding and agreeable to plan.    Referral sent to [facility].

## 2020-11-17 DIAGNOSIS — I20.0 UNSTABLE ANGINA (HCC): Primary | ICD-10-CM

## 2020-11-17 RX ORDER — ISOSORBIDE MONONITRATE 30 MG/1
30 TABLET, EXTENDED RELEASE ORAL DAILY
Qty: 90 TABLET | Refills: 1 | Status: SHIPPED | OUTPATIENT
Start: 2020-11-17 | End: 2020-11-23 | Stop reason: HOSPADM

## 2020-11-17 RX ORDER — ASPIRIN 81 MG/1
81 TABLET ORAL DAILY
Qty: 90 TABLET | Refills: 3 | Status: SHIPPED | OUTPATIENT
Start: 2020-11-17 | End: 2020-11-23 | Stop reason: HOSPADM

## 2020-11-17 RX ORDER — NITROGLYCERIN 0.4 MG/1
TABLET SUBLINGUAL
Qty: 25 TABLET | Refills: 11 | Status: SHIPPED | OUTPATIENT
Start: 2020-11-17

## 2020-11-17 NOTE — TELEPHONE ENCOUNTER
Noted; would still recommend losartan 50 mg twice daily and add Imdur 30 mg daily as well as aspirin 81 mg daily and NTG as needed and schedule for left heart cath plus or minus cath-based intervention.  In view of rest chest pain she classifies as unstable angina pectoris.    Thanks!

## 2020-11-17 NOTE — TELEPHONE ENCOUNTER
"Pt is calling with chest pain described as a dull ache in neck, shoulder, shoulder blades, and back associated symptoms of weakness x2 weeks and getting worse. Wakes her up at night- doesn't cause her any SOB. She describes it as \"uncomfortable and scary\". BP readings over the last week are as follows-    11/10- 152/98  11/11- 149/90   11/12- 152/81   11/13- 149/85   11/14- 152/83  11/15- 158/84  11/16- 158/86     HR 60-70    The patient was instructed to increase Losartan to 50 mg BID on 11/2 and the pt tells me she never started this because 100 mg of Losartan \"makes her sick\". She informed me that she was once on 100 mg dose previously and had to reduce it back to 50 mg.     Pt is currently only taking Losartan 50 mg daily, Marva root, multi vitamin and a probiotic every other day.   Please advise? :)   "

## 2020-11-17 NOTE — TELEPHONE ENCOUNTER
Spoke with pt regarding KTS recommendations above. She is a little apprehensive about the LHC, however she is agreeable. I don't think she is going to take the Losartan twice daily, although I did specify this was his recommendation. Advised I would call in Imdur, nitro, and asa to [facility] as requested by pt. Advised we will call to schedule LHC. We went over heart cath procedure together as well and answered all questions at this time.

## 2020-11-18 ENCOUNTER — PREP FOR SURGERY (OUTPATIENT)
Dept: OTHER | Facility: HOSPITAL | Age: 60
End: 2020-11-18

## 2020-11-18 DIAGNOSIS — I20.0 UNSTABLE ANGINA (HCC): Primary | ICD-10-CM

## 2020-11-18 RX ORDER — NITROGLYCERIN 0.4 MG/1
0.4 TABLET SUBLINGUAL
Status: CANCELLED | OUTPATIENT
Start: 2020-11-18

## 2020-11-18 RX ORDER — ACETAMINOPHEN 325 MG/1
650 TABLET ORAL EVERY 4 HOURS PRN
Status: CANCELLED | OUTPATIENT
Start: 2020-11-18

## 2020-11-18 RX ORDER — SODIUM CHLORIDE 0.9 % (FLUSH) 0.9 %
10 SYRINGE (ML) INJECTION AS NEEDED
Status: CANCELLED | OUTPATIENT
Start: 2020-11-18

## 2020-11-18 RX ORDER — SODIUM CHLORIDE 0.9 % (FLUSH) 0.9 %
3 SYRINGE (ML) INJECTION EVERY 12 HOURS SCHEDULED
Status: CANCELLED | OUTPATIENT
Start: 2020-11-18

## 2020-11-20 ENCOUNTER — APPOINTMENT (OUTPATIENT)
Dept: PREADMISSION TESTING | Facility: HOSPITAL | Age: 60
End: 2020-11-20

## 2020-11-20 PROCEDURE — U0004 COV-19 TEST NON-CDC HGH THRU: HCPCS

## 2020-11-20 PROCEDURE — C9803 HOPD COVID-19 SPEC COLLECT: HCPCS

## 2020-11-21 LAB — SARS-COV-2 RNA RESP QL NAA+PROBE: NOT DETECTED

## 2020-11-23 ENCOUNTER — HOSPITAL ENCOUNTER (OUTPATIENT)
Facility: HOSPITAL | Age: 60
Setting detail: HOSPITAL OUTPATIENT SURGERY
Discharge: HOME | End: 2020-11-23
Attending: INTERNAL MEDICINE | Admitting: INTERNAL MEDICINE

## 2020-11-23 VITALS
OXYGEN SATURATION: 99 % | DIASTOLIC BLOOD PRESSURE: 83 MMHG | HEIGHT: 64 IN | BODY MASS INDEX: 23 KG/M2 | RESPIRATION RATE: 18 BRPM | TEMPERATURE: 97.8 F | HEART RATE: 67 BPM | SYSTOLIC BLOOD PRESSURE: 150 MMHG | WEIGHT: 134.7 LBS

## 2020-11-23 DIAGNOSIS — I10 ESSENTIAL HYPERTENSION: Primary | ICD-10-CM

## 2020-11-23 DIAGNOSIS — R07.9 CHEST PAIN WITH NORMAL ANGIOGRAPHY: ICD-10-CM

## 2020-11-23 DIAGNOSIS — I20.0 UNSTABLE ANGINA (HCC): ICD-10-CM

## 2020-11-23 PROBLEM — I11.9 HYPERTENSIVE HEART DISEASE WITHOUT HEART FAILURE: Status: RESOLVED | Noted: 2019-01-02 | Resolved: 2020-11-23

## 2020-11-23 PROBLEM — R01.1 HEART MURMUR: Status: RESOLVED | Noted: 2018-09-10 | Resolved: 2020-11-23

## 2020-11-23 PROBLEM — R10.9 ABDOMINAL PAIN: Status: RESOLVED | Noted: 2020-07-08 | Resolved: 2020-11-23

## 2020-11-23 PROBLEM — I47.29 NSVT (NONSUSTAINED VENTRICULAR TACHYCARDIA) (HCC): Status: RESOLVED | Noted: 2018-10-04 | Resolved: 2020-11-23

## 2020-11-23 LAB
ALBUMIN SERPL-MCNC: 4.6 G/DL (ref 3.5–5.2)
ALBUMIN/GLOB SERPL: 1.8 G/DL
ALP SERPL-CCNC: 71 U/L (ref 39–117)
ALT SERPL W P-5'-P-CCNC: 9 U/L (ref 1–33)
ANION GAP SERPL CALCULATED.3IONS-SCNC: 13 MMOL/L (ref 5–15)
AST SERPL-CCNC: 16 U/L (ref 1–32)
BASOPHILS # BLD AUTO: 0.03 10*3/MM3 (ref 0–0.2)
BASOPHILS NFR BLD AUTO: 0.6 % (ref 0–1.5)
BILIRUB SERPL-MCNC: 0.7 MG/DL (ref 0–1.2)
BUN SERPL-MCNC: 16 MG/DL (ref 8–23)
BUN/CREAT SERPL: 22.5 (ref 7–25)
CALCIUM SPEC-SCNC: 9.9 MG/DL (ref 8.6–10.5)
CHLORIDE SERPL-SCNC: 103 MMOL/L (ref 98–107)
CHOLEST SERPL-MCNC: 187 MG/DL (ref 0–200)
CO2 SERPL-SCNC: 25 MMOL/L (ref 22–29)
CREAT SERPL-MCNC: 0.71 MG/DL (ref 0.57–1)
DEPRECATED RDW RBC AUTO: 38.4 FL (ref 37–54)
EOSINOPHIL # BLD AUTO: 0.06 10*3/MM3 (ref 0–0.4)
EOSINOPHIL NFR BLD AUTO: 1.1 % (ref 0.3–6.2)
ERYTHROCYTE [DISTWIDTH] IN BLOOD BY AUTOMATED COUNT: 11.9 % (ref 12.3–15.4)
GFR SERPL CREATININE-BSD FRML MDRD: 101 ML/MIN/1.73
GLOBULIN UR ELPH-MCNC: 2.6 GM/DL
GLUCOSE SERPL-MCNC: 100 MG/DL (ref 65–99)
HBA1C MFR BLD: 5.2 % (ref 4.8–5.6)
HCT VFR BLD AUTO: 44.2 % (ref 34–46.6)
HDLC SERPL-MCNC: 70 MG/DL (ref 40–60)
HGB BLD-MCNC: 14.1 G/DL (ref 12–15.9)
IMM GRANULOCYTES # BLD AUTO: 0.01 10*3/MM3 (ref 0–0.05)
IMM GRANULOCYTES NFR BLD AUTO: 0.2 % (ref 0–0.5)
LDLC SERPL CALC-MCNC: 104 MG/DL (ref 0–100)
LDLC/HDLC SERPL: 1.48 {RATIO}
LYMPHOCYTES # BLD AUTO: 1.23 10*3/MM3 (ref 0.7–3.1)
LYMPHOCYTES NFR BLD AUTO: 23.5 % (ref 19.6–45.3)
MCH RBC QN AUTO: 28.3 PG (ref 26.6–33)
MCHC RBC AUTO-ENTMCNC: 31.9 G/DL (ref 31.5–35.7)
MCV RBC AUTO: 88.6 FL (ref 79–97)
MONOCYTES # BLD AUTO: 0.28 10*3/MM3 (ref 0.1–0.9)
MONOCYTES NFR BLD AUTO: 5.3 % (ref 5–12)
NEUTROPHILS NFR BLD AUTO: 3.63 10*3/MM3 (ref 1.7–7)
NEUTROPHILS NFR BLD AUTO: 69.3 % (ref 42.7–76)
NRBC BLD AUTO-RTO: 0 /100 WBC (ref 0–0.2)
PLAT MORPH BLD: NORMAL
PLATELET # BLD AUTO: 147 10*3/MM3 (ref 140–450)
PMV BLD AUTO: 11.3 FL (ref 6–12)
POTASSIUM SERPL-SCNC: 3.8 MMOL/L (ref 3.5–5.2)
PROT SERPL-MCNC: 7.2 G/DL (ref 6–8.5)
RBC # BLD AUTO: 4.99 10*6/MM3 (ref 3.77–5.28)
RBC MORPH BLD: NORMAL
SODIUM SERPL-SCNC: 141 MMOL/L (ref 136–145)
TRIGL SERPL-MCNC: 68 MG/DL (ref 0–150)
VLDLC SERPL-MCNC: 13 MG/DL (ref 5–40)
WBC # BLD AUTO: 5.24 10*3/MM3 (ref 3.4–10.8)
WBC MORPH BLD: NORMAL

## 2020-11-23 PROCEDURE — 83036 HEMOGLOBIN GLYCOSYLATED A1C: CPT | Performed by: NURSE PRACTITIONER

## 2020-11-23 PROCEDURE — 93458 L HRT ARTERY/VENTRICLE ANGIO: CPT | Performed by: INTERNAL MEDICINE

## 2020-11-23 PROCEDURE — C1894 INTRO/SHEATH, NON-LASER: HCPCS | Performed by: INTERNAL MEDICINE

## 2020-11-23 PROCEDURE — 80061 LIPID PANEL: CPT | Performed by: NURSE PRACTITIONER

## 2020-11-23 PROCEDURE — 0 IOPAMIDOL PER 1 ML: Performed by: INTERNAL MEDICINE

## 2020-11-23 PROCEDURE — 80053 COMPREHEN METABOLIC PANEL: CPT | Performed by: NURSE PRACTITIONER

## 2020-11-23 PROCEDURE — 85007 BL SMEAR W/DIFF WBC COUNT: CPT | Performed by: NURSE PRACTITIONER

## 2020-11-23 PROCEDURE — 25010000002 FENTANYL CITRATE (PF) 100 MCG/2ML SOLUTION: Performed by: INTERNAL MEDICINE

## 2020-11-23 PROCEDURE — 85025 COMPLETE CBC W/AUTO DIFF WBC: CPT | Performed by: NURSE PRACTITIONER

## 2020-11-23 PROCEDURE — 25010000002 HEPARIN (PORCINE) PER 1000 UNITS: Performed by: INTERNAL MEDICINE

## 2020-11-23 PROCEDURE — S0260 H&P FOR SURGERY: HCPCS | Performed by: NURSE PRACTITIONER

## 2020-11-23 PROCEDURE — 25010000002 MIDAZOLAM PER 1 MG: Performed by: INTERNAL MEDICINE

## 2020-11-23 PROCEDURE — C1769 GUIDE WIRE: HCPCS | Performed by: INTERNAL MEDICINE

## 2020-11-23 RX ORDER — DILTIAZEM HYDROCHLORIDE 120 MG/1
120 CAPSULE, EXTENDED RELEASE ORAL DAILY
Qty: 90 CAPSULE | Refills: 3 | Status: SHIPPED | OUTPATIENT
Start: 2020-11-23 | End: 2020-11-24 | Stop reason: SDUPTHER

## 2020-11-23 RX ORDER — ACETAMINOPHEN 325 MG/1
650 TABLET ORAL EVERY 4 HOURS PRN
Status: DISCONTINUED | OUTPATIENT
Start: 2020-11-23 | End: 2020-11-23 | Stop reason: HOSPADM

## 2020-11-23 RX ORDER — SODIUM CHLORIDE 0.9 % (FLUSH) 0.9 %
3 SYRINGE (ML) INJECTION EVERY 12 HOURS SCHEDULED
Status: DISCONTINUED | OUTPATIENT
Start: 2020-11-23 | End: 2020-11-23 | Stop reason: HOSPADM

## 2020-11-23 RX ORDER — SODIUM CHLORIDE 9 MG/ML
3 INJECTION, SOLUTION INTRAVENOUS CONTINUOUS
Status: ACTIVE | OUTPATIENT
Start: 2020-11-23 | End: 2020-11-23

## 2020-11-23 RX ORDER — SODIUM CHLORIDE 0.9 % (FLUSH) 0.9 %
10 SYRINGE (ML) INJECTION AS NEEDED
Status: DISCONTINUED | OUTPATIENT
Start: 2020-11-23 | End: 2020-11-23 | Stop reason: HOSPADM

## 2020-11-23 RX ORDER — FENTANYL CITRATE 50 UG/ML
INJECTION, SOLUTION INTRAMUSCULAR; INTRAVENOUS AS NEEDED
Status: DISCONTINUED | OUTPATIENT
Start: 2020-11-23 | End: 2020-11-23 | Stop reason: HOSPADM

## 2020-11-23 RX ORDER — LIDOCAINE HYDROCHLORIDE 10 MG/ML
INJECTION, SOLUTION EPIDURAL; INFILTRATION; INTRACAUDAL; PERINEURAL AS NEEDED
Status: DISCONTINUED | OUTPATIENT
Start: 2020-11-23 | End: 2020-11-23 | Stop reason: HOSPADM

## 2020-11-23 RX ORDER — MIDAZOLAM HYDROCHLORIDE 1 MG/ML
INJECTION INTRAMUSCULAR; INTRAVENOUS AS NEEDED
Status: DISCONTINUED | OUTPATIENT
Start: 2020-11-23 | End: 2020-11-23 | Stop reason: HOSPADM

## 2020-11-23 RX ORDER — NITROGLYCERIN 0.4 MG/1
0.4 TABLET SUBLINGUAL
Status: DISCONTINUED | OUTPATIENT
Start: 2020-11-23 | End: 2020-11-23 | Stop reason: HOSPADM

## 2020-11-23 RX ORDER — SPIRONOLACTONE 25 MG/1
25 TABLET ORAL DAILY
Qty: 30 TABLET | Refills: 11 | Status: SHIPPED | OUTPATIENT
Start: 2020-11-23 | End: 2020-11-24 | Stop reason: SDUPTHER

## 2020-11-23 RX ORDER — LOSARTAN POTASSIUM 25 MG/1
25 TABLET ORAL DAILY
Qty: 90 TABLET | Refills: 1 | Status: SHIPPED | OUTPATIENT
Start: 2020-11-23 | End: 2020-11-24 | Stop reason: SDUPTHER

## 2020-11-23 RX ADMIN — SODIUM CHLORIDE 3 ML/KG/HR: 9 INJECTION, SOLUTION INTRAVENOUS at 09:21

## 2020-11-23 NOTE — H&P
Pre-Cardiac Catheterization Report  Cardiovascular Laboratory  [facility]      Patient:  Nancy Albert  :  1960  PCP:  Franklin Will MD  PHONE:  [phone]    DATE: 2020    BRIEF HPI:  Nancy Albert is a 60 y.o. female with a history of uncontrolled hypertension, palpitations, GERD, and gastroparesis that is being referred by Dr. Paramjit Beckford to undergo a cardiac catheterization for nonexertional chest pain with radiation to her neck, back, bilateral shoulders and bilateral arms that is concerning for unstable angina.  The patient was started on isosorbide about 4 days ago but it has caused her severe headaches with vomiting so she stopped taking it.  She has been intolerant to multiple antihypertensive medications including beta-blockers and verapamil.  The patient reports her anginal symptoms tend to occur about an hour after taking her medications.  Her symptoms are intermittent throughout the day and do not appear to be relieved with rest.  She has not used any sublingual nitroglycerin.  She did undergo a stress echocardiogram in October that was normal.      Cardiac Risk Factors: Hypertension    Anginal class in last 2 weeks:  CCS class II    CHF Class in last 2 weeks:  NYHA Class I    Cardiogenic shock:  no    Cardiac arrest <24 hours:  no    Stress test within last 6 months:   yes   Details: Exercise echo (10/5/2020): Normal with no regional wall abnormality.  Normal LVEF.  No significant valvular abnormality.    Previous cardiac catheterization:  no  Details:     Previous CABG:  no  Details:      Allergies:     IV contrast allergy:  no  Allergies   Allergen Reactions   • Beta Adrenergic Blockers GI Intolerance   • Verapamil Hcl Er GI Intolerance       MEDICATIONS:  Prior to Admission medications    Medication Sig Start Date End Date Taking? Authorizing Provider   aspirin 81 MG EC tablet Take 1 tablet by mouth Daily. 20   Paramjit Beckford MD Ginger, Zingiber  officinalis, (GINGER ROOT) 550 MG capsule Take 550 mg by mouth 3 (Three) Times a Day Before Meals. 5/20/20   Nicola Mukherjee MD   isosorbide mononitrate (IMDUR) 30 MG 24 hr tablet Take 1 tablet by mouth Daily. 11/17/20   Paramjit Beckford MD   losartan (COZAAR) 50 MG tablet Take 1 tablet by mouth 2 (Two) Times a Day. 11/2/20   Paramjit Beckford MD   Multiple Vitamins-Minerals (MULTIVITAMIN ADULT PO) Take  by mouth Daily.    Provider, MD Ruthie   nitroglycerin (NITROSTAT) 0.4 MG SL tablet 1 under the tongue as needed for angina, may repeat q5mins for up three doses 11/17/20   Paramjit Beckford MD       Past medical & surgical history, social and family history reviewed in the electronic medical record.    ROS:  All systems reviewed were negative except pertinent positives listed in HPI    Physical Exam:    Vitals: There were no vitals filed for this visit. There were no vitals filed for this visit.      Constitutional:       Appearance: Healthy appearance. Well-developed.   Eyes:      General: Lids are normal. No scleral icterus.     Conjunctiva/sclera: Conjunctivae normal.   HENT:      Head: Normocephalic and atraumatic.   Neck:      Musculoskeletal: Normal range of motion.      Thyroid: No thyromegaly.      Vascular: No carotid bruit or JVD.   Pulmonary:      Effort: Pulmonary effort is normal.      Breath sounds: Normal breath sounds. No wheezing. No rhonchi. No rales.   Cardiovascular:      Normal rate. Regular rhythm.      Murmurs: There is no murmur.      No gallop. No rub.   Pulses:     Intact distal pulses.   Edema:     Peripheral edema absent.   Abdominal:      General: There is no distension.      Palpations: Abdomen is soft. There is no abdominal mass.   Skin:     General: Skin is warm and dry.      Findings: No rash.   Neurological:      General: No focal deficit present.      Mental Status: Alert and oriented to person, place, and time.      Gait: Gait is intact.   Psychiatric:         Attention  and Perception: Attention normal.         Mood and Affect: Mood normal.         Behavior: Behavior normal.       Barbaeu Test:  Left: Normal  (oxymetric Allens) Right: Normal             Lab Results   Component Value Date    AST 18 05/01/2020    ALT 9 05/01/2020           Impression      Active Hospital Problems    Diagnosis   • **Unstable angina (CMS/HCC)     · Exercise echo (10/5/2020): Exercised 6:27 (6:55 expected).  Normal exercise echo with no regional wall motion abnormality.  LVEF 65%.  No significant valvular abnormality     • Essential hypertension       Plan   Patient presents today to undergo cardiac catheterization for atypical chest pain that could represent unstable angina.  The risks and benefits of the procedure were discussed and the patient is agreeable to proceed.  She has been tested for COVID-19 per protocol and results were negative.      · Lab results pending and if acceptable proceed with cardiac cath via the right radial approach  · Further recommendations to follow              ANA Carpenter  11/23/20  08:16 EST

## 2020-11-24 RX ORDER — SPIRONOLACTONE 25 MG/1
25 TABLET ORAL DAILY
Qty: 30 TABLET | Refills: 11 | Status: SHIPPED | OUTPATIENT
Start: 2020-11-24 | End: 2021-02-23

## 2020-11-24 RX ORDER — LOSARTAN POTASSIUM 25 MG/1
25 TABLET ORAL DAILY
Qty: 90 TABLET | Refills: 1 | Status: SHIPPED | OUTPATIENT
Start: 2020-11-24 | End: 2021-02-23

## 2020-11-24 RX ORDER — DILTIAZEM HYDROCHLORIDE 120 MG/1
120 CAPSULE, EXTENDED RELEASE ORAL DAILY
Qty: 90 CAPSULE | Refills: 3 | Status: SHIPPED | OUTPATIENT
Start: 2020-11-24 | End: 2021-01-13 | Stop reason: DRUGHIGH

## 2020-12-21 ENCOUNTER — OFFICE VISIT (OUTPATIENT)
Dept: ONCOLOGY | Facility: CLINIC | Age: 60
End: 2020-12-21

## 2020-12-21 ENCOUNTER — LAB (OUTPATIENT)
Dept: LAB | Facility: HOSPITAL | Age: 60
End: 2020-12-21

## 2020-12-21 VITALS
HEIGHT: 64 IN | OXYGEN SATURATION: 95 % | DIASTOLIC BLOOD PRESSURE: 96 MMHG | WEIGHT: 131 LBS | BODY MASS INDEX: 22.36 KG/M2 | SYSTOLIC BLOOD PRESSURE: 182 MMHG | TEMPERATURE: 96.9 F | HEART RATE: 74 BPM | RESPIRATION RATE: 18 BRPM

## 2020-12-21 DIAGNOSIS — D70.8 OTHER NEUTROPENIA (HCC): Primary | ICD-10-CM

## 2020-12-21 PROCEDURE — 99213 OFFICE O/P EST LOW 20 MIN: CPT | Performed by: INTERNAL MEDICINE

## 2020-12-21 NOTE — PROGRESS NOTES
"    REASON FOR VISIT: Neutropenia    HISTORY OF PRESENT ILLNESS:   60 y.o.  female presents today for follow-up of her neutropenia.  Since I last saw her she is doing reasonably well.  She does have some hypertensive issues she is followed by nephrology for.  No recent infections.    Past medical history, social history and family history was reviewed and unchanged from prior visit.    Review of Systems:    Review of Systems - Oncology         Medications:        Current Outpatient Medications:   •  dilTIAZem XR (DILACOR XR) 120 MG 24 hr capsule, Take 1 capsule by mouth Daily., Disp: 90 capsule, Rfl: 3  •  Ginger, Zingiber officinalis, (GINGER ROOT) 550 MG capsule, Take 550 mg by mouth 3 (Three) Times a Day Before Meals. (Patient taking differently: Take 550 mg by mouth 2 (two) times a day.), Disp: 90 capsule, Rfl: 3  •  losartan (COZAAR) 25 MG tablet, Take 1 tablet by mouth Daily., Disp: 90 tablet, Rfl: 1  •  Multiple Vitamins-Minerals (MULTIVITAMIN ADULT PO), Take 1 tablet by mouth Daily., Disp: , Rfl:   •  nitroglycerin (NITROSTAT) 0.4 MG SL tablet, 1 under the tongue as needed for angina, may repeat q5mins for up three doses (Patient taking differently: Place 0.4 mg under the tongue Every 5 (Five) Minutes As Needed. 1 under the tongue as needed for angina, may repeat q5mins for up three doses), Disp: 25 tablet, Rfl: 11  •  spironolactone (ALDACTONE) 25 MG tablet, Take 1 tablet by mouth Daily., Disp: 30 tablet, Rfl: 11           ALLERGIES:    Allergies   Allergen Reactions   • Beta Adrenergic Blockers GI Intolerance   • Verapamil Hcl Er GI Intolerance         Physical Exam    VITAL SIGNS:  BP (!) 182/96 Comment: LUE  Pulse 74   Temp 96.9 °F (36.1 °C) (Temporal)   Resp 18   Ht 161.3 cm (63.5\")   Wt 59.4 kg (131 lb)   SpO2 95% Comment: RA  BMI 22.84 kg/m²     Wt Readings from Last 3 Encounters:   12/21/20 59.4 kg (131 lb)   11/23/20 61.1 kg (134 lb 11.2 oz)   10/13/20 59.9 kg (132 lb)       Body mass index " is 22.84 kg/m². Body surface area is 1.62 meters squared.    Pain Score    12/21/20 1029   PainSc: 0-No pain         Performance Status: 0    General: well appearing, in no acute distress  HEENT: sclera anicteric, neck is supple  Lymphatics: no cervical, supraclavicular, or axillary adenopathy  Cardiovascular: regular rate and rhythm, no murmurs, rubs or gallops  Lungs: clear to auscultation bilaterally  Abdomen: soft, nontender, nondistended.  No palpable organomegaly  Extremities: no lower extremity edema  Skin: no rashes, lesions, bruising, or petechiae  Msk:  Shows no weakness of the large muscle groups  Psych: Mood is stable        RECENT LABS:    Lab Results   Component Value Date    HGB 14.1 11/23/2020    HCT 44.2 11/23/2020    MCV 88.6 11/23/2020     11/23/2020    WBC 5.24 11/23/2020    NEUTROABS 3.63 11/23/2020    LYMPHSABS 1.23 11/23/2020    MONOSABS 0.28 11/23/2020    EOSABS 0.06 11/23/2020    BASOSABS 0.03 11/23/2020       Lab Results   Component Value Date    GLUCOSE 100 (H) 11/23/2020    BUN 16 11/23/2020    CREATININE 0.71 11/23/2020     11/23/2020    K 3.8 11/23/2020     11/23/2020    CO2 25.0 11/23/2020    CALCIUM 9.9 11/23/2020    PROTEINTOT 7.2 11/23/2020    ALBUMIN 4.60 11/23/2020    BILITOT 0.7 11/23/2020    ALKPHOS 71 11/23/2020    AST 16 11/23/2020    ALT 9 11/23/2020       No Images in the past 120 days found..        Assessment/Plan    1.  Mild neutropenia.  This appears to be transient and resolved.  No further work-up is necessary at this time.    2.  Hypertension.  She is being followed by nephrology to see if she has renal artery stenosis.            Bhupendra De La Cruz MD  [facility]         No orders of the defined types were placed in this encounter.      12/21/2020

## 2021-01-13 RX ORDER — DILTIAZEM HYDROCHLORIDE 120 MG/1
120 CAPSULE, EXTENDED RELEASE ORAL 2 TIMES DAILY
Qty: 180 CAPSULE | Refills: 3 | Status: SHIPPED | OUTPATIENT
Start: 2021-01-13 | End: 2021-08-05 | Stop reason: ALTCHOICE

## 2021-01-13 NOTE — TELEPHONE ENCOUNTER
Noted; would alter diltiazem XR to 120 mg twice daily and take MiraLAX 17 g daily as needed constipation and discontinue losartan.  I thought she was going to see [facility] for second opinion about her hypertension?      Thanks!

## 2021-01-13 NOTE — TELEPHONE ENCOUNTER
Pt's BP readings    Jan 13:  149/78 HR 60 and 138/81 HR 60  Jan 12:  156/87 HR 60 and 162/88 HR 60  Jan 08:  135/83 HR 59 and 139/77 HR 60  Jan 07:  127/88 HR 66 and 136/82 HR 67  Jan 06:  141/85 HR 61 and 147/80 HR 64    Patient states that she lost weight while taking the spironolactone. Medication also made her feel bad. Patient denies swelling before starting spironolactone and currently.     Pt currently taking:  Diltiazem 120 mg daily  Losartan 25 mg daily    Please advise.

## 2021-01-13 NOTE — TELEPHONE ENCOUNTER
Called pt and gave KTS recommendations above. Pt had telehealth appt with nephrology, Dr. Castillo, she goes to see them in the office on Feb. 9th.     Pt verbalizes understanding and agreeable to plan.

## 2021-01-13 NOTE — TELEPHONE ENCOUNTER
----- Message from Nancy Albert sent at 1/12/2021 11:51 PM EST -----  Regarding: Non-Urgent Medical Question  Contact: [phone]  Received call 1/12/21 for an appt. on 1/13/21 due to a cancellation but had to decline due to prior engagement. Need to schedule appt. ASAP.  Not doing well on present meds. Dilt XR 120MG causes constipation (never had before) and feels like I have constant knot in my throat. This occurred before with other meds.  Losartan 25MG causes chest/back pain.  Pain will began about 1 hr. after taking Losartan and continue off/on throughout day/night. I complained about chest/back pain for previous months and prior to taking Dilt XR. Losartan is only med NOT changed so it appears the culprit to chest/back pain. Also weakness, fatigue and jittery. After 2 weeks stopped Spironolactone 25MG due to losing 3 pds. BP inconsistent up and down.

## 2021-02-23 ENCOUNTER — OFFICE VISIT (OUTPATIENT)
Dept: CARDIOLOGY | Facility: CLINIC | Age: 61
End: 2021-02-23

## 2021-02-23 VITALS
HEART RATE: 78 BPM | BODY MASS INDEX: 23.66 KG/M2 | HEIGHT: 64 IN | SYSTOLIC BLOOD PRESSURE: 138 MMHG | OXYGEN SATURATION: 99 % | WEIGHT: 138.6 LBS | DIASTOLIC BLOOD PRESSURE: 82 MMHG

## 2021-02-23 DIAGNOSIS — R07.9 CHEST PAIN WITH NORMAL ANGIOGRAPHY: ICD-10-CM

## 2021-02-23 DIAGNOSIS — R52 GENERALIZED BODY ACHES: ICD-10-CM

## 2021-02-23 DIAGNOSIS — M62.81 MUSCLE WEAKNESS: ICD-10-CM

## 2021-02-23 DIAGNOSIS — R00.2 PALPITATIONS: ICD-10-CM

## 2021-02-23 DIAGNOSIS — I11.9 HYPERTENSIVE HEART DISEASE WITHOUT HEART FAILURE: Primary | ICD-10-CM

## 2021-02-23 PROCEDURE — 99214 OFFICE O/P EST MOD 30 MIN: CPT | Performed by: INTERNAL MEDICINE

## 2021-02-23 NOTE — PROGRESS NOTES
Subjective:     Encounter Date:02/23/2021    Patient ID: Nancy Albert is a 60 y.o. single -American female from [place], retired from the [employer].     PHYSICIAN: Franklin Will MD  SLEEP PHYSICIAN: [facility]  REFERRING HEALTHCARE PROVIDER: ANA Theodore  GI PHYSICIAN:  Nicola Mukherjee MD .  HEMATOLOGIST: Bhupendra De La Cruz MD  INTERVENTIONAL CARDIOLOGIST: Checo Groves IV, MD, [facility]  NEPHROLOGIST: Reji Castillo MD    Chief Complaint:   Chief Complaint   Patient presents with   • Hypertension       Problem List:  1. Hypertensive cardiovascular disease:  a. Graded exercise test, 2/11/2016: The maximal exercise stress test negative by ST criteria.  No precordial  symptoms developed.  Normal systolic blood pressure response.  Normal exercise tolerance.  No ventricular ectopy recorded.  Clinical correlation required.  b. Remote 24-hour Holter monitor approximately 2016 with her physician: Normal-data deficit   c. Stress echocardiogram, 10/12/2018: Normal stress echo with no significant echocardiographic evidence for myocardial ischemia, EF 0.56-0.60  d. Holter monitor, 9/6/2018: Abnormal monitor study with one episode of nonsustained V. tach, 6 beats in duration.  Occasional PVCs.  Rare, brief episodes of PAT.  Patient triggered event related was sinus rhythm  e. 24-hour ambulatory blood pressure monitor November 2018: Average blood pressure 100-120/60-70 torr, heart rate 60-70 bpm, maximum blood pressure 160/85  f. Residual class I symptoms with intermittent random atypical chest pain, March 2019, September 2020   g. Stress echo, 10/5/2020: Acceptable negative echocardiographic exercise stress test  h. LHC, 11/23/2020: Essentially normal coronary arteries with normal LV filling pressure.  i. CCS class I atypical chest pain syndrome with NYHA class II exertional dyspnea and fatigue, February 2021.  2. Hypertension  - probably essential with multiple drug intolerances.  3. Apparent asymptomatic nonsustained ventricular tachycardia during hypokalemia, incidentally found on Holter monitor, September 2018, [employer] 5/12/2020-5/26/2020: Heart rate ranged between  bpm with average 71 bpm, rare atrial and ventricular ectopy, first-degree AV block intermittently, and 1 run of SVT lasting 9 beats at a maximum of 152 bpm but was asymptomatic.  No evidence of atrial fibrillation/flutter, V. tach, pauses, or ischemic ST-T changes.  4. Palpitations with Holter Monitor (9/6/2018): Abnormal monitor study with one episode of nonsustained V. tach, 6 beats in duration.  Occasional PVCs.  Rare, brief episodes of PAT, with recent increased severity of palpitation and event monitor completed - data deficit, April 2020   5. Cardiac murmur with acceptable echocardiogram, October 2018  6. At risk for sleep apnea with negative sleep study in 2017-data deficit  7. Acid reflux, diagnosed August 2019, with repeat ED visits winter 2020 with abdominal pain and continued GI evaluation and treatment, April 2020  8. Leukopenia August 2020  9. Surgical history: KATI, left oophorectomy    Allergies   Allergen Reactions   • Beta Adrenergic Blockers GI Intolerance   • Verapamil Hcl Er GI Intolerance       Current Outpatient Medications:   •  dilTIAZem XR (DILACOR XR) 120 MG 24 hr capsule, Take 1 capsule by mouth 2 (two) times a day., Disp: 180 capsule, Rfl: 3  •  Ginger, Zingiber officinalis, (GINGER ROOT) 550 MG capsule, Take 550 mg by mouth 3 (Three) Times a Day Before Meals. (Patient taking differently: Take 550 mg by mouth 2 (two) times a day.), Disp: 90 capsule, Rfl: 3  •  nitroglycerin (NITROSTAT) 0.4 MG SL tablet, 1 under the tongue as needed for angina, may repeat q5mins for up three doses (Patient taking differently: Place 0.4 mg under the tongue Every 5 (Five) Minutes As Needed. 1 under the tongue as needed for angina, may repeat q5mins for up three  "doses), Disp: 25 tablet, Rfl: 11  •  Probiotic Product (PROBIOTIC-10 PO), Take 1 tablet by mouth Daily., Disp: , Rfl:     History of Present Illness: Patient returns after 5-month hiatus for follow up. Since last visit, she has established care with Dr. De La Cruz for her neutropenia. He obtained laboratory studies to assess for possible pheochromocytoma. He was also concerned about her 40 pound weight loss. When she followed up with him he reported her neutropenia had resolved. She has been feeling well overall from a cardiovascular standpoint but continues to experience headache, fatigue, and myalgias. Patient denies chest pain, shortness of breath, palpitations, edema, dizziness, and syncope. She has had no interim ER visits, hospitalizations, serious illnesses, or surgeries. She monitors her blood pressure at home and hand carries her log to the office for review. Over the past 1 week her blood pressure has ranged from 124-145 systolic and 76-88 diastolic. She has had renal ultrasound but is unaware of the results as are we. She has not yet been seen by her nephrologist, Dr. Castillo, and needs to have laboratory studies performed. She sees Dr. Mukherjee for her gastroparesis and he recommended she go to the GI motility clinic in [place] but she did not feel comfortable with this plan as she did not have nausea, vomiting, diarrhea, and constipation and she did not go. She does note she is constipated on her current medication regimen. She has had EGD, colonoscopy, and gastric emptying study with Dr. Mukherjee.        ROS   Obtained and negative except as outlined in problem list and HPI.    Procedures       Objective:       Vitals:    02/23/21 1202 02/23/21 1203 02/23/21 1223   BP: 150/76 152/81 138/82   BP Location: Left arm Left arm Right arm   Patient Position: Sitting Standing Sitting   Pulse: 78 78    SpO2: 99%     Weight: 62.9 kg (138 lb 9.6 oz)     Height: 162.6 cm (64\")       Body mass index is 23.79 " kg/m².  Last weight: 132 lbs    Vitals signs reviewed.   Constitutional:       Appearance: Well-developed.   Neck:      Thyroid: No thyromegaly.      Vascular: No carotid bruit or JVD.      Lymphadenopathy: No cervical adenopathy.   Pulmonary:      Effort: Pulmonary effort is normal.      Breath sounds: Normal breath sounds. No wheezing. No rhonchi. No rales.   Cardiovascular:      Regular rhythm.      Murmurs: There is a grade 1/6 mid frequency early systolic murmur at the URSB.      No gallop. No S3 gallop.   Pulses:     Dorsalis pedis: 2+ bilaterally.     Posterior tibial: 2+ bilaterally.  Edema:     Peripheral edema absent.   Abdominal:      Palpations: Abdomen is soft. There is no abdominal mass.      Tenderness: There is no abdominal tenderness. There is no guarding.   Musculoskeletal: Normal range of motion.   Skin:     General: Skin is warm and dry.      Findings: No rash.   Neurological:      Mental Status: Alert and oriented to person, place, and time.           Lab Review:   Lab Results   Component Value Date    GLUCOSE 100 (H) 11/23/2020    BUN 16 11/23/2020    CREATININE 0.71 11/23/2020    EGFRIFAFRI 101 11/23/2020    BCR 22.5 11/23/2020     11/23/2020    K 3.8 11/23/2020     11/23/2020    MG 2.3 04/01/2020    CO2 25.0 11/23/2020    CALCIUM 9.9 11/23/2020    PROTENTOTREF 7.5 09/28/2020    ALBUMIN 4.60 11/23/2020    LABIL2 1.1 09/28/2020    AST 16 11/23/2020    ALT 9 11/23/2020       Lab Results   Component Value Date    WBC 5.24 11/23/2020    HGB 14.1 11/23/2020    HCT 44.2 11/23/2020    MCV 88.6 11/23/2020     11/23/2020       Lab Results   Component Value Date    HGBA1C 5.20 11/23/2020       Lab Results   Component Value Date    TSH 0.432 09/10/2020       Lab Results   Component Value Date    CHOL 187 11/23/2020     Lab Results   Component Value Date    TRIG 68 11/23/2020     Lab Results   Component Value Date    HDL 70 (H) 11/23/2020     Lab Results   Component Value Date    LDL  "104 (H) 11/23/2020     Stress Echocardiogram, 10/5/2020:  · Patient reported baseline left-sided chest pressure of 3/10 severity that is \"just annoying\" and that stayed the same intensity at rest and with exercise.  · Expected exercise time = 6:55 minutes; actual exercise time: 6:27 minutes; (DOLORES +5).  · THR of 136/minute reached at 3:44 minutes.  · WNL room air oximetry before, during, and after exercise (97-99%).  · BMI 23.24.  · Estimated left ventricular EF = 65% Left ventricular ejection fraction appears to be 61 - 65%. Left ventricular systolic function is normal.  · Trace to mild mitral valve regurgitation is present with a centrally-directed jet noted.  · Mild tricuspid valve regurgitation is present.  · Estimated right ventricular systolic pressure from tricuspid regurgitation is normal (<35 mmHg). Calculated right ventricular systolic pressure from tricuspid regurgitation is 27 mmHg.  · No evidence of left ventricular thrombus or mass present.  · Normal right ventricular cavity size, wall thickness, systolic function and septal motion noted.  · No evidence of pulmonary hypertension is present.  · There is no evidence of pericardial effusion.  · No significant structural valvular abnormality demonstrated.  · Acceptable negative echocardiographic exercise stress test without definitive ischemic electrocardiographic changes or regional wall motion abnormality noted with preserved systolic left ventricular function suggestive of low probability for significant focal obstructive coronary artery disease following exercise to 95% predicted exercise capacity and 106% predicted maximum heart rate.    Left Heart Catheterization, 11/23/2020:  · Essentially normal coronary arteries  · Normal LV filling pressure        Assessment:       Overall continued acceptable course with no new interim cardiopulmonary complaints with fair functional status. We will defer additional diagnostic or therapeutic intervention from a " cardiac perspective at this time.     Diagnosis Plan   1. Hypertensive heart disease without heart failure  Normal stress echo and LHC in October and November 2020. No recurrent angina pectoris or CHF on current activity. Continue current treatment.   2. Palpitations  Stable and asymptomatic. Continue current treatment.   3. Chest pain with normal angiography  Normal stress echo and LHC in October and November 2020. No recurrent angina pectoris or CHF on current activity. Continue current treatment.   4. Muscle weakness  Persistent symptoms believed to be medication side effect. Follow up with Dr. Castillo to assess hypertension and necessity of antihypertensive medication. Her albumin of 4.6 g/dL argues against malnutrition.    5. Generalized body aches  Persistent symptoms believed to be medication side effect. Follow up with Dr. Castillo to assess hypertension and both necessity of antihypertensive medication and additional options.          Plan:         1. Patient to continue current medications and close follow up with the above providers.  2. Encouraged COVID immunization when available.  3. Tentative cardiology follow up in July/August 2021 or patient may return sooner PRN.    I, Dennys Quintanilla, attest that this documentation has been prepared under the direction and in the presence of Paramjit Beckford MD 02/23/2021    IParamjit MD, [facility], personally performed the services described in this documentation as scribed by the above named individual in my presence, and it is both accurate and complete. At 13:05 EST on 02/23/2021

## 2021-03-16 ENCOUNTER — TRANSCRIBE ORDERS (OUTPATIENT)
Dept: ADMINISTRATIVE | Facility: HOSPITAL | Age: 61
End: 2021-03-16

## 2021-03-16 DIAGNOSIS — E83.52 HYPERCALCEMIA: Primary | ICD-10-CM

## 2021-04-12 ENCOUNTER — HOSPITAL ENCOUNTER (OUTPATIENT)
Dept: CT IMAGING | Facility: HOSPITAL | Age: 61
Discharge: HOME | End: 2021-04-12
Admitting: INTERNAL MEDICINE

## 2021-04-12 DIAGNOSIS — E83.52 HYPERCALCEMIA: ICD-10-CM

## 2021-04-12 PROCEDURE — 71250 CT THORAX DX C-: CPT

## 2021-04-12 PROCEDURE — 74150 CT ABDOMEN W/O CONTRAST: CPT

## 2021-07-21 ENCOUNTER — TELEPHONE (OUTPATIENT)
Dept: CARDIOLOGY | Facility: CLINIC | Age: 61
End: 2021-07-21

## 2021-07-21 DIAGNOSIS — R53.83 FATIGUE, UNSPECIFIED TYPE: Primary | ICD-10-CM

## 2021-07-21 NOTE — TELEPHONE ENCOUNTER
Per [facility]- ok to place referral to cardiac rehab.     Referral placed, pt notified via [employer].

## 2021-07-21 NOTE — TELEPHONE ENCOUNTER
----- Message from Nancy Albert sent at 7/20/2021  4:37 PM EDT -----  Regarding: Non-Urgent Medical Question  Contact: [phone]  Had virtual visit with [facility] w/Dr Janel Green on 6/30/21 and according to my symptoms, she determined that I do not have gastroparesis. Gastroparesis is very debilitating and she said I have none of the symptoms.  I mentioned that the BP meds may be the culprit, so she referred me to Dr Travis at [facility]; we met on 7/12/21 thru a virtual visit.  Dr Travis suggested that I discuss with you about getting in the [facility] Phase 3 Cardiac Rehab Program.  He said it would help with my chronic fatigue.  Never heard of the program and was hoping if you could give me some info.

## 2021-07-22 ENCOUNTER — TRANSCRIBE ORDERS (OUTPATIENT)
Dept: CARDIAC REHAB | Facility: HOSPITAL | Age: 61
End: 2021-07-22

## 2021-07-22 ENCOUNTER — DOCUMENTATION (OUTPATIENT)
Dept: CARDIAC REHAB | Facility: HOSPITAL | Age: 61
End: 2021-07-22

## 2021-07-22 DIAGNOSIS — Z00.00 PREVENTATIVE HEALTH CARE: Primary | ICD-10-CM

## 2021-07-22 NOTE — PROGRESS NOTES
Order received for Phase III Cardiac Rehab. Staff will contact Patient in regards to scheduling an appointment.

## 2021-07-22 NOTE — PROGRESS NOTES
Pt. Referred for Phase III Cardiac Rehab. Staff discussed benefits of exercise, program protocol, and educational material provided. Teach back verified.  Patient scheduled for orientation at [facility] on Tuesday, September 21st at 0900.

## 2021-08-05 ENCOUNTER — OFFICE VISIT (OUTPATIENT)
Dept: SLEEP MEDICINE | Facility: HOSPITAL | Age: 61
End: 2021-08-05

## 2021-08-05 VITALS
HEART RATE: 81 BPM | HEIGHT: 64 IN | OXYGEN SATURATION: 98 % | BODY MASS INDEX: 26.12 KG/M2 | DIASTOLIC BLOOD PRESSURE: 99 MMHG | WEIGHT: 153 LBS | SYSTOLIC BLOOD PRESSURE: 184 MMHG

## 2021-08-05 DIAGNOSIS — R06.83 SNORING: ICD-10-CM

## 2021-08-05 DIAGNOSIS — E66.3 OVERWEIGHT (BMI 25.0-29.9): ICD-10-CM

## 2021-08-05 DIAGNOSIS — R53.83 FATIGUE, UNSPECIFIED TYPE: Primary | ICD-10-CM

## 2021-08-05 DIAGNOSIS — F51.04 CHRONIC INSOMNIA: ICD-10-CM

## 2021-08-05 PROCEDURE — 99204 OFFICE O/P NEW MOD 45 MIN: CPT | Performed by: INTERNAL MEDICINE

## 2021-08-05 RX ORDER — LOSARTAN POTASSIUM 100 MG/1
100 TABLET ORAL DAILY
COMMUNITY
Start: 2021-07-13 | End: 2022-09-08 | Stop reason: ALTCHOICE

## 2021-08-05 NOTE — PROGRESS NOTES
New Sleep Patient Office Visit      Patient Name: Nancy Albert    Referring Physician: Heavenly Carroll MD    Chief Complaint:    Chief Complaint   Patient presents with   • Sleeping Problem       History of Present Illness: Nancy Albert is a 60 y.o. female who is here today to establish care with Sleep Medicine.     Pleasant 60-year-old female coming in for initial evaluation.  Patient states that she was seen in sleep clinic about 10 years ago and had a sleep study where she was found to have borderline sleep apnea but did not require any treatment.  She has had problems with her difficulty controlling her blood pressure.  She is seen by cardiology as well as nephrology and medications are being adjusted.  Blood pressure is still significantly elevated.  Patient states that she is not sleeping very well for past couple of years and is excessively fatigued for the same duration.  States that she underwent endoscopy at one point and she was thought to have silent reflux and was placed on antireflux medications.  That apparently resulted in small bowel bacterial overgrowth and led to weight loss and her fatigue which has been continuing ever since.  Patient states that she goes to sleep and can fall asleep within 5 to 15 minutes.  But she keeps on waking up multiple times during the night for unclear reason and has difficulty going back to sleep.  She sometimes stays in bed and reads a book until she is able to fall back asleep.  She does not feel rested when she wakes up. Does not take naps during the daytime however. She has been told that she snores loudly at night.  No history of witnessed apneas.  Does get dry mouth in the morning.  No headaches however.  Denies any heartburn or reflux symptoms.  Denies any driving problems or sleep-related accidents.    Patient denies any smoking.  Denies any alcohol use.  1 to 2 cups of decaf coffee weekly.  No excessive caffeine intake.    Patient does have chronic  back pain not on any medications currently for that.  Not able to do much exercise due to excessive fatigue symptoms.    Further sleep history is as below.    Clarkston Scale: 1/24    Estimated average amount of sleep per night: 5-6  Number of times  she wakes up at night: 1-2  Difficulty falling back asleep: yes  It usually takes 15 minutes to go to sleep.  She feels sleepy upon waking up: yes  Rotating or night shift work: no    Drowsiness/Sleepiness:  She exhibits the following:  excessive daytime fatigue  falls asleep watching TV    Snoring/Breathing:  She exhibits the following:  loud snoring  awoken with dry mouth  morning headaches    Reflux:  She describes the following:  NA    Narcolepsy:  She exhibits the following:  none    RLS/PLMs:  She describes the following:  none    Insomnia:  She describes the following:  frequent awakenings  restless sleep    Parasomnia:  She exhibits the following:  none    Weight:  Weight change in the last year:  stable    Subjective      Review of Systems:   Review of Systems   Constitutional: Positive for fatigue.   HENT: Negative.    Respiratory: Negative.    Cardiovascular: Negative.    Gastrointestinal: Negative.    Endocrine: Negative.    Musculoskeletal: Positive for back pain and neck pain.   Skin: Negative.    Neurological: Positive for dizziness and headache.   Hematological: Negative.    Psychiatric/Behavioral: Negative.    All other systems reviewed and are negative.      Past Medical History:   Past Medical History:   Diagnosis Date   • Arthritis 2019   • Gastroparesis    • GERD (gastroesophageal reflux disease)    • Heart murmur    • Hypertension        Past Surgical History:   Past Surgical History:   Procedure Laterality Date   • CARDIAC CATHETERIZATION N/A 11/23/2020    Procedure: Left Heart Cath;  Surgeon: Checo Groves IV, MD;  Location: [facility] INVASIVE LOCATION;  Service: Cardiology;  Laterality: N/A;   • COLONOSCOPY  02/2020   • ENDOSCOPY      • HYSTERECTOMY  07/15/2008    Partial   • OOPHORECTOMY Left 07/15/2008   • WISDOM TOOTH EXTRACTION         Family History:   Family History   Problem Relation Age of Onset   • Cancer Mother    • Pancreatic cancer Mother    • Hypertension Mother    • Arthritis Father    • Hypertension Father    • No Known Problems Sister    • No Known Problems Brother    • Stroke Maternal Grandmother    • No Known Problems Maternal Grandfather    • No Known Problems Paternal Grandmother    • No Known Problems Paternal Grandfather    • No Known Problems Daughter    • Breast cancer Neg Hx    • Ovarian cancer Neg Hx        Social History:   Social History     Socioeconomic History   • Marital status: Single     Spouse name: Not on file   • Number of children: Not on file   • Years of education: Not on file   • Highest education level: Not on file   Tobacco Use   • Smoking status: Never Smoker   • Smokeless tobacco: Never Used   Vaping Use   • Vaping Use: Never used   Substance and Sexual Activity   • Alcohol use: No   • Drug use: No   • Sexual activity: Defer       Medications:     Current Outpatient Medications:   •  Ginger, Zingiber officinalis, (GINGER ROOT) 550 MG capsule, Take 550 mg by mouth 3 (Three) Times a Day Before Meals. (Patient taking differently: Take 550 mg by mouth 2 (two) times a day.), Disp: 90 capsule, Rfl: 3  •  losartan (COZAAR) 100 MG tablet, , Disp: , Rfl:   •  Probiotic Product (PROBIOTIC-10 PO), Take 1 tablet by mouth Daily., Disp: , Rfl:   •  nitroglycerin (NITROSTAT) 0.4 MG SL tablet, 1 under the tongue as needed for angina, may repeat q5mins for up three doses (Patient taking differently: Place 0.4 mg under the tongue Every 5 (Five) Minutes As Needed. 1 under the tongue as needed for angina, may repeat q5mins for up three doses), Disp: 25 tablet, Rfl: 11    Allergies:   Allergies   Allergen Reactions   • Beta Adrenergic Blockers GI Intolerance   • Verapamil Hcl Er GI Intolerance       Objective  "    Physical Exam:  Vital Signs:   Vitals:    08/05/21 1401   BP: (!) 184/99   Pulse: 81   SpO2: 98%   Weight: 69.4 kg (153 lb)   Height: 162.6 cm (64\")       Physical Exam  Vitals and nursing note reviewed.   Constitutional:       General: She is not in acute distress.     Appearance: She is well-developed. She is not diaphoretic.   HENT:      Head: Normocephalic and atraumatic.      Comments: Mallampati grade 1  Large uvula     Nose: Nose normal.      Mouth/Throat:      Pharynx: No oropharyngeal exudate.   Eyes:      General: No scleral icterus.        Right eye: No discharge.         Left eye: No discharge.      Pupils: Pupils are equal, round, and reactive to light.   Neck:      Thyroid: No thyromegaly.      Trachea: No tracheal deviation.   Cardiovascular:      Rate and Rhythm: Normal rate and regular rhythm.      Heart sounds: Normal heart sounds. No murmur heard.   No friction rub. No gallop.    Pulmonary:      Effort: Pulmonary effort is normal. No respiratory distress.      Breath sounds: Normal breath sounds. No stridor. No wheezing or rales.   Abdominal:      General: There is no distension.      Palpations: Abdomen is soft.      Tenderness: There is no abdominal tenderness.   Musculoskeletal:         General: No tenderness.      Cervical back: Neck supple.      Right lower leg: No edema.      Left lower leg: No edema.      Comments: Clubbing: none   Lymphadenopathy:      Cervical: No cervical adenopathy.   Neurological:      Mental Status: She is alert and oriented to person, place, and time.      Cranial Nerves: No cranial nerve deficit.      Coordination: Coordination normal.   Psychiatric:         Behavior: Behavior normal.         Thought Content: Thought content normal.         Judgment: Judgment normal.         Results Review:   I reviewed the patient's new clinical results.  Lab Results   Component Value Date    TSH 0.432 09/10/2020       Assessment / Plan      Assessment:   Problem List Items " Addressed This Visit     None      Visit Diagnoses     Fatigue, unspecified type    -  Primary    Relevant Orders    Home Sleep Study    Snoring        Relevant Orders    Home Sleep Study    Overweight (BMI 25.0-29.9)        Chronic insomnia                Plan:   1.  Patient presenting with complains of snoring, restless sleep which is quite fragmented and excessive daytime fatigue which is going on for some time.  Discussed with patient that likely culprit is her insomnia and not having sufficient sleep.  She is only sleeping 5 hours a night.  She states that she is able to increase her sleep time.  Fatigue is quite disabling for her and she is not able to get through the day without struggling with that.  Not able to do much activity or exercise either.  Discussed that we will need to proceed systematically to further evaluate this.  I would like to evaluate for any presence of sleep disordered breathing especially given her snoring and excessive fatigue symptoms and uncontrolled blood pressure issues.  We will proceed with home-based testing to evaluate that.  Sleep study is positive for sleep apnea then will consider treatment of that with CPAP or oral appliance therapy.  If sleep study is negative then we will proceed toward further management of her insomnia.    2.  Advised patient to work toward increasing her sleep time.  She is doing everything else for good sleep hygiene and will continue the same for now.    3.  Patient's blood pressure is significantly elevated.  That will need to be monitored and treated as needed.  Follows with nephrology and recently medicines were adjusted she is going to see them again next month.  I advised her to contact their office to let them know that her blood pressure is running high even at home it is running into 150s and today it is in 180s.  She will contact them.  She is not much symptomatic currently.  Advised that high blood pressure can also lead to fatigue  symptoms.  She is wondering about medications causing fatigue but seems less likely at this point.  Advised to cut down on salt intake as well.    At this point we will follow her closely and see how we can help her output significant disabling fatigue symptoms.  Thank you for allowing us to participate in the care of this patient.    Follow Up:   After HST    Discussed plan of care in detail with patient today. Patient verbally understands and agrees. I spent 45 minutes on this date of service. This time includes time spent by me in the following activities:preparing for the visit, reviewing tests, obtaining and/or reviewing a separately obtained history, performing a medically appropriate examination, counseling the patient, ordering medications, tests, or procedures, and/or documenting information in the medical record.       Sebastian Hilton MD  Pulmonary Critical Care and Sleep Medicine      Please note that portions of this note may have been completed with a voice recognition program. Efforts were made to edit the dictations, but occasionally words are mistranscribed.

## 2021-08-23 ENCOUNTER — HOSPITAL ENCOUNTER (OUTPATIENT)
Dept: SLEEP MEDICINE | Facility: HOSPITAL | Age: 61
Discharge: HOME | End: 2021-08-23
Admitting: INTERNAL MEDICINE

## 2021-08-23 VITALS — BODY MASS INDEX: 26.12 KG/M2 | WEIGHT: 153 LBS | HEIGHT: 64 IN

## 2021-08-23 DIAGNOSIS — R53.83 FATIGUE, UNSPECIFIED TYPE: ICD-10-CM

## 2021-08-23 DIAGNOSIS — R06.83 SNORING: ICD-10-CM

## 2021-08-23 PROCEDURE — 95800 SLP STDY UNATTENDED: CPT

## 2021-08-23 PROCEDURE — 95800 SLP STDY UNATTENDED: CPT | Performed by: INTERNAL MEDICINE

## 2021-08-26 ENCOUNTER — TELEPHONE (OUTPATIENT)
Dept: SLEEP MEDICINE | Facility: HOSPITAL | Age: 61
End: 2021-08-26

## 2021-08-26 NOTE — TELEPHONE ENCOUNTER
CALLED PATIENT TO ADVISE OF STUDY RESULTS. TEST WAS WITHIN NORMAL RANGE. PATIENT VERBAL UNDERSTANDING AND STATED THAT SHE WOULD CALL BACK IF SHE HAD ANY OTHER ISSUES OR QUESTIONS

## 2021-08-27 ENCOUNTER — OFFICE VISIT (OUTPATIENT)
Dept: CARDIOLOGY | Facility: CLINIC | Age: 61
End: 2021-08-27

## 2021-08-27 VITALS
HEART RATE: 70 BPM | DIASTOLIC BLOOD PRESSURE: 92 MMHG | OXYGEN SATURATION: 99 % | WEIGHT: 154 LBS | HEIGHT: 64 IN | SYSTOLIC BLOOD PRESSURE: 162 MMHG | BODY MASS INDEX: 26.29 KG/M2

## 2021-08-27 DIAGNOSIS — M62.81 MUSCLE WEAKNESS: ICD-10-CM

## 2021-08-27 DIAGNOSIS — I11.9 HYPERTENSIVE HEART DISEASE WITHOUT HEART FAILURE: Primary | ICD-10-CM

## 2021-08-27 DIAGNOSIS — R00.2 PALPITATIONS: ICD-10-CM

## 2021-08-27 DIAGNOSIS — R52 GENERALIZED BODY ACHES: ICD-10-CM

## 2021-08-27 DIAGNOSIS — R07.9 CHEST PAIN WITH NORMAL ANGIOGRAPHY: ICD-10-CM

## 2021-08-27 PROBLEM — R53.83 FATIGUE: Status: ACTIVE | Noted: 2021-07-12

## 2021-08-27 PROBLEM — I20.8 CARDIAC SYNDROME X (HCC): Status: ACTIVE | Noted: 2020-11-18

## 2021-08-27 PROBLEM — K21.9 GASTROESOPHAGEAL REFLUX DISEASE: Status: ACTIVE | Noted: 2021-06-29

## 2021-08-27 PROBLEM — I20.89 CARDIAC SYNDROME X: Status: ACTIVE | Noted: 2020-11-18

## 2021-08-27 PROBLEM — I25.10 CORONARY ATHEROSCLEROSIS: Status: ACTIVE | Noted: 2021-07-12

## 2021-08-27 PROBLEM — K31.84 GASTROPARESIS: Status: ACTIVE | Noted: 2021-06-29

## 2021-08-27 PROCEDURE — 99214 OFFICE O/P EST MOD 30 MIN: CPT | Performed by: INTERNAL MEDICINE

## 2021-08-27 NOTE — PROGRESS NOTES
Subjective:     Encounter Date:08/27/2021    Patient ID: Nancy Albert is a 60 y.o. single -American female from [place], retired from the [employer].     PHYSICIAN: Franklin Will MD  SLEEP PHYSICIAN: [facility]  REFERRING HEALTHCARE PROVIDER: ANA Theodore  GI PHYSICIAN:  Nicola Mukherjee MD .  HEMATOLOGIST: Bhupendra De La Cruz MD  INTERVENTIONAL CARDIOLOGIST: Checo Groves IV, MD, [facility]  NEPHROLOGIST: Reji Castillo MD/ [facility]  RHEUMATOLOGIST: Heavenly Carroll MD    Chief Complaint:   Chief Complaint   Patient presents with   • Fatigue   • Hypertension       Problem List:  1. Hypertensive cardiovascular disease:  a. Graded exercise test, 2/11/2016: The maximal exercise stress test negative by ST criteria.  No precordial  symptoms developed.  Normal systolic blood pressure response.  Normal exercise tolerance.  No ventricular ectopy recorded.  Clinical correlation required.  b. Remote 24-hour Holter monitor approximately 2016 with her physician: Normal-data deficit   c. Stress echocardiogram, 10/12/2018: Normal stress echo with no significant echocardiographic evidence for myocardial ischemia, EF 0.56-0.60  d. Holter monitor, 9/6/2018: Abnormal monitor study with one episode of nonsustained V. tach, 6 beats in duration.  Occasional PVCs.  Rare, brief episodes of PAT.  Patient triggered event related was sinus rhythm  e. 24-hour ambulatory blood pressure monitor November 2018: Average blood pressure 100-120/60-70 torr, heart rate 60-70 bpm, maximum blood pressure 160/85  f. Residual class I symptoms with intermittent random atypical chest pain, March 2019, September 2020   g. Stress echo, 10/5/2020: Acceptable negative echocardiographic exercise stress test  h. LHC, 11/23/2020: Essentially normal coronary arteries with normal LV filling pressure.  i. CCS class I atypical chest pain syndrome with NYHA  class II exertional dyspnea and fatigue, February 2021, August 2021  2. Hypertension - probably essential with multiple drug intolerances.  3. Apparent asymptomatic nonsustained ventricular tachycardia during hypokalemia, incidentally found on Holter monitor, September 2018, [facility] 5/12/2020-5/26/2020: Heart rate ranged between  bpm with average 71 bpm, rare atrial and ventricular ectopy, first-degree AV block intermittently, and 1 run of SVT lasting 9 beats at a maximum of 152 bpm but was asymptomatic.  No evidence of atrial fibrillation/flutter, V. tach, pauses, or ischemic ST-T changes.  4. Palpitations with Holter Monitor (9/6/2018): Abnormal monitor study with one episode of nonsustained V. tach, 6 beats in duration.  Occasional PVCs.  Rare, brief episodes of PAT, with recent increased severity of palpitation and event monitor completed - data deficit, April 2020   5. Cardiac murmur with acceptable echocardiogram, October 2018  6. At risk for sleep apnea with negative sleep study in 2017-data deficit  7. Acid reflux, diagnosed August 2019, with repeat ED visits winter 2020 with abdominal pain and continued GI evaluation and treatment, April 2020  8. Leukopenia August 2020  9. Surgical history: KATI, left oophorectomy    Allergies   Allergen Reactions   • Beta Adrenergic Blockers GI Intolerance   • Verapamil Hcl Er GI Intolerance       Current Outpatient Medications:   •  losartan (COZAAR) 100 MG tablet, 100 mg Daily., Disp: , Rfl:   •  nitroglycerin (NITROSTAT) 0.4 MG SL tablet, 1 under the tongue as needed for angina, may repeat q5mins for up three doses (Patient taking differently: Place 0.4 mg under the tongue Every 5 (Five) Minutes As Needed. 1 under the tongue as needed for angina, may repeat q5mins for up three doses), Disp: 25 tablet, Rfl: 11  •  Probiotic Product (PROBIOTIC-10 PO), Take 1 tablet by mouth Daily., Disp: , Rfl:     History of Present Illness: Patient returns for scheduled 6-month  "follow up. She has been feeling well overall from a cardiovascular standpoint. Patient denies chest pain, shortness of breath, palpitations, edema, dizziness, and syncope. She reports she has continued to experience chronic fatigue and this has limited her activity. She reports her interim laboratory studies have been largely acceptable but her cortisol level is low. She has had no additional interim ER visits, hospitalizations, serious illnesses, or surgeries. She has been referred to an endocrinologist by her rheumatologist. She has received COVID immunization. She hand carries a log of her blood pressure readings and her blood pressure ranges from 130-150/75-82. She has diffuse arthralgias and myalgias and states she has severe unrelenting fatigue and does not do any activity on a regular basis.      ROS   Obtained and negative except as outlined in problem list and HPI.    Procedures       Objective:       Vitals:    08/27/21 1107 08/27/21 1111   BP: 172/91 162/92   BP Location: Left arm Left arm   Patient Position: Sitting Standing   Pulse: 66 70   SpO2: 99% 99%   Weight: 69.9 kg (154 lb)    Height: 162.6 cm (64\")      Body mass index is 26.43 kg/m².  Last weight: 138 lbs    Vitals reviewed.   Constitutional:       Appearance: Well-developed.   Neck:      Thyroid: No thyromegaly.      Vascular: No carotid bruit or JVD.      Lymphadenopathy: No cervical adenopathy.   Pulmonary:      Effort: Pulmonary effort is normal.      Breath sounds: Normal breath sounds. No wheezing. No rhonchi. No rales.   Cardiovascular:      Regular rhythm.      No gallop. No S3 gallop.   Pulses:     Dorsalis pedis: 2+ bilaterally.     Posterior tibial: 2+ bilaterally.  Edema:     Peripheral edema absent.   Abdominal:      Palpations: Abdomen is soft. There is no abdominal mass.      Tenderness: There is no abdominal tenderness.   Musculoskeletal: Normal range of motion. Skin:     General: Skin is warm and dry.      Findings: No rash. "   Neurological:      Mental Status: Alert and oriented to person, place, and time.           Lab Review:   Lab Results   Component Value Date    GLUCOSE 100 (H) 11/23/2020    BUN 16 11/23/2020    CREATININE 0.71 11/23/2020    EGFRIFAFRI 101 11/23/2020    BCR 22.5 11/23/2020     11/23/2020    K 3.8 11/23/2020     11/23/2020    MG 2.3 04/01/2020    CO2 25.0 11/23/2020    CALCIUM 9.9 11/23/2020    PROTENTOTREF 7.5 09/28/2020    ALBUMIN 4.60 11/23/2020    LABIL2 1.1 09/28/2020    AST 16 11/23/2020    ALT 9 11/23/2020       Lab Results   Component Value Date    WBC 5.24 11/23/2020    HGB 14.1 11/23/2020    HCT 44.2 11/23/2020    MCV 88.6 11/23/2020     11/23/2020       Lab Results   Component Value Date    HGBA1C 5.20 11/23/2020       Lab Results   Component Value Date    TSH 0.432 09/10/2020       Lab Results   Component Value Date    CHOL 187 11/23/2020     Lab Results   Component Value Date    TRIG 68 11/23/2020     Lab Results   Component Value Date    HDL 70 (H) 11/23/2020     Lab Results   Component Value Date     (H) 11/23/2020     CT Chest/Abdomen, 4/12/2021:  1.  There is a tiny noncalcified nodule seen on the left fissure with old healed granulomatous disease seen within the chest. Several enlarged lymph nodes in the right axillary region which are of uncertain significance or clinical etiology. Continued follow-up is recommended of the right axillary lymph nodes.  2.  Within the abdomen and pelvis there is cyst identified in the liver. Stool scattered throughout the colon suggesting clinical presentation of constipation. There is no evidence of acute intra-abdominal or pelvic abnormality present.        Assessment:       Overall continued acceptable course with no new interim cardiopulmonary complaints with acceptable functional status. We will defer additional diagnostic or therapeutic intervention from a cardiac perspective at this time. Hopefully we will be able to obtain any  upcoming laboratory study results for review with the patient by letter. She states she will be discussing her blood pressure readings with [facility] next week.     Diagnosis Plan   1. Hypertensive heart disease without heart failure  No recurrent angina pectoris or CHF on current activity schedule; continue current treatment   2. Palpitations  Stable and largely asymptomatic.   3. Chest pain with normal angiography  No recurrent angina pectoris or CHF on current activity schedule; continue current treatment   4. Muscle weakness  Stable.   5. Generalized body aches  Stable.          Plan:         1. Patient to continue current medications and close follow up with the above providers.  2. Patient is encouraged to implement a regular aerobic exercise routine, at least 30 minutes daily, 4-5 days per week.  3. Tentative cardiology follow up in February 2022 or patient may return sooner PRN.    I, Dennys Quintanilla, attest that this documentation has been prepared under the direction and in the presence of Paramjit Beckford MD 08/27/2021    I, Paramjit Beckford MD, [facility], personally performed the services described in this documentation as scribed by the above named individual in my presence, and it is both accurate and complete. At 11:36 EDT on 08/27/2021

## 2021-09-21 ENCOUNTER — TREATMENT (OUTPATIENT)
Dept: CARDIAC REHAB | Facility: HOSPITAL | Age: 61
End: 2021-09-21

## 2021-09-21 VITALS
HEIGHT: 64 IN | HEART RATE: 66 BPM | SYSTOLIC BLOOD PRESSURE: 156 MMHG | DIASTOLIC BLOOD PRESSURE: 100 MMHG | WEIGHT: 155.4 LBS | BODY MASS INDEX: 26.53 KG/M2

## 2021-09-21 DIAGNOSIS — Z00.00 PREVENTATIVE HEALTH CARE: ICD-10-CM

## 2021-09-21 RX ORDER — SPIRONOLACTONE 25 MG/1
12.5 TABLET ORAL DAILY
COMMUNITY
Start: 2021-09-17 | End: 2022-02-01

## 2021-09-21 NOTE — PROGRESS NOTES
Cardiac Rehab Initial Assessment      Name: Nancy Albert  :1960 Allergies:Beta adrenergic blockers and Verapamil hcl er   MRN: [id number] 60 y.o. Physician: Franklin Will MD   Primary Diagnosis:    Diagnosis Plan   1. Preventative health care      Event Date: 2020 Specialist: Dr. Beckford   Secondary Diagnosis: HTN Risk Stratification:Moderate Risk Note Author: Leilani Fuentes, RN     Cardiovascular History: Comments none     EXERCISE AT HOME  yes  20min  2 days per week    EF: nl%      Source: cath 2020          Ambulatory Status:Independent  Ambulatory Fall Risk Assessed on Initial Visit: yes 6 Minute Walk Pre- Cardiac Rehab:  Distance:naft      RPE:na  Max. HR: na       SPO2:na    MET: na  MPH: na             RPD: na  Resting BP: 156/100 LA, 186/90 RA    Peak BP: na  Recovery BP: na  Comments: na      NUTRITION  Lipids:yes If yes, labs as follows;  Total: No components found for: CHOLESTEROL  HDL:   HDL Cholesterol   Date Value Ref Range Status   2020 70 (H) 40 - 60 mg/dL Final    Lipids continued:  LDL:  LDL Cholesterol    Date Value Ref Range Status   2020 104 (H) 0 - 100 mg/dL Final     Triglyceride: No components found for: TRIGLYCERIDE   Weight Management:                 Weight: 155.4 lbs  Height: 64 inches                                   BMI: There is no height or weight on file to calculate BMI.  Waist Circumference: na  inches   Alcohol Use: none Diabetes:No    Last HGBA1C with date if applicable:No components found for: A1C         SOCIAL HISTORY  Social History     Socioeconomic History   • Marital status: Single     Spouse name: Not on file   • Number of children: Not on file   • Years of education: Not on file   • Highest education level: Not on file   Tobacco Use   • Smoking status: Never Smoker   • Smokeless tobacco: Never Used   Vaping Use   • Vaping Use: Never used   Substance and Sexual Activity   • Alcohol use: No   • Drug use: No   • Sexual activity: Defer        Educational Level (choose one that applies) na Learning Barriers:Ready to Learn    Family Support:yes    Living Arrangement: lives with their family    Risk Factors: Stress  Yes and na     Tobacco Adjunct: No  na      Comorbidities: none     PSYCHOSOCIAL  Clinical Depression: no    Stress: yes     Assess presence or absence of depression using a valid screening tool: yes      PHYSICAL ASSESSMENT  Influenza vaccine: yes  Pneumococcal vaccine: no          Angina: no    Describe angina scale of 0 - 4: 0 = none    Today are you having incisional pain? N/A. If, Yes, Scale: 0    na    Today are you having any other pain? No. If, Yes, Scale: na    na Diagnosed with Hypertension:yes    Heart Sounds: S1S2     Lung Sounds: normal air entry, lungs clear to auscultation         Assessment: na Orthopedic Problems: knee and back arthritis    Are you being hurt, hit, or frightened by anyone at home or in your life? no    Are you being neglected by a caregiver? No Shoulder flexibility/Range of motion: Average     Recommended arm activity: Any    Chair sit and reach within: 0 inches   Leg flexibility: Average    Leg Strength/Balance/Five times sit to stand: 0 seconds.   na  Chose one: Average    Recommended stretching: Chair    Assessment: na    Family attends IA: no Time of arrival: 0915  Time of departure: 1030     Patient Goals: Exercise 150 minutes per week by discharge         9/21/2021  09:47 EDT  Leilani Fuentes RN

## 2021-09-23 ENCOUNTER — TRANSCRIBE ORDERS (OUTPATIENT)
Dept: ADMINISTRATIVE | Facility: HOSPITAL | Age: 61
End: 2021-09-23

## 2021-09-23 DIAGNOSIS — Z12.31 VISIT FOR SCREENING MAMMOGRAM: Primary | ICD-10-CM

## 2021-09-25 ENCOUNTER — HOSPITAL ENCOUNTER (OUTPATIENT)
Dept: MAMMOGRAPHY | Facility: HOSPITAL | Age: 61
Discharge: HOME | End: 2021-09-25
Admitting: OBSTETRICS & GYNECOLOGY

## 2021-09-25 DIAGNOSIS — Z12.31 VISIT FOR SCREENING MAMMOGRAM: ICD-10-CM

## 2021-09-25 PROCEDURE — 77063 BREAST TOMOSYNTHESIS BI: CPT | Performed by: RADIOLOGY

## 2021-09-25 PROCEDURE — 77067 SCR MAMMO BI INCL CAD: CPT | Performed by: RADIOLOGY

## 2021-09-25 PROCEDURE — 77067 SCR MAMMO BI INCL CAD: CPT

## 2021-09-25 PROCEDURE — 77063 BREAST TOMOSYNTHESIS BI: CPT

## 2021-10-05 ENCOUNTER — TREATMENT (OUTPATIENT)
Dept: CARDIAC REHAB | Facility: HOSPITAL | Age: 61
End: 2021-10-05

## 2021-10-05 ENCOUNTER — APPOINTMENT (OUTPATIENT)
Dept: CARDIAC REHAB | Facility: HOSPITAL | Age: 61
End: 2021-10-05

## 2021-10-05 DIAGNOSIS — Z00.00 PREVENTATIVE HEALTH CARE: Primary | ICD-10-CM

## 2021-10-07 ENCOUNTER — APPOINTMENT (OUTPATIENT)
Dept: CARDIAC REHAB | Facility: HOSPITAL | Age: 61
End: 2021-10-07

## 2021-10-12 ENCOUNTER — TREATMENT (OUTPATIENT)
Dept: CARDIAC REHAB | Facility: HOSPITAL | Age: 61
End: 2021-10-12

## 2021-10-12 ENCOUNTER — APPOINTMENT (OUTPATIENT)
Dept: CARDIAC REHAB | Facility: HOSPITAL | Age: 61
End: 2021-10-12

## 2021-10-12 DIAGNOSIS — Z00.00 PREVENTATIVE HEALTH CARE: Primary | ICD-10-CM

## 2021-10-14 ENCOUNTER — APPOINTMENT (OUTPATIENT)
Dept: CARDIAC REHAB | Facility: HOSPITAL | Age: 61
End: 2021-10-14

## 2021-10-19 ENCOUNTER — APPOINTMENT (OUTPATIENT)
Dept: CARDIAC REHAB | Facility: HOSPITAL | Age: 61
End: 2021-10-19

## 2021-10-19 ENCOUNTER — TREATMENT (OUTPATIENT)
Dept: CARDIAC REHAB | Facility: HOSPITAL | Age: 61
End: 2021-10-19

## 2021-10-19 DIAGNOSIS — Z00.00 PREVENTATIVE HEALTH CARE: Primary | ICD-10-CM

## 2021-10-21 ENCOUNTER — APPOINTMENT (OUTPATIENT)
Dept: CARDIAC REHAB | Facility: HOSPITAL | Age: 61
End: 2021-10-21

## 2021-10-21 ENCOUNTER — TREATMENT (OUTPATIENT)
Dept: CARDIAC REHAB | Facility: HOSPITAL | Age: 61
End: 2021-10-21

## 2021-10-21 DIAGNOSIS — Z00.00 PREVENTATIVE HEALTH CARE: Primary | ICD-10-CM

## 2021-10-22 ENCOUNTER — TELEPHONE (OUTPATIENT)
Dept: CARDIOLOGY | Facility: CLINIC | Age: 61
End: 2021-10-22

## 2021-10-22 NOTE — TELEPHONE ENCOUNTER
Pt called and stated that her BP runs 150s/80s. At cardiac rehab 10/21/21 180s/90s.    Patient complains of dizziness/light-headedness (seems to be worsened since patient on spironolactone) and fatigue, which has been a problem for two years.  Pt has been trying to work with nephrology over BP but no one will return her calls. Patient has been on multiple BP medications that she has not been able to tolerate. (Amlodipine, Diltiazem, Verapamil)       Pt denies chest pain, SOB, palpitations, swelling.       Pt currently taking:  Spironolactone 25 mg daily  Losartan 100 mg daily    Please advise.

## 2021-10-26 ENCOUNTER — TREATMENT (OUTPATIENT)
Dept: CARDIAC REHAB | Facility: HOSPITAL | Age: 61
End: 2021-10-26

## 2021-10-26 ENCOUNTER — APPOINTMENT (OUTPATIENT)
Dept: CARDIAC REHAB | Facility: HOSPITAL | Age: 61
End: 2021-10-26

## 2021-10-26 DIAGNOSIS — Z00.00 PREVENTATIVE HEALTH CARE: Primary | ICD-10-CM

## 2021-10-28 ENCOUNTER — TREATMENT (OUTPATIENT)
Dept: CARDIAC REHAB | Facility: HOSPITAL | Age: 61
End: 2021-10-28

## 2021-10-28 ENCOUNTER — APPOINTMENT (OUTPATIENT)
Dept: CARDIAC REHAB | Facility: HOSPITAL | Age: 61
End: 2021-10-28

## 2021-10-28 DIAGNOSIS — Z00.00 PREVENTATIVE HEALTH CARE: Primary | ICD-10-CM

## 2021-11-02 ENCOUNTER — APPOINTMENT (OUTPATIENT)
Dept: CARDIAC REHAB | Facility: HOSPITAL | Age: 61
End: 2021-11-02

## 2021-11-04 ENCOUNTER — TREATMENT (OUTPATIENT)
Dept: CARDIAC REHAB | Facility: HOSPITAL | Age: 61
End: 2021-11-04

## 2021-11-04 ENCOUNTER — APPOINTMENT (OUTPATIENT)
Dept: CARDIAC REHAB | Facility: HOSPITAL | Age: 61
End: 2021-11-04

## 2021-11-04 DIAGNOSIS — Z00.00 PREVENTATIVE HEALTH CARE: Primary | ICD-10-CM

## 2021-11-09 ENCOUNTER — TREATMENT (OUTPATIENT)
Dept: CARDIAC REHAB | Facility: HOSPITAL | Age: 61
End: 2021-11-09

## 2021-11-09 ENCOUNTER — APPOINTMENT (OUTPATIENT)
Dept: CARDIAC REHAB | Facility: HOSPITAL | Age: 61
End: 2021-11-09

## 2021-11-09 DIAGNOSIS — Z00.00 PREVENTATIVE HEALTH CARE: Primary | ICD-10-CM

## 2021-11-11 ENCOUNTER — APPOINTMENT (OUTPATIENT)
Dept: CARDIAC REHAB | Facility: HOSPITAL | Age: 61
End: 2021-11-11

## 2021-11-16 ENCOUNTER — TREATMENT (OUTPATIENT)
Dept: CARDIAC REHAB | Facility: HOSPITAL | Age: 61
End: 2021-11-16

## 2021-11-16 ENCOUNTER — APPOINTMENT (OUTPATIENT)
Dept: CARDIAC REHAB | Facility: HOSPITAL | Age: 61
End: 2021-11-16

## 2021-11-16 DIAGNOSIS — Z00.00 PREVENTATIVE HEALTH CARE: Primary | ICD-10-CM

## 2021-11-18 ENCOUNTER — TREATMENT (OUTPATIENT)
Dept: CARDIAC REHAB | Facility: HOSPITAL | Age: 61
End: 2021-11-18

## 2021-11-18 ENCOUNTER — APPOINTMENT (OUTPATIENT)
Dept: CARDIAC REHAB | Facility: HOSPITAL | Age: 61
End: 2021-11-18

## 2021-11-18 DIAGNOSIS — Z00.00 PREVENTATIVE HEALTH CARE: Primary | ICD-10-CM

## 2021-11-23 ENCOUNTER — APPOINTMENT (OUTPATIENT)
Dept: CARDIAC REHAB | Facility: HOSPITAL | Age: 61
End: 2021-11-23

## 2021-11-30 ENCOUNTER — APPOINTMENT (OUTPATIENT)
Dept: CARDIAC REHAB | Facility: HOSPITAL | Age: 61
End: 2021-11-30

## 2021-11-30 ENCOUNTER — TREATMENT (OUTPATIENT)
Dept: CARDIAC REHAB | Facility: HOSPITAL | Age: 61
End: 2021-11-30

## 2021-11-30 DIAGNOSIS — Z00.00 PREVENTATIVE HEALTH CARE: Primary | ICD-10-CM

## 2021-12-02 ENCOUNTER — APPOINTMENT (OUTPATIENT)
Dept: CARDIAC REHAB | Facility: HOSPITAL | Age: 61
End: 2021-12-02

## 2021-12-02 ENCOUNTER — TREATMENT (OUTPATIENT)
Dept: CARDIAC REHAB | Facility: HOSPITAL | Age: 61
End: 2021-12-02

## 2021-12-02 DIAGNOSIS — Z00.00 PREVENTATIVE HEALTH CARE: Primary | ICD-10-CM

## 2021-12-07 ENCOUNTER — TREATMENT (OUTPATIENT)
Dept: CARDIAC REHAB | Facility: HOSPITAL | Age: 61
End: 2021-12-07

## 2021-12-07 ENCOUNTER — APPOINTMENT (OUTPATIENT)
Dept: CARDIAC REHAB | Facility: HOSPITAL | Age: 61
End: 2021-12-07

## 2021-12-07 DIAGNOSIS — Z00.00 PREVENTATIVE HEALTH CARE: Primary | ICD-10-CM

## 2021-12-08 ENCOUNTER — OFFICE VISIT (OUTPATIENT)
Dept: ENDOCRINOLOGY | Facility: CLINIC | Age: 61
End: 2021-12-08

## 2021-12-08 VITALS
HEART RATE: 81 BPM | HEIGHT: 65 IN | SYSTOLIC BLOOD PRESSURE: 130 MMHG | WEIGHT: 160 LBS | BODY MASS INDEX: 26.66 KG/M2 | OXYGEN SATURATION: 98 % | DIASTOLIC BLOOD PRESSURE: 86 MMHG

## 2021-12-08 DIAGNOSIS — R79.89 LOW SERUM CORTISOL LEVEL: Primary | ICD-10-CM

## 2021-12-08 PROCEDURE — 99203 OFFICE O/P NEW LOW 30 MIN: CPT | Performed by: INTERNAL MEDICINE

## 2021-12-08 RX ORDER — AMLODIPINE BESYLATE 5 MG/1
5 TABLET ORAL DAILY
COMMUNITY
Start: 2021-11-18

## 2021-12-08 RX ORDER — CHLORTHALIDONE 25 MG/1
TABLET ORAL
COMMUNITY
Start: 2021-11-12 | End: 2022-01-25

## 2021-12-08 NOTE — PROGRESS NOTES
Subjective   Nancy Albert is a 61 y.o. female who presents to [facility] care for Abnormal Lab    Chief complaint: fatigue      History of Present Illness     She notes fatigue for a couple of years.  She has seen multiple specialists including rheumatology.  She has been seeing rheumatologist for knee pain.  She notes lots of arthralgias, especially lower back.  She notes tingling in her arms, legs and plantar surface of feet.  She had cortisol level done 5/28/21 at 1:39PM that was 5.8 (normal AM value 6.2-10.4).  She had labs done 11/19/21 at 9:16AM.  The cortisol was 4.4.  She denies any recent steroid use.  She has been getting injections in her knee.  She was told it was not a steroid.  She has h/o gastroparesis - bacterial overgrowth of small intestine.  She was treated with abx.  She was losing weight but has gained some weight in the last few months.  She has h/o HTN and is seeing cardiologist.  She has required multiple meds for BP control.  She has noted some light-headedness.  She notes weakness.  She notes myalgias and arthralgias.  She had abd CT done 3/2021 that showed normal adrenal glands. She denies any prior h/o adrenal disease.  She denies any thyroid disease.  She had recent TSH that was normal.  She has been seen by nephrology.  Note was made of low phos and low vit D.    The following portions of the patient's history were reviewed and updated as appropriate: allergies, current medications, past family history, past medical history, past social history, past surgical history and problem list.    Review of Systems   Constitutional: Positive for fatigue.   HENT: Positive for tinnitus.    Eyes: Negative.    Respiratory: Negative.    Cardiovascular: Negative.    Gastrointestinal: Negative.    Endocrine: Negative.    Genitourinary: Positive for urgency.   Musculoskeletal: Positive for arthralgias, back pain and myalgias.   Skin: Negative.         Hair Loss   Allergic/Immunologic: Negative.   "  Neurological: Positive for dizziness, weakness, light-headedness and numbness.   Hematological: Negative.    Psychiatric/Behavioral: Negative.        Objective   Visit Vitals  /86   Pulse 81   Ht 164 cm (64.57\")   Wt 72.6 kg (160 lb)   SpO2 98%   BMI 26.98 kg/m²      Physical Exam  Constitutional:       Appearance: Normal appearance.   HENT:      Head: Normocephalic and atraumatic.   Eyes:      Extraocular Movements: Extraocular movements intact.      Conjunctiva/sclera: Conjunctivae normal.      Pupils: Pupils are equal, round, and reactive to light.   Neck:      Thyroid: No thyroid mass, thyromegaly or thyroid tenderness.   Cardiovascular:      Rate and Rhythm: Normal rate and regular rhythm.      Pulses: Normal pulses.      Heart sounds: Normal heart sounds.   Pulmonary:      Effort: Pulmonary effort is normal.      Breath sounds: Normal breath sounds.   Abdominal:      General: Bowel sounds are normal.      Palpations: Abdomen is soft.   Musculoskeletal:         General: Normal range of motion.      Cervical back: Normal range of motion and neck supple.   Lymphadenopathy:      Cervical: No cervical adenopathy.   Skin:     General: Skin is warm and dry.   Neurological:      General: No focal deficit present.      Mental Status: She is alert.   Psychiatric:         Mood and Affect: Mood normal.         Behavior: Behavior normal.         Thought Content: Thought content normal.         Judgment: Judgment normal.         Assessment/Plan     Diagnoses and all orders for this visit:    1. Low serum cortisol level (HCC) (Primary)  Assessment & Plan:  She had low-rod cortisol level but this was drawn in the afternoon.  She had another cortisol done last month - true AM value that was low at 4.4.  She doesn't appear to have adrenal insufficiency clinically given recent weight gain and severe HTN requiring multiple meds.  We discussed doing cortrosyn-stimulation testing.  Will schedule this.    Orders:  -     " Cortisol, ACTH Stimulation; Future               Return for Will schedule follow up based on results of ignacio-stim..    Stephen Molina MD   12/08/2021

## 2021-12-08 NOTE — ASSESSMENT & PLAN NOTE
She had low-rod cortisol level but this was drawn in the afternoon.  She had another cortisol done last month - true AM value that was low at 4.4.  She doesn't appear to have adrenal insufficiency clinically given recent weight gain and severe HTN requiring multiple meds.  We discussed doing cortrosyn-stimulation testing.  Will schedule this.

## 2021-12-09 ENCOUNTER — TREATMENT (OUTPATIENT)
Dept: CARDIAC REHAB | Facility: HOSPITAL | Age: 61
End: 2021-12-09

## 2021-12-09 ENCOUNTER — APPOINTMENT (OUTPATIENT)
Dept: CARDIAC REHAB | Facility: HOSPITAL | Age: 61
End: 2021-12-09

## 2021-12-09 DIAGNOSIS — Z00.00 PREVENTATIVE HEALTH CARE: Primary | ICD-10-CM

## 2021-12-10 ENCOUNTER — LAB (OUTPATIENT)
Dept: ENDOCRINOLOGY | Facility: CLINIC | Age: 61
End: 2021-12-10

## 2021-12-10 ENCOUNTER — LAB (OUTPATIENT)
Dept: LAB | Facility: HOSPITAL | Age: 61
End: 2021-12-10

## 2021-12-10 DIAGNOSIS — R79.89 LOW SERUM CORTISOL LEVEL: Primary | ICD-10-CM

## 2021-12-10 DIAGNOSIS — R79.89 LOW SERUM CORTISOL LEVEL: ICD-10-CM

## 2021-12-10 PROCEDURE — 82533 TOTAL CORTISOL: CPT

## 2021-12-10 PROCEDURE — 82533 TOTAL CORTISOL: CPT | Performed by: INTERNAL MEDICINE

## 2021-12-10 PROCEDURE — 96372 THER/PROPH/DIAG INJ SC/IM: CPT | Performed by: INTERNAL MEDICINE

## 2021-12-10 RX ORDER — COSYNTROPIN 0.25 MG/ML
0.25 INJECTION, POWDER, FOR SOLUTION INTRAMUSCULAR; INTRAVENOUS ONCE
Status: COMPLETED | OUTPATIENT
Start: 2021-12-10 | End: 2021-12-10

## 2021-12-10 RX ADMIN — COSYNTROPIN 0.25 MG: 0.25 INJECTION, POWDER, FOR SOLUTION INTRAMUSCULAR; INTRAVENOUS at 10:15

## 2021-12-11 LAB
CORTIS SERPL-MCNC: 20.82 MCG/DL
CORTIS SERPL-MCNC: 23.95 MCG/DL
CORTIS SERPL-MCNC: 5.06 MCG/DL

## 2021-12-14 ENCOUNTER — TREATMENT (OUTPATIENT)
Dept: CARDIAC REHAB | Facility: HOSPITAL | Age: 61
End: 2021-12-14

## 2021-12-14 ENCOUNTER — APPOINTMENT (OUTPATIENT)
Dept: CARDIAC REHAB | Facility: HOSPITAL | Age: 61
End: 2021-12-14

## 2021-12-14 DIAGNOSIS — Z00.00 PREVENTATIVE HEALTH CARE: Primary | ICD-10-CM

## 2021-12-16 ENCOUNTER — TREATMENT (OUTPATIENT)
Dept: CARDIAC REHAB | Facility: HOSPITAL | Age: 61
End: 2021-12-16

## 2021-12-16 ENCOUNTER — APPOINTMENT (OUTPATIENT)
Dept: CARDIAC REHAB | Facility: HOSPITAL | Age: 61
End: 2021-12-16

## 2021-12-16 DIAGNOSIS — Z00.00 PREVENTATIVE HEALTH CARE: Primary | ICD-10-CM

## 2021-12-17 ENCOUNTER — TELEPHONE (OUTPATIENT)
Dept: ENDOCRINOLOGY | Facility: CLINIC | Age: 61
End: 2021-12-17

## 2021-12-21 ENCOUNTER — APPOINTMENT (OUTPATIENT)
Dept: CARDIAC REHAB | Facility: HOSPITAL | Age: 61
End: 2021-12-21

## 2021-12-21 ENCOUNTER — TRANSCRIBE ORDERS (OUTPATIENT)
Dept: CARDIAC REHAB | Facility: HOSPITAL | Age: 61
End: 2021-12-21

## 2021-12-21 DIAGNOSIS — Z00.00 PREVENTATIVE HEALTH CARE: Primary | ICD-10-CM

## 2021-12-23 ENCOUNTER — APPOINTMENT (OUTPATIENT)
Dept: CARDIAC REHAB | Facility: HOSPITAL | Age: 61
End: 2021-12-23

## 2021-12-28 ENCOUNTER — APPOINTMENT (OUTPATIENT)
Dept: CARDIAC REHAB | Facility: HOSPITAL | Age: 61
End: 2021-12-28

## 2021-12-30 ENCOUNTER — APPOINTMENT (OUTPATIENT)
Dept: CARDIAC REHAB | Facility: HOSPITAL | Age: 61
End: 2021-12-30

## 2022-01-04 ENCOUNTER — TREATMENT (OUTPATIENT)
Dept: CARDIAC REHAB | Facility: HOSPITAL | Age: 62
End: 2022-01-04

## 2022-01-04 ENCOUNTER — TRANSCRIBE ORDERS (OUTPATIENT)
Dept: GENERAL RADIOLOGY | Facility: HOSPITAL | Age: 62
End: 2022-01-04

## 2022-01-04 ENCOUNTER — HOSPITAL ENCOUNTER (OUTPATIENT)
Dept: GENERAL RADIOLOGY | Facility: HOSPITAL | Age: 62
Discharge: HOME | End: 2022-01-04
Admitting: NURSE PRACTITIONER

## 2022-01-04 ENCOUNTER — APPOINTMENT (OUTPATIENT)
Dept: CARDIAC REHAB | Facility: HOSPITAL | Age: 62
End: 2022-01-04

## 2022-01-04 DIAGNOSIS — Z00.00 PREVENTATIVE HEALTH CARE: ICD-10-CM

## 2022-01-04 DIAGNOSIS — E83.52 HYPERCALCEMIA: Primary | ICD-10-CM

## 2022-01-04 PROCEDURE — 71046 X-RAY EXAM CHEST 2 VIEWS: CPT

## 2022-01-05 ENCOUNTER — TELEPHONE (OUTPATIENT)
Dept: ENDOCRINOLOGY | Facility: CLINIC | Age: 62
End: 2022-01-05

## 2022-01-05 NOTE — TELEPHONE ENCOUNTER
PT CALLED TO SAY THAT SHE WANTED A NOTE TO GO TO HER NEW PCP DR VIKTORIYA RAMESH WITH [facility]

## 2022-01-06 ENCOUNTER — APPOINTMENT (OUTPATIENT)
Dept: CARDIAC REHAB | Facility: HOSPITAL | Age: 62
End: 2022-01-06

## 2022-01-11 ENCOUNTER — APPOINTMENT (OUTPATIENT)
Dept: CARDIAC REHAB | Facility: HOSPITAL | Age: 62
End: 2022-01-11

## 2022-01-13 ENCOUNTER — APPOINTMENT (OUTPATIENT)
Dept: CARDIAC REHAB | Facility: HOSPITAL | Age: 62
End: 2022-01-13

## 2022-01-18 ENCOUNTER — APPOINTMENT (OUTPATIENT)
Dept: CARDIAC REHAB | Facility: HOSPITAL | Age: 62
End: 2022-01-18

## 2022-01-20 ENCOUNTER — APPOINTMENT (OUTPATIENT)
Dept: CARDIAC REHAB | Facility: HOSPITAL | Age: 62
End: 2022-01-20

## 2022-01-25 ENCOUNTER — APPOINTMENT (OUTPATIENT)
Dept: CARDIAC REHAB | Facility: HOSPITAL | Age: 62
End: 2022-01-25

## 2022-01-25 ENCOUNTER — OFFICE VISIT (OUTPATIENT)
Dept: ORTHOPEDIC SURGERY | Facility: CLINIC | Age: 62
End: 2022-01-25

## 2022-01-25 VITALS
BODY MASS INDEX: 26.49 KG/M2 | HEIGHT: 65 IN | DIASTOLIC BLOOD PRESSURE: 80 MMHG | WEIGHT: 159 LBS | SYSTOLIC BLOOD PRESSURE: 124 MMHG

## 2022-01-25 DIAGNOSIS — M17.0 PRIMARY OSTEOARTHRITIS OF BOTH KNEES: ICD-10-CM

## 2022-01-25 DIAGNOSIS — M25.561 PAIN IN BOTH KNEES, UNSPECIFIED CHRONICITY: Primary | ICD-10-CM

## 2022-01-25 DIAGNOSIS — M25.562 PAIN IN BOTH KNEES, UNSPECIFIED CHRONICITY: Primary | ICD-10-CM

## 2022-01-25 PROCEDURE — 99204 OFFICE O/P NEW MOD 45 MIN: CPT | Performed by: PHYSICIAN ASSISTANT

## 2022-01-25 NOTE — PROGRESS NOTES
[facility] Note    Subjective     Chief Complaint   Patient presents with   • Right Knee - Pain   • Left Knee - Pain        HPI  Nancy Albert is a 61 y.o. female.  New patient presents for evaluation of bilateral knee pain.  Symptoms/pain have been ongoing for 5 years.  ABDIAZIZ: No history of injury or trauma.  Patient states she is undergone corticosteroid injections to the right knee and they usually take 1 to 2 weeks to work with her last injection being 12/27/2021.  She reports still being in pain.  She did try gel injections in 2018 to the right and it helped.  She is interested in seeing what can be done for both knees today.    Pain scale: 3/10.  Severity of the pain moderate.  Quality of the pain aching.  Associated symptoms grinding, stiffness.  Activity related to pain walking, sitting, stair climbing, sleeping, rising from a seated position.  Pain relieved by resting, medication.  No reported numbness or tingling.  Prior treatments 2018 gel injection right knee.  Multiple corticosteroid injections right knee with the last one being September and December 2021.  Patient's main complaints at this time are pain with using stairs and popping and grinding that she feels to the knee.    No reported mechanical symptoms, such as locking or catching.    Denies fever, chills, night sweats or other constitutional symptoms.      Past Medical History:   Diagnosis Date   • Arthritis 2019   • Gastroparesis    • GERD (gastroesophageal reflux disease)    • Heart murmur    • Hypertension       Past Surgical History:   Procedure Laterality Date   • CARDIAC CATHETERIZATION N/A 11/23/2020    Procedure: Left Heart Cath;  Surgeon: Checo Groves IV, MD;  Location: [facility] INVASIVE LOCATION;  Service: Cardiology;  Laterality: N/A;   • COLONOSCOPY  02/2020   • ENDOSCOPY     • HYSTERECTOMY  07/15/2008    Partial   • OOPHORECTOMY Left 07/15/2008   • WISDOM TOOTH EXTRACTION        Family  History   Problem Relation Age of Onset   • Cancer Mother    • Pancreatic cancer Mother    • Hypertension Mother    • Arthritis Father    • Hypertension Father    • No Known Problems Sister    • No Known Problems Brother    • Stroke Maternal Grandmother    • No Known Problems Maternal Grandfather    • No Known Problems Paternal Grandmother    • No Known Problems Paternal Grandfather    • No Known Problems Daughter    • Breast cancer Neg Hx    • Ovarian cancer Neg Hx      Social History     Socioeconomic History   • Marital status: Single   Tobacco Use   • Smoking status: Never Smoker   • Smokeless tobacco: Never Used   Vaping Use   • Vaping Use: Never used   Substance and Sexual Activity   • Alcohol use: No   • Drug use: No   • Sexual activity: Defer      Current Outpatient Medications on File Prior to Visit   Medication Sig Dispense Refill   • amLODIPine (NORVASC) 5 MG tablet      • losartan (COZAAR) 100 MG tablet 100 mg Daily.     • nitroglycerin (NITROSTAT) 0.4 MG SL tablet 1 under the tongue as needed for angina, may repeat q5mins for up three doses (Patient taking differently: Place 0.4 mg under the tongue Every 5 (Five) Minutes As Needed. 1 under the tongue as needed for angina, may repeat q5mins for up three doses) 25 tablet 11   • Probiotic Product (PROBIOTIC-10 PO) Take 1 tablet by mouth Daily.     • spironolactone (ALDACTONE) 25 MG tablet Take 12.5 mg by mouth Daily.       No current facility-administered medications on file prior to visit.      Allergies   Allergen Reactions   • Beta Adrenergic Blockers GI Intolerance   • Verapamil Hcl Er GI Intolerance        The following portions of the patient's history were reviewed and updated as appropriate: allergies, current medications, past family history, past medical history, past social history, past surgical history and problem list.    Review of Systems   Constitutional: Positive for fatigue.   Eyes: Negative.    Respiratory: Negative.    Cardiovascular:  "Negative.    Gastrointestinal: Positive for abdominal pain.   Endocrine: Negative.    Genitourinary: Positive for urgency.   Musculoskeletal: Positive for arthralgias, back pain and neck pain.   Skin: Negative.    Allergic/Immunologic: Negative.    Neurological: Positive for light-headedness and headaches.   Hematological: Negative.    Psychiatric/Behavioral: Negative.         Objective      Physical Exam  /80   Ht 164 cm (64.57\")   Wt 72.1 kg (159 lb)   BMI 26.82 kg/m²     Body mass index is 26.82 kg/m².    GENERAL APPEARANCE: awake, alert & oriented x 3, in no acute distress and well developed, well nourished  PSYCH: normal mood and affect  LUNGS:  breathing nonlabored, no wheezing  EYES: sclera anicteric, pupils equal  CARDIOVASCULAR: palpable pulses. Capillary refill less than 2 seconds  INTEGUMENTARY: skin intact, no clubbing, cyanosis  NEUROLOGIC:  Normal Sensation         Ortho Exam  Integument:   Bilateral knee: No skin lesions, no rash, no ecchymosis    Neurologic:   Sensation:    Bilateral foot: Intact to light touch on the dorsal and plantar aspect   Motor:    Bilateral lower extremity: 5/5 quadriceps, hamstrings, ankle dorsiflexors, and ankle plantar flexors    Vascular:   Bilateral lower extremity: 2+ dorsalis pedis pulse, prompt capillary refill    Lower Extremities:   Bilateral knee:    Tenderness:  Predominant tenderness noted barry-/retropatellar.  No significant medial or lateral joint line tenderness.    Effusion:  None    Swelling:  None    Crepitus:  Positive    Atrophy:  None    Range of motion:  Extension: 0°       Flexion: 120°    Instability:  No varus laxity, no valgus laxity, negative anterior drawer    Deformities:  Fairly neutral alignment      Imaging/Studies  Ordered bilateral knees plain films.  Imaging read/interpreted by Dr. Mast.    Imaging Results (Last 7 Days)     Procedure Component Value Units Date/Time    XR Knee 4+ View Bilateral [[id number]] Resulted: 01/25/22 " 1407     Updated: 01/25/22 1408    Narrative:      Knee X-Ray    Indication: Pain    Study:  Upright AP, Skiers, Lateral, and Sunrise views of Bilateral   knee(s)    Comparison: None    Findings:    Patient appears to have mild early moderate degenerative changes in the   medial compartment.    There are mild degenerative changes in the lateral compartment.    There are moderate changes in the patellofemoral compartment.    Patient has overall neutral alignment in the left knee and neutral to   slight valgus alignment in the right knee.    Kellgren-Mark ndGndrndanddndend:nd nd2nd Impression:   Mild to early moderate medial compartment and moderate patellofemoral   compartment degnerative changes of the knee             Assessment/Plan        ICD-10-CM ICD-9-CM   1. Pain in both knees, unspecified chronicity  M25.561 719.46    M25.562    2. Primary osteoarthritis of both knees  M17.0 715.16       Orders Placed This Encounter   Procedures   • Large Joint Arthrocentesis   • XR Knee 4+ View Bilateral        -Chronic pain both knees due to primary osteoarthritis, mild to moderate.  Patient just recently underwent corticosteroid injection to the right knee, still having pain but doing better.  -Discussion had regarding gel injections.  She did receive gel injections to her right knee in 2018 which helped until last year.  She is interested in trying them again but unsure if insurance will cover them.  -Placed preauthorization for visco supplementation.  -Prescribed Voltaren gel.  -Recommend ice, heat, elevation.  -Follow up when injections are available to be started.  -Questions and concerns answered.      Medical Decision Making  Management Options : prescription/IM medicine  Data/Risk: radiology tests       Genie Santos PA-C  01/28/22  09:15 EST               EMR Dragon/Transcription disclaimer:  Much of this encounter note is an electronic transcription of spoken language to printed text. Electronic transcription of  spoken language may permit erroneous, or at times, nonsensical words or phrases to be inadvertently transcribed. Although I have reviewed the note for such errors, some may still exist.

## 2022-01-27 ENCOUNTER — APPOINTMENT (OUTPATIENT)
Dept: CARDIAC REHAB | Facility: HOSPITAL | Age: 62
End: 2022-01-27

## 2022-02-01 ENCOUNTER — TREATMENT (OUTPATIENT)
Dept: CARDIAC REHAB | Facility: HOSPITAL | Age: 62
End: 2022-02-01

## 2022-02-01 ENCOUNTER — APPOINTMENT (OUTPATIENT)
Dept: CARDIAC REHAB | Facility: HOSPITAL | Age: 62
End: 2022-02-01

## 2022-02-01 DIAGNOSIS — Z00.00 PREVENTATIVE HEALTH CARE: Primary | ICD-10-CM

## 2022-02-01 RX ORDER — SPIRONOLACTONE 25 MG/1
TABLET ORAL
Qty: 30 TABLET | Refills: 0 | Status: SHIPPED | OUTPATIENT
Start: 2022-02-01 | End: 2022-03-02 | Stop reason: ALTCHOICE

## 2022-02-03 ENCOUNTER — APPOINTMENT (OUTPATIENT)
Dept: CARDIAC REHAB | Facility: HOSPITAL | Age: 62
End: 2022-02-03

## 2022-02-08 ENCOUNTER — TREATMENT (OUTPATIENT)
Dept: CARDIAC REHAB | Facility: HOSPITAL | Age: 62
End: 2022-02-08

## 2022-02-08 ENCOUNTER — APPOINTMENT (OUTPATIENT)
Dept: CARDIAC REHAB | Facility: HOSPITAL | Age: 62
End: 2022-02-08

## 2022-02-08 DIAGNOSIS — Z00.00 PREVENTATIVE HEALTH CARE: Primary | ICD-10-CM

## 2022-02-10 ENCOUNTER — TREATMENT (OUTPATIENT)
Dept: CARDIAC REHAB | Facility: HOSPITAL | Age: 62
End: 2022-02-10

## 2022-02-10 ENCOUNTER — APPOINTMENT (OUTPATIENT)
Dept: CARDIAC REHAB | Facility: HOSPITAL | Age: 62
End: 2022-02-10

## 2022-02-10 DIAGNOSIS — Z00.00 PREVENTATIVE HEALTH CARE: Primary | ICD-10-CM

## 2022-02-15 ENCOUNTER — APPOINTMENT (OUTPATIENT)
Dept: CARDIAC REHAB | Facility: HOSPITAL | Age: 62
End: 2022-02-15

## 2022-02-17 ENCOUNTER — APPOINTMENT (OUTPATIENT)
Dept: CARDIAC REHAB | Facility: HOSPITAL | Age: 62
End: 2022-02-17

## 2022-02-22 ENCOUNTER — APPOINTMENT (OUTPATIENT)
Dept: CARDIAC REHAB | Facility: HOSPITAL | Age: 62
End: 2022-02-22

## 2022-02-22 ENCOUNTER — TREATMENT (OUTPATIENT)
Dept: CARDIAC REHAB | Facility: HOSPITAL | Age: 62
End: 2022-02-22

## 2022-02-22 DIAGNOSIS — Z00.00 PREVENTATIVE HEALTH CARE: Primary | ICD-10-CM

## 2022-02-24 ENCOUNTER — APPOINTMENT (OUTPATIENT)
Dept: CARDIAC REHAB | Facility: HOSPITAL | Age: 62
End: 2022-02-24

## 2022-02-24 ENCOUNTER — TREATMENT (OUTPATIENT)
Dept: CARDIAC REHAB | Facility: HOSPITAL | Age: 62
End: 2022-02-24

## 2022-02-24 DIAGNOSIS — Z00.00 PREVENTATIVE HEALTH CARE: Primary | ICD-10-CM

## 2022-03-01 ENCOUNTER — APPOINTMENT (OUTPATIENT)
Dept: CARDIAC REHAB | Facility: HOSPITAL | Age: 62
End: 2022-03-01

## 2022-03-02 ENCOUNTER — OFFICE VISIT (OUTPATIENT)
Dept: CARDIOLOGY | Facility: CLINIC | Age: 62
End: 2022-03-02

## 2022-03-02 VITALS
HEART RATE: 83 BPM | BODY MASS INDEX: 27.83 KG/M2 | DIASTOLIC BLOOD PRESSURE: 88 MMHG | WEIGHT: 163 LBS | OXYGEN SATURATION: 99 % | HEIGHT: 64 IN | SYSTOLIC BLOOD PRESSURE: 159 MMHG

## 2022-03-02 DIAGNOSIS — R00.2 PALPITATIONS: ICD-10-CM

## 2022-03-02 DIAGNOSIS — I11.9 HYPERTENSIVE HEART DISEASE WITHOUT HEART FAILURE: Primary | ICD-10-CM

## 2022-03-02 DIAGNOSIS — M62.81 MUSCLE WEAKNESS: ICD-10-CM

## 2022-03-02 DIAGNOSIS — R07.9 CHEST PAIN WITH NORMAL ANGIOGRAPHY: ICD-10-CM

## 2022-03-02 PROCEDURE — 99214 OFFICE O/P EST MOD 30 MIN: CPT | Performed by: INTERNAL MEDICINE

## 2022-03-02 RX ORDER — HYDRALAZINE HYDROCHLORIDE 10 MG/1
TABLET, FILM COATED ORAL
COMMUNITY
Start: 2022-03-01 | End: 2022-06-24

## 2022-03-02 NOTE — PROGRESS NOTES
Subjective:     Encounter Date:03/02/2022    Patient ID: Nancy Albert is a 61 y.o. single -American female from [place], retired from the [employer].     PHYSICIAN: Franklin Will MD  SLEEP PHYSICIAN: [facility]  REFERRING HEALTHCARE PROVIDER: ANA Theodore  GI PHYSICIAN:  Nicola Mukherjee MD .  HEMATOLOGIST: Bhupendra De La Cruz MD  INTERVENTIONAL CARDIOLOGIST: Checo Groves IV, MD, [facility]  NEPHROLOGIST: Reji Castillo MD/ [facility]  RHEUMATOLOGIST: Heavenly Carroll MD    Chief Complaint:   Chief Complaint   Patient presents with   • Hypertensive heart disease without heart failure   • Essential hypertension   • Palpitations       Problem List:  1. Hypertensive cardiovascular disease:  a. Graded exercise test, 2/11/2016: The maximal exercise stress test negative by ST criteria.  No precordial  symptoms developed.  Normal systolic blood pressure response.  Normal exercise tolerance.  No ventricular ectopy recorded.  Clinical correlation required.  b. Remote 24-hour Holter monitor approximately 2016 with her physician: Normal-data deficit   c. Stress echocardiogram, 10/12/2018: Normal stress echo with no significant echocardiographic evidence for myocardial ischemia, EF 0.56-0.60  d. Holter monitor, 9/6/2018: Abnormal monitor study with one episode of nonsustained V. tach, 6 beats in duration.  Occasional PVCs.  Rare, brief episodes of PAT.  Patient triggered event related was sinus rhythm  e. 24-hour ambulatory blood pressure monitor November 2018: Average blood pressure 100-120/60-70 torr, heart rate 60-70 bpm, maximum blood pressure 160/85  f. Residual class I symptoms with intermittent random atypical chest pain, March 2019, September 2020   g. Stress echo, 10/5/2020: Acceptable negative echocardiographic exercise stress test  h. LHC, 11/23/2020: Essentially normal coronary arteries with normal LV filling  pressure.  i. CCS class I atypical chest pain syndrome with NYHA class II exertional dyspnea and fatigue, February 2021, August 2021, March 2022  2. Hypertension - probably essential with multiple drug intolerances.  3. Apparent asymptomatic nonsustained ventricular tachycardia during hypokalemia, incidentally found on Holter monitor, September 2018, [employer] 5/12/2020-5/26/2020: Heart rate ranged between  bpm with average 71 bpm, rare atrial and ventricular ectopy, first-degree AV block intermittently, and 1 run of SVT lasting 9 beats at a maximum of 152 bpm but was asymptomatic.  No evidence of atrial fibrillation/flutter, V. tach, pauses, or ischemic ST-T changes.  4. Palpitations with Holter Monitor (9/6/2018): Abnormal monitor study with one episode of nonsustained V. tach, 6 beats in duration.  Occasional PVCs.  Rare, brief episodes of PAT, with recent increased severity of palpitation and event monitor completed - data deficit, April 2020   5. Cardiac murmur with acceptable echocardiogram, October 2018  6. At risk for sleep apnea with negative sleep study in 2017-data deficit  7. Acid reflux, diagnosed August 2019, with repeat ED visits winter 2020 with abdominal pain and continued GI evaluation and treatment, April 2020  8. Leukopenia August 2020  9. Surgical history: KATI, left oophorectomy    Allergies   Allergen Reactions   • Beta Adrenergic Blockers GI Intolerance   • Verapamil Hcl Er GI Intolerance       Current Outpatient Medications:   •  amLODIPine (NORVASC) 5 MG tablet, , Disp: , Rfl:   •  Diclofenac Sodium (VOLTAREN) 1 % gel gel, Apply 4 g topically to the appropriate area as directed 2 (Two) Times a Day., Disp: 150 g, Rfl: 5  •  hydrALAZINE (APRESOLINE) 10 MG tablet, , Disp: , Rfl:   •  losartan (COZAAR) 100 MG tablet, 100 mg Daily., Disp: , Rfl:   •  nitroglycerin (NITROSTAT) 0.4 MG SL tablet, 1 under the tongue as needed for angina, may repeat q5mins for up three doses (Patient taking  "differently: Place 0.4 mg under the tongue Every 5 (Five) Minutes As Needed. 1 under the tongue as needed for angina, may repeat q5mins for up three doses), Disp: 25 tablet, Rfl: 11  •  Probiotic Product (PROBIOTIC-10 PO), Take 1 tablet by mouth Daily., Disp: , Rfl:     History of Present Illness: Patient returns for scheduled 6-month follow up.  She is overall doing well but continues to note significant fatigue.  She is undergoing physical therapy for back 1-2 times weekly participates in cardiac rehabilitation twice weekly.  She shares recent laboratory study results from [facility]; see below.  She additionally had an unremarkable chest x-ray 2 months ago.  She denies current symptoms of angina pectoris, CHF, TIA, or claudication.  She is using DIGESTGOLD to help assist her GI function with significant relief.  She previously was using a ginger product but had significant reflux.  No interim medical or surgical events and no ED assessments.  She states that she is scheduled to initiate hydralazine treatment for hypertension by [facility]; data deficit.        ROS   Obtained and negative except as outlined in problem list and HPI.    Procedures       Objective:       Vitals:    03/02/22 1015 03/02/22 1016   BP: 170/92 159/88   BP Location: Left arm Left arm   Patient Position: Standing Sitting   Pulse: 86 83   SpO2: 99% 99%   Weight: 73.9 kg (163 lb) 73.9 kg (163 lb)   Height: 162.6 cm (64\") 162.6 cm (64\")   Repeat blood pressure 140/76 left arm sitting.  Body mass index is 27.98 kg/m².  Last weight: 154 lbs    Vitals reviewed.   Constitutional:       Appearance: Well-developed.   Neck:      Thyroid: No thyromegaly.      Vascular: No carotid bruit or JVD.      Lymphadenopathy: No cervical adenopathy.   Pulmonary:      Effort: Pulmonary effort is normal.      Breath sounds: Normal breath sounds. No wheezing. No rhonchi. No rales.   Cardiovascular:      Regular rhythm.      Murmurs: There is a " mid frequency midsystolic murmur at the URSB.      No gallop. No S3 gallop.   Pulses:     Dorsalis pedis: 2+ bilaterally.     Posterior tibial: 2+ bilaterally.  Edema:     Peripheral edema absent.   Abdominal:      Palpations: Abdomen is soft. There is no abdominal mass.      Tenderness: There is no abdominal tenderness.   Musculoskeletal: Normal range of motion. Skin:     General: Skin is warm and dry.      Findings: No rash.   Neurological:      Mental Status: Alert and oriented to person, place, and time.           Lab Review:     Mammogram, 9/25/2021:  BI-RADS CATEGORY: 2, BENIGN    XR Chest, 1/4/2022: No acute cardiopulmonary disease.    XR Bilateral Knees, 1/25/2022:  Mild to early moderate medial compartment and moderate patellofemoral compartment degnerative changes of the knee     12/10/2021: Cortisol 23.95    2/23/2022:  · Cortisol 4.9  · Urinalysis unremarkable except trace occult blood, WBC WNL, RBC 3-10, no casts and few bacteria  · Vitamin D-21.1  · PTH 54  · CBC, WBC 5.1, RBC 4.59, hemoglobin 12.9, hematocrit 39, MCV 85, MCH 28.1, MCHC 33.1, RDW 12.4, platelets 213, neutrophils 62,/32, monocytes 5, eos 1, basos 0  · Renal panel: Glucose 94, BUN 14, creatinine 0.74, , sodium 137, potassium 4.4, chloride 101, carbon dioxide 31, calcium 9.9, phosphorus 3.2, albumin 4.6    Assessment:       Overall continued acceptable course with no new interim cardiopulmonary complaints with acceptable functional status. We will defer additional diagnostic or therapeutic intervention from a cardiac perspective at this time.     Diagnosis Plan   1. Hypertensive heart disease without heart failure  No recurrent angina pectoris or CHF on current activity schedule; continue current treatment   2. Palpitations  No current breakthrough symptoms, continue current treatment   3. Chest pain with normal angiography  No recurrent angina pectoris or CHF on current activity schedule; continue current treatment   4. Muscle  weakness  Continue physical activity and daily walking as tolerated          Plan:         1. Patient to continue current medications and close follow up with the above providers.  2. Tentative cardiology follow up in September 2022 or patient may return sooner PRN.    I, Paramjit Beckford MD, FACC, personally performed the services described in this documentation as scribed by the above named individual in my presence, and it is both accurate and complete. At 13:02 EST on 03/02/2022

## 2022-03-03 ENCOUNTER — APPOINTMENT (OUTPATIENT)
Dept: CARDIAC REHAB | Facility: HOSPITAL | Age: 62
End: 2022-03-03

## 2022-03-03 ENCOUNTER — TREATMENT (OUTPATIENT)
Dept: CARDIAC REHAB | Facility: HOSPITAL | Age: 62
End: 2022-03-03

## 2022-03-03 DIAGNOSIS — Z00.00 PREVENTATIVE HEALTH CARE: Primary | ICD-10-CM

## 2022-03-08 ENCOUNTER — APPOINTMENT (OUTPATIENT)
Dept: CARDIAC REHAB | Facility: HOSPITAL | Age: 62
End: 2022-03-08

## 2022-03-08 ENCOUNTER — TREATMENT (OUTPATIENT)
Dept: CARDIAC REHAB | Facility: HOSPITAL | Age: 62
End: 2022-03-08

## 2022-03-08 DIAGNOSIS — Z00.00 PREVENTATIVE HEALTH CARE: Primary | ICD-10-CM

## 2022-03-10 ENCOUNTER — TREATMENT (OUTPATIENT)
Dept: CARDIAC REHAB | Facility: HOSPITAL | Age: 62
End: 2022-03-10

## 2022-03-10 ENCOUNTER — APPOINTMENT (OUTPATIENT)
Dept: CARDIAC REHAB | Facility: HOSPITAL | Age: 62
End: 2022-03-10

## 2022-03-10 DIAGNOSIS — Z00.00 PREVENTATIVE HEALTH CARE: Primary | ICD-10-CM

## 2022-03-15 ENCOUNTER — APPOINTMENT (OUTPATIENT)
Dept: CARDIAC REHAB | Facility: HOSPITAL | Age: 62
End: 2022-03-15

## 2022-03-17 ENCOUNTER — APPOINTMENT (OUTPATIENT)
Dept: CARDIAC REHAB | Facility: HOSPITAL | Age: 62
End: 2022-03-17

## 2022-03-17 ENCOUNTER — TREATMENT (OUTPATIENT)
Dept: CARDIAC REHAB | Facility: HOSPITAL | Age: 62
End: 2022-03-17

## 2022-03-17 DIAGNOSIS — Z00.00 PREVENTATIVE HEALTH CARE: Primary | ICD-10-CM

## 2022-03-22 ENCOUNTER — TREATMENT (OUTPATIENT)
Dept: CARDIAC REHAB | Facility: HOSPITAL | Age: 62
End: 2022-03-22

## 2022-03-22 ENCOUNTER — APPOINTMENT (OUTPATIENT)
Dept: CARDIAC REHAB | Facility: HOSPITAL | Age: 62
End: 2022-03-22

## 2022-03-22 DIAGNOSIS — Z00.00 PREVENTATIVE HEALTH CARE: Primary | ICD-10-CM

## 2022-03-24 ENCOUNTER — TREATMENT (OUTPATIENT)
Dept: CARDIAC REHAB | Facility: HOSPITAL | Age: 62
End: 2022-03-24

## 2022-03-24 ENCOUNTER — APPOINTMENT (OUTPATIENT)
Dept: CARDIAC REHAB | Facility: HOSPITAL | Age: 62
End: 2022-03-24

## 2022-03-24 DIAGNOSIS — Z00.00 PREVENTATIVE HEALTH CARE: Primary | ICD-10-CM

## 2022-03-29 ENCOUNTER — APPOINTMENT (OUTPATIENT)
Dept: CARDIAC REHAB | Facility: HOSPITAL | Age: 62
End: 2022-03-29

## 2022-03-31 ENCOUNTER — APPOINTMENT (OUTPATIENT)
Dept: CARDIAC REHAB | Facility: HOSPITAL | Age: 62
End: 2022-03-31

## 2022-04-05 ENCOUNTER — TREATMENT (OUTPATIENT)
Dept: CARDIAC REHAB | Facility: HOSPITAL | Age: 62
End: 2022-04-05

## 2022-04-05 ENCOUNTER — APPOINTMENT (OUTPATIENT)
Dept: CARDIAC REHAB | Facility: HOSPITAL | Age: 62
End: 2022-04-05

## 2022-04-05 DIAGNOSIS — Z00.00 PREVENTATIVE HEALTH CARE: Primary | ICD-10-CM

## 2022-04-07 ENCOUNTER — TREATMENT (OUTPATIENT)
Dept: CARDIAC REHAB | Facility: HOSPITAL | Age: 62
End: 2022-04-07

## 2022-04-07 ENCOUNTER — APPOINTMENT (OUTPATIENT)
Dept: CARDIAC REHAB | Facility: HOSPITAL | Age: 62
End: 2022-04-07

## 2022-04-07 DIAGNOSIS — Z00.00 PREVENTATIVE HEALTH CARE: Primary | ICD-10-CM

## 2022-04-12 ENCOUNTER — APPOINTMENT (OUTPATIENT)
Dept: CARDIAC REHAB | Facility: HOSPITAL | Age: 62
End: 2022-04-12

## 2022-04-12 ENCOUNTER — TREATMENT (OUTPATIENT)
Dept: CARDIAC REHAB | Facility: HOSPITAL | Age: 62
End: 2022-04-12

## 2022-04-12 DIAGNOSIS — Z00.00 PREVENTATIVE HEALTH CARE: Primary | ICD-10-CM

## 2022-04-14 ENCOUNTER — APPOINTMENT (OUTPATIENT)
Dept: CARDIAC REHAB | Facility: HOSPITAL | Age: 62
End: 2022-04-14

## 2022-04-14 ENCOUNTER — TREATMENT (OUTPATIENT)
Dept: CARDIAC REHAB | Facility: HOSPITAL | Age: 62
End: 2022-04-14

## 2022-04-14 DIAGNOSIS — Z00.00 PREVENTATIVE HEALTH CARE: Primary | ICD-10-CM

## 2022-04-19 ENCOUNTER — APPOINTMENT (OUTPATIENT)
Dept: CARDIAC REHAB | Facility: HOSPITAL | Age: 62
End: 2022-04-19

## 2022-04-19 ENCOUNTER — TREATMENT (OUTPATIENT)
Dept: CARDIAC REHAB | Facility: HOSPITAL | Age: 62
End: 2022-04-19

## 2022-04-19 DIAGNOSIS — Z00.00 PREVENTATIVE HEALTH CARE: Primary | ICD-10-CM

## 2022-04-21 ENCOUNTER — APPOINTMENT (OUTPATIENT)
Dept: CARDIAC REHAB | Facility: HOSPITAL | Age: 62
End: 2022-04-21

## 2022-04-25 NOTE — ADDENDUM NOTE
Addended by: CANELO LAWSON on: 12/10/2021 11:25 AM     Modules accepted: Orders    
Detail Level: Zone

## 2022-04-26 ENCOUNTER — APPOINTMENT (OUTPATIENT)
Dept: CARDIAC REHAB | Facility: HOSPITAL | Age: 62
End: 2022-04-26

## 2022-04-26 ENCOUNTER — TREATMENT (OUTPATIENT)
Dept: CARDIAC REHAB | Facility: HOSPITAL | Age: 62
End: 2022-04-26

## 2022-04-26 DIAGNOSIS — Z00.00 PREVENTATIVE HEALTH CARE: Primary | ICD-10-CM

## 2022-04-28 ENCOUNTER — APPOINTMENT (OUTPATIENT)
Dept: CARDIAC REHAB | Facility: HOSPITAL | Age: 62
End: 2022-04-28

## 2022-04-28 ENCOUNTER — TREATMENT (OUTPATIENT)
Dept: CARDIAC REHAB | Facility: HOSPITAL | Age: 62
End: 2022-04-28

## 2022-04-28 DIAGNOSIS — Z00.00 PREVENTATIVE HEALTH CARE: Primary | ICD-10-CM

## 2022-05-02 ENCOUNTER — TELEPHONE (OUTPATIENT)
Dept: CARDIOLOGY | Facility: CLINIC | Age: 62
End: 2022-05-02

## 2022-05-02 DIAGNOSIS — R53.82 CHRONIC FATIGUE: Primary | ICD-10-CM

## 2022-05-03 ENCOUNTER — LAB (OUTPATIENT)
Dept: LAB | Facility: HOSPITAL | Age: 62
End: 2022-05-03

## 2022-05-03 ENCOUNTER — TREATMENT (OUTPATIENT)
Dept: CARDIAC REHAB | Facility: HOSPITAL | Age: 62
End: 2022-05-03

## 2022-05-03 ENCOUNTER — APPOINTMENT (OUTPATIENT)
Dept: CARDIAC REHAB | Facility: HOSPITAL | Age: 62
End: 2022-05-03

## 2022-05-03 DIAGNOSIS — Z00.00 PREVENTATIVE HEALTH CARE: Primary | ICD-10-CM

## 2022-05-03 DIAGNOSIS — R53.82 CHRONIC FATIGUE: ICD-10-CM

## 2022-05-03 LAB
CK SERPL-CCNC: 137 U/L (ref 20–180)
ERYTHROCYTE [SEDIMENTATION RATE] IN BLOOD: 34 MM/HR (ref 0–30)

## 2022-05-03 PROCEDURE — 85652 RBC SED RATE AUTOMATED: CPT

## 2022-05-03 PROCEDURE — 36415 COLL VENOUS BLD VENIPUNCTURE: CPT

## 2022-05-03 PROCEDURE — 87040 BLOOD CULTURE FOR BACTERIA: CPT

## 2022-05-03 PROCEDURE — 82550 ASSAY OF CK (CPK): CPT

## 2022-05-05 ENCOUNTER — APPOINTMENT (OUTPATIENT)
Dept: CARDIAC REHAB | Facility: HOSPITAL | Age: 62
End: 2022-05-05

## 2022-05-08 LAB
BACTERIA SPEC AEROBE CULT: NORMAL
BACTERIA SPEC AEROBE CULT: NORMAL

## 2022-05-10 ENCOUNTER — TREATMENT (OUTPATIENT)
Dept: CARDIAC REHAB | Facility: HOSPITAL | Age: 62
End: 2022-05-10

## 2022-05-10 ENCOUNTER — APPOINTMENT (OUTPATIENT)
Dept: CARDIAC REHAB | Facility: HOSPITAL | Age: 62
End: 2022-05-10

## 2022-05-10 DIAGNOSIS — Z00.00 PREVENTATIVE HEALTH CARE: Primary | ICD-10-CM

## 2022-05-12 ENCOUNTER — TREATMENT (OUTPATIENT)
Dept: CARDIAC REHAB | Facility: HOSPITAL | Age: 62
End: 2022-05-12

## 2022-05-12 ENCOUNTER — APPOINTMENT (OUTPATIENT)
Dept: CARDIAC REHAB | Facility: HOSPITAL | Age: 62
End: 2022-05-12

## 2022-05-12 DIAGNOSIS — Z00.00 PREVENTATIVE HEALTH CARE: Primary | ICD-10-CM

## 2022-05-17 ENCOUNTER — APPOINTMENT (OUTPATIENT)
Dept: CARDIAC REHAB | Facility: HOSPITAL | Age: 62
End: 2022-05-17

## 2022-05-19 ENCOUNTER — APPOINTMENT (OUTPATIENT)
Dept: CARDIAC REHAB | Facility: HOSPITAL | Age: 62
End: 2022-05-19

## 2022-05-19 ENCOUNTER — TREATMENT (OUTPATIENT)
Dept: CARDIAC REHAB | Facility: HOSPITAL | Age: 62
End: 2022-05-19

## 2022-05-19 DIAGNOSIS — Z00.00 PREVENTATIVE HEALTH CARE: Primary | ICD-10-CM

## 2022-05-24 ENCOUNTER — APPOINTMENT (OUTPATIENT)
Dept: CARDIAC REHAB | Facility: HOSPITAL | Age: 62
End: 2022-05-24

## 2022-05-24 ENCOUNTER — TREATMENT (OUTPATIENT)
Dept: CARDIAC REHAB | Facility: HOSPITAL | Age: 62
End: 2022-05-24

## 2022-05-24 DIAGNOSIS — Z00.00 PREVENTATIVE HEALTH CARE: Primary | ICD-10-CM

## 2022-05-26 ENCOUNTER — APPOINTMENT (OUTPATIENT)
Dept: CARDIAC REHAB | Facility: HOSPITAL | Age: 62
End: 2022-05-26

## 2022-05-31 ENCOUNTER — APPOINTMENT (OUTPATIENT)
Dept: CARDIAC REHAB | Facility: HOSPITAL | Age: 62
End: 2022-05-31

## 2022-06-02 ENCOUNTER — TRANSCRIBE ORDERS (OUTPATIENT)
Dept: ADMINISTRATIVE | Facility: HOSPITAL | Age: 62
End: 2022-06-02

## 2022-06-02 ENCOUNTER — APPOINTMENT (OUTPATIENT)
Dept: CARDIAC REHAB | Facility: HOSPITAL | Age: 62
End: 2022-06-02

## 2022-06-02 ENCOUNTER — TREATMENT (OUTPATIENT)
Dept: CARDIAC REHAB | Facility: HOSPITAL | Age: 62
End: 2022-06-02

## 2022-06-02 DIAGNOSIS — E83.52 HYPERCALCEMIA: Primary | ICD-10-CM

## 2022-06-02 DIAGNOSIS — Z00.00 PREVENTATIVE HEALTH CARE: Primary | ICD-10-CM

## 2022-06-07 ENCOUNTER — APPOINTMENT (OUTPATIENT)
Dept: CARDIAC REHAB | Facility: HOSPITAL | Age: 62
End: 2022-06-07

## 2022-06-09 ENCOUNTER — APPOINTMENT (OUTPATIENT)
Dept: CARDIAC REHAB | Facility: HOSPITAL | Age: 62
End: 2022-06-09

## 2022-06-09 ENCOUNTER — TREATMENT (OUTPATIENT)
Dept: CARDIAC REHAB | Facility: HOSPITAL | Age: 62
End: 2022-06-09

## 2022-06-09 DIAGNOSIS — Z00.00 PREVENTATIVE HEALTH CARE: Primary | ICD-10-CM

## 2022-06-14 ENCOUNTER — TREATMENT (OUTPATIENT)
Dept: CARDIAC REHAB | Facility: HOSPITAL | Age: 62
End: 2022-06-14

## 2022-06-14 ENCOUNTER — APPOINTMENT (OUTPATIENT)
Dept: CARDIAC REHAB | Facility: HOSPITAL | Age: 62
End: 2022-06-14

## 2022-06-14 DIAGNOSIS — Z00.00 PREVENTATIVE HEALTH CARE: Primary | ICD-10-CM

## 2022-06-16 ENCOUNTER — APPOINTMENT (OUTPATIENT)
Dept: CARDIAC REHAB | Facility: HOSPITAL | Age: 62
End: 2022-06-16

## 2022-06-21 ENCOUNTER — APPOINTMENT (OUTPATIENT)
Dept: CARDIAC REHAB | Facility: HOSPITAL | Age: 62
End: 2022-06-21

## 2022-06-24 ENCOUNTER — LAB (OUTPATIENT)
Dept: LAB | Facility: HOSPITAL | Age: 62
End: 2022-06-24

## 2022-06-24 ENCOUNTER — OFFICE VISIT (OUTPATIENT)
Dept: ENDOCRINOLOGY | Facility: CLINIC | Age: 62
End: 2022-06-24

## 2022-06-24 VITALS
DIASTOLIC BLOOD PRESSURE: 82 MMHG | OXYGEN SATURATION: 99 % | HEIGHT: 64 IN | SYSTOLIC BLOOD PRESSURE: 140 MMHG | BODY MASS INDEX: 27.31 KG/M2 | WEIGHT: 160 LBS | HEART RATE: 74 BPM

## 2022-06-24 DIAGNOSIS — R79.89 LOW SERUM CORTISOL LEVEL: Primary | ICD-10-CM

## 2022-06-24 DIAGNOSIS — R53.82 CHRONIC FATIGUE: ICD-10-CM

## 2022-06-24 DIAGNOSIS — R79.89 LOW SERUM CORTISOL LEVEL: ICD-10-CM

## 2022-06-24 DIAGNOSIS — E83.52 HYPERCALCEMIA: ICD-10-CM

## 2022-06-24 LAB
T4 FREE SERPL-MCNC: 1.22 NG/DL (ref 0.93–1.7)
TSH SERPL DL<=0.05 MIU/L-ACNC: 0.51 UIU/ML (ref 0.27–4.2)

## 2022-06-24 PROCEDURE — 84439 ASSAY OF FREE THYROXINE: CPT

## 2022-06-24 PROCEDURE — 82533 TOTAL CORTISOL: CPT

## 2022-06-24 PROCEDURE — 99214 OFFICE O/P EST MOD 30 MIN: CPT | Performed by: PHYSICIAN ASSISTANT

## 2022-06-24 PROCEDURE — 84305 ASSAY OF SOMATOMEDIN: CPT

## 2022-06-24 PROCEDURE — 84443 ASSAY THYROID STIM HORMONE: CPT

## 2022-06-24 PROCEDURE — 84449 ASSAY OF TRANSCORTIN: CPT

## 2022-06-24 PROCEDURE — 36415 COLL VENOUS BLD VENIPUNCTURE: CPT

## 2022-06-24 RX ORDER — ESTRADIOL 0.07 MG/D
1 FILM, EXTENDED RELEASE TRANSDERMAL 2 TIMES WEEKLY
COMMUNITY
Start: 2022-04-05 | End: 2022-09-08

## 2022-06-27 ENCOUNTER — LAB (OUTPATIENT)
Dept: LAB | Facility: HOSPITAL | Age: 62
End: 2022-06-27

## 2022-06-27 ENCOUNTER — LAB (OUTPATIENT)
Dept: ENDOCRINOLOGY | Facility: CLINIC | Age: 62
End: 2022-06-27

## 2022-06-27 DIAGNOSIS — E83.52 HYPERCALCEMIA: ICD-10-CM

## 2022-06-27 LAB
CALCIUM 24H UR-MCNC: 17.6 MG/DL
CALCIUM 24H UR-MRATE: 220 MG/24 HR (ref 100–300)
COLLECT DURATION TIME UR: 24 HRS
IGF-I SERPL-MCNC: 120 NG/ML (ref 57–202)
SPECIMEN VOL 24H UR: 1250 ML

## 2022-06-27 PROCEDURE — 82340 ASSAY OF CALCIUM IN URINE: CPT | Performed by: PHYSICIAN ASSISTANT

## 2022-06-27 PROCEDURE — 81050 URINALYSIS VOLUME MEASURE: CPT | Performed by: PHYSICIAN ASSISTANT

## 2022-06-29 ENCOUNTER — LAB (OUTPATIENT)
Dept: LAB | Facility: HOSPITAL | Age: 62
End: 2022-06-29

## 2022-06-29 ENCOUNTER — LAB (OUTPATIENT)
Dept: ENDOCRINOLOGY | Facility: CLINIC | Age: 62
End: 2022-06-29

## 2022-06-29 DIAGNOSIS — E83.52 HYPERCALCEMIA: ICD-10-CM

## 2022-06-29 LAB
CA-I BLD-MCNC: 5.5 MG/DL (ref 4.6–5.4)
CA-I SERPL ISE-MCNC: 1.37 MMOL/L (ref 1.15–1.35)

## 2022-06-29 PROCEDURE — 82330 ASSAY OF CALCIUM: CPT | Performed by: PHYSICIAN ASSISTANT

## 2022-07-03 LAB
CORTIS F SERPL-MCNC: 0.12 UG/DL
CORTIS F/TOTAL MFR SERPL: 3.2 %
CORTIS SERPL-MCNC: 3.6 UG/DL
TRANSCORTIN SER-MCNC: 2.1 MG/DL

## 2022-07-13 ENCOUNTER — HOSPITAL ENCOUNTER (OUTPATIENT)
Dept: NUCLEAR MEDICINE | Facility: HOSPITAL | Age: 62
Discharge: HOME | End: 2022-07-13

## 2022-07-13 DIAGNOSIS — E83.52 HYPERCALCEMIA: ICD-10-CM

## 2022-07-13 PROCEDURE — 0 TECHNETIUM SESTAMIBI: Performed by: INTERNAL MEDICINE

## 2022-07-13 PROCEDURE — 78070 PARATHYROID PLANAR IMAGING: CPT

## 2022-07-13 PROCEDURE — A9500 TC99M SESTAMIBI: HCPCS | Performed by: INTERNAL MEDICINE

## 2022-07-13 RX ADMIN — TECHNETIUM TC 99M SESTAMIBI 1 DOSE: 1 INJECTION INTRAVENOUS at 09:29

## 2022-07-19 NOTE — TELEPHONE ENCOUNTER
[facility] Hospitalist Team  Progress Note    Shannan Parekh Patient Status:  Inpatient    1987 MRN [id number]   Location [facility] [address] Attending Andreea Koch MD   Hosp Day # 7 PCP Alonso Enamorado MD     CC: Follow Up  PCP: Alonso Enamorado MD    ASSESSMENT/PLAN:   Shannan Parekh is a 35 year old female who presented with syncope due to volume depletion. Pt then developed abd pain - found to have chronic pancreatitis w/ walled off necrosis     Abd Discomfort  Chronic Pancreatitis  Pancreatic Fluid Collections / Walled Off Necrosis  - CT abd done due to worsening abd discomfort, shows chronic pancreatitis with fluid collections at the head of the pancreas extending into the paripancreatic and mesenteric areas, c/w walled off necrosis.   - GI eval appreciated. D/w Dr. Vera yesterday -necrotic area is many small areas and not amendable to drainage  - pain control: tylenol/norco. Avoid IV narcotics if possible. Can consider celiac plexux block if needed for pain control  - tolerating clears so far. Advance diet and monitor intake   - nausea: zofran prn, compazine prn    Hypotension   - suspect she is intravascularly depleted due to third spacing w/ hypoalbuminemia  - giving albumin prn. Will give another dose today  - cont midodrine - increase to 10 mg TID dose  - already on antibiotics and I don't think she's septic, dia w/ normal wbc, no fever, no tachycardia  - discuss at length w/ pt need to increase protein intake  - Echo done as outpt w/ pEF  - no albuminuria  - will check urine protein/creatinine ratio  - check cortisol    Syncope  - suspect due to volume depletion with associated hypokalemia and hyponatremia  - orthostatics normal on admit  - reviewed recent outpatient Echocardiogram which was normal  - potassium and sodium normalized.      Anorexia nervosa with severe protein calorie malnutrition  - severe with restricting and purging behaviors  - contributing to above; psychiatry has  Dr Mukherjee,  Patient called and stated she was unable to get Esophageal Motility study. She stated she was so nervous and upset that she couldn't do it. Do you recommend anything else? Thanks    evaluated and recommending inpatient treatment on discharge.    - dietician to evaluate as well  - appreciate bariatric surgeon recommendations;  Started supplemental folate, B12, thiamine orally  - malnutrition / low albumin likely contributing to third spacing of fluid      Anasarca   - with LE edema, ascites, small pericardial and pleural effusions   - suspect third spacing due to hypoalbuminemia likely related to poor nutrition   - urine neg for protein   - had normal echo two weeks ago w/ normal EF  - LFTs slightly up but not alarming to suggest liver failure  - not able to give lasix w/ hypotension. Hopefully will self diurese once albumin improve     Ascites - we discuss that this is also likely part of third spacing issue. She's requesting paracentesis as she feels very uncomfortable. We discuss component of subcutaneous edema contributing. We discuss risks/benefit of paracentesis. Pt wants to try paracentesis hoping perhaps that might help with her sxs    COVID infection  --PCR positive on 7/11  --largely asymptomatic though possibly contributing to her feeling generally unwell  --not hypoxemic so no indication for COVID specific therapies  --will trend inflammatory markers  --some subjective dyspnea; D-dimer rising on labs--> CTA chest and LE dopplers done, neg for clot  ---> some findings that could suggest pulmonary HTN on imaging; note that she had relatively normal echocardiogram with no RV dysfunction as an outpatient about two weeks prior to admission.   --isolation needed for 10 days, can dc isolation 7/22     UTI with leukocytosis  --initially suspected leukocytosis was due to volume contraction but did not improve with hydration  --patient reporting hematuria but no dysuria, urgency, frequency  - UCx with E Coli, initially tx with rocephin empirically; now culture back with resistance to multiple agents including rocephin; changed to meropenem - plan total 7 days of effective abx  --monitor.  Repeat UA was done 7/17 and appeared improved.      Macrocytic anemia  --suspect nutritional; B12 borderline low and folate low  --start B12, folate supplementation  --ferritin slightly elevated, TIBC low, iron low consistent with anemia of chronic disease  --no clinical signs or symptoms of active bleeding  --Hgb stable in the 7's  - received transfusion 7/15 and Hgb trended from 7.8 --> 7.3 despite transfusion, no sign of bleeding though; may have been dilutional as pt also received albumin infusions; monitor.   - hgb stable      Transaminitis/hyperbilirubinemia  Hepatic Steatosis   --with associated nausea/vomiting and abdominal pain, liver US shows hepatic steatosis, no biliary ductal dilatation  --patient has history of alcoholic pancreatitis; denies recent EtOH (last about a month pta)   --repeat CMP. Improving.   --lipase was normal on admission and on repeat.      Hyponatremia  - due to volume depletion  - monitor. Improved.      Major depression, in partial remission/Anxiety  --stable, continue current psychotropic medications  --appreciate psychiatry consult. Rec inpatient behavioral health on voluntary basis.      hypothryoidism  - noted elevated TSH w/ low T3  - start synthroid     VTE ppx: Lovenox     Dispo: Admit inpatient. ADOD: likely 2-3 more days pending addressing pancreatitis issue    Discuss w/ pt at bedside  Discuss w/ her aunt per pt's request and permission.    SUBJECTIVE:     States still having abd pain  States have a hard time even moving around in bed due to abd discomfort    Ros: as above      OBJECTIVE:   Blood pressure (!) 88/58, pulse 65, temperature 98.4 °F (36.9 °C), temperature source Oral, resp. rate 18, height 5' 2\" (1.575 m), weight 86 kg (189 lb 9.5 oz), SpO2 99 %.    GENERAL: appears comfortable  NEURO: A/A Ox3  RESP: non labored, CTA  CARDIO: Regular, no murmur  ABD: soft, TTP - more diffusely though worse in upper abdomen.  EXTREMITIES: noted 1+ edema  PSYCH: appropriate  mood and affect    DIAGNOSTIC DATA:     Recent Results (from the past 24 hour(s))   Hemoglobin    Collection Time: 07/18/22  6:09 PM   Result Value Ref Range    HGB 9.5 (L) 12.0 - 15.5 g/dL   Hemoglobin    Collection Time: 07/19/22  6:36 AM   Result Value Ref Range    HGB 8.5 (L) 12.0 - 15.5 g/dL   CBC with Automated Differential (performable only)    Collection Time: 07/19/22  6:36 AM   Result Value Ref Range    WBC 8.1 4.2 - 11.0 K/mcL    RBC 2.53 (L) 4.00 - 5.20 mil/mcL    HGB 8.5 (L) 12.0 - 15.5 g/dL    HCT 26.8 (L) 36.0 - 46.5 %    .0 (H) 78.0 - 100.0 fl    MCH 33.6 26.0 - 34.0 pg    MCHC 32.1 32.0 - 36.5 g/dL    RDW-CV 20.7 (H) 11.0 - 15.0 %    RDW-SD 80.4 (H) 39.0 - 50.0 fL     140 - 450 K/mcL    NRBC 0 <=0 /100 WBC    Neutrophil, Percent 71 %    Lymphocytes, Percent 18 %    Mono, Percent 8 %    Eosinophils, Percent 2 %    Basophils, Percent 1 %    Immature Granulocytes 0 %    Absolute Neutrophils 5.8 1.8 - 7.7 K/mcL    Absolute Lymphocytes 1.5 1.0 - 4.8 K/mcL    Absolute Monocytes 0.6 0.3 - 0.9 K/mcL    Absolute Eosinophils  0.1 0.0 - 0.5 K/mcL    Absolute Basophils 0.1 0.0 - 0.3 K/mcL    Absolute Immmature Granulocytes 0.0 0.0 - 0.2 K/mcL   Comprehensive Metabolic Panel    Collection Time: 07/19/22  9:27 AM   Result Value Ref Range    Fasting Status      Sodium 140 135 - 145 mmol/L    Potassium 4.0 3.4 - 5.1 mmol/L    Chloride 112 (H) 97 - 110 mmol/L    Carbon Dioxide 22 21 - 32 mmol/L    Anion Gap 10 7 - 19 mmol/L    Glucose 78 70 - 99 mg/dL    BUN 10 6 - 20 mg/dL    Creatinine 0.40 (L) 0.51 - 0.95 mg/dL    Glomerular Filtration Rate >90 >=60    BUN/ Creatinine Ratio 25 7 - 25    Calcium 7.7 (L) 8.4 - 10.2 mg/dL    Bilirubin, Total 0.9 0.2 - 1.0 mg/dL    GOT/AST 63 (H) <=37 Units/L    GPT/ALT 9 <64 Units/L    Alkaline Phosphatase 134 (H) 45 - 117 Units/L    Albumin 2.3 (L) 3.6 - 5.1 g/dL    Protein, Total 5.0 (L) 6.4 - 8.2 g/dL    Globulin 2.7 2.0 - 4.0 g/dL    A/G Ratio 0.9 (L) 1.0 - 2.4    Phosphorus    Collection Time: 07/19/22  9:27 AM   Result Value Ref Range    Phosphorus 3.0 2.4 - 4.7 mg/dL   Protein, Total    Collection Time: 07/19/22 12:46 PM   Result Value Ref Range    Protein, Total 5.2 (L) 6.4 - 8.2 g/dL           MEDICATIONS     Current Facility-Administered Medications   Medication   • albumin human (SPA) 25 % injection 25 g   • midodrine (PROAMATINE) tablet 10 mg   • midodrine (PROAMATINE) tablet 5 mg   • prochlorperazine (COMPAZINE) injection 10 mg   • acetaminophen (TYLENOL) tablet 650 mg   • fluconazole (DIFLUCAN) tablet 150 mg   • lactated ringers infusion   • sodium chloride (PF) 0.9 % injection 10 mL   • sodium chloride (PF) 0.9 % injection 10 mL   • meropenem (MERREM) 500 mg in sodium chloride 0.9 % 100 mL IVPB   • sodium chloride 0.9% infusion   • folic acid (FOLATE) tablet 1 mg   • cyanocobalamin (Vitamin B-12) tablet 250 mcg   • thiamine (VITAMIN B1) tablet 100 mg   • sodium chloride 0.9 % flush bag 25 mL   • sodium chloride (PF) 0.9 % injection 2 mL   • sodium chloride (NORMAL SALINE) 0.9 % bolus 500 mL   • sodium chloride 0.9 % flush bag 25 mL   • enoxaparin (LOVENOX) injection 40 mg   • Potassium Standard Replacement Protocol   • Magnesium Standard Replacement Protocol   • polyethylene glycol (MIRALAX) packet 17 g   • ondansetron (ZOFRAN) injection 4 mg   • albuterol inhaler 4 puff   • ALPRAZolam (XANAX) tablet 0.5 mg   • escitalopram (LEXAPRO) tablet 10 mg   • gabapentin (NEURONTIN) capsule 600 mg   • hyoscyamine (HYOSYNE) 0.125 MG/5ML solution 0.125 mg   • lipase-protease-amylase 24,000-76,000-120,000 units (CREON) per capsule 2 capsule   • ondansetron (ZOFRAN) tablet 8 mg   • traZODone (DESYREL) tablet 100 mg   • HYDROcodone-acetaminophen (NORCO) 5-325 MG per tablet 1 tablet           IMAGING     No results found for any visits on 07/11/22 (from the past 48 hour(s)).

## 2022-09-07 ENCOUNTER — TRANSCRIBE ORDERS (OUTPATIENT)
Dept: ADMINISTRATIVE | Facility: HOSPITAL | Age: 62
End: 2022-09-07

## 2022-09-07 DIAGNOSIS — Z12.31 ENCOUNTER FOR SCREENING MAMMOGRAM FOR BREAST CANCER: Primary | ICD-10-CM

## 2022-09-08 ENCOUNTER — OFFICE VISIT (OUTPATIENT)
Dept: CARDIOLOGY | Facility: CLINIC | Age: 62
End: 2022-09-08

## 2022-09-08 VITALS
HEART RATE: 79 BPM | DIASTOLIC BLOOD PRESSURE: 78 MMHG | WEIGHT: 157 LBS | OXYGEN SATURATION: 98 % | SYSTOLIC BLOOD PRESSURE: 166 MMHG | BODY MASS INDEX: 26.8 KG/M2 | HEIGHT: 64 IN

## 2022-09-08 DIAGNOSIS — R07.9 CHEST PAIN WITH NORMAL ANGIOGRAPHY: Primary | ICD-10-CM

## 2022-09-08 DIAGNOSIS — I10 ESSENTIAL HYPERTENSION: ICD-10-CM

## 2022-09-08 DIAGNOSIS — R00.2 PALPITATIONS: ICD-10-CM

## 2022-09-08 DIAGNOSIS — R01.1 HEART MURMUR, SYSTOLIC: ICD-10-CM

## 2022-09-08 PROCEDURE — 99214 OFFICE O/P EST MOD 30 MIN: CPT | Performed by: NURSE PRACTITIONER

## 2022-09-08 RX ORDER — OLMESARTAN MEDOXOMIL 40 MG/1
40 TABLET ORAL DAILY
Qty: 30 TABLET | Refills: 5 | Status: SHIPPED | OUTPATIENT
Start: 2022-09-08 | End: 2023-03-13

## 2022-10-04 ENCOUNTER — HOSPITAL ENCOUNTER (OUTPATIENT)
Dept: CARDIOLOGY | Facility: HOSPITAL | Age: 62
Discharge: HOME | End: 2022-10-04
Admitting: NURSE PRACTITIONER

## 2022-10-04 VITALS — BODY MASS INDEX: 26.8 KG/M2 | WEIGHT: 156.97 LBS | HEIGHT: 64 IN

## 2022-10-04 PROCEDURE — 93306 TTE W/DOPPLER COMPLETE: CPT

## 2022-10-04 PROCEDURE — 93306 TTE W/DOPPLER COMPLETE: CPT | Performed by: INTERNAL MEDICINE

## 2022-10-05 LAB
BH CV ECHO MEAS - AO MAX PG: 19.2 MMHG
BH CV ECHO MEAS - AO MEAN PG: 10 MMHG
BH CV ECHO MEAS - AO ROOT DIAM: 2.7 CM
BH CV ECHO MEAS - AO V2 MAX: 219.2 CM/SEC
BH CV ECHO MEAS - AO V2 VTI: 48.2 CM
BH CV ECHO MEAS - AVA(I,D): 1.56 CM2
BH CV ECHO MEAS - EDV(CUBED): 91.9 ML
BH CV ECHO MEAS - EDV(MOD-SP2): 72.1 ML
BH CV ECHO MEAS - EDV(MOD-SP4): 69.6 ML
BH CV ECHO MEAS - EF(MOD-BP): 58.5 %
BH CV ECHO MEAS - EF(MOD-SP2): 57.6 %
BH CV ECHO MEAS - EF(MOD-SP4): 58.2 %
BH CV ECHO MEAS - ESV(CUBED): 30 ML
BH CV ECHO MEAS - ESV(MOD-SP2): 30.6 ML
BH CV ECHO MEAS - ESV(MOD-SP4): 29.1 ML
BH CV ECHO MEAS - FS: 31.2 %
BH CV ECHO MEAS - IVS/LVPW: 0.89 CM
BH CV ECHO MEAS - IVSD: 0.88 CM
BH CV ECHO MEAS - LA DIMENSION: 3.9 CM
BH CV ECHO MEAS - LAT PEAK E' VEL: 9.7 CM/SEC
BH CV ECHO MEAS - LV DIASTOLIC VOL/BSA (35-75): 39.4 CM2
BH CV ECHO MEAS - LV MASS(C)D: 139.3 GRAMS
BH CV ECHO MEAS - LV MAX PG: 5.6 MMHG
BH CV ECHO MEAS - LV MEAN PG: 3.3 MMHG
BH CV ECHO MEAS - LV SYSTOLIC VOL/BSA (12-30): 16.5 CM2
BH CV ECHO MEAS - LV V1 MAX: 118.7 CM/SEC
BH CV ECHO MEAS - LV V1 VTI: 25.8 CM
BH CV ECHO MEAS - LVIDD: 4.5 CM
BH CV ECHO MEAS - LVIDS: 3.1 CM
BH CV ECHO MEAS - LVOT AREA: 2.9 CM2
BH CV ECHO MEAS - LVOT DIAM: 1.93 CM
BH CV ECHO MEAS - LVPWD: 0.98 CM
BH CV ECHO MEAS - MED PEAK E' VEL: 6.2 CM/SEC
BH CV ECHO MEAS - MV A MAX VEL: 82.3 CM/SEC
BH CV ECHO MEAS - MV DEC SLOPE: 315 CM/SEC2
BH CV ECHO MEAS - MV DEC TIME: 0.24 MSEC
BH CV ECHO MEAS - MV E MAX VEL: 92.1 CM/SEC
BH CV ECHO MEAS - MV E/A: 1.12
BH CV ECHO MEAS - MV MAX PG: 4 MMHG
BH CV ECHO MEAS - MV MEAN PG: 2.3 MMHG
BH CV ECHO MEAS - MV P1/2T: 89.6 MSEC
BH CV ECHO MEAS - MV V2 VTI: 31.2 CM
BH CV ECHO MEAS - MVA(P1/2T): 2.45 CM2
BH CV ECHO MEAS - MVA(VTI): 2.41 CM2
BH CV ECHO MEAS - PA ACC TIME: 0.16 SEC
BH CV ECHO MEAS - PA PR(ACCEL): 6.1 MMHG
BH CV ECHO MEAS - RAP SYSTOLE: 3 MMHG
BH CV ECHO MEAS - RVSP: 26 MMHG
BH CV ECHO MEAS - SI(MOD-SP2): 23.5 ML/M2
BH CV ECHO MEAS - SI(MOD-SP4): 22.9 ML/M2
BH CV ECHO MEAS - SV(LVOT): 75.3 ML
BH CV ECHO MEAS - SV(MOD-SP2): 41.5 ML
BH CV ECHO MEAS - SV(MOD-SP4): 40.5 ML
BH CV ECHO MEAS - TAPSE (>1.6): 2.8 CM
BH CV ECHO MEAS - TR MAX PG: 22.6 MMHG
BH CV ECHO MEAS - TR MAX VEL: 237.5 CM/SEC
BH CV ECHO MEASUREMENTS AVERAGE E/E' RATIO: 11.58
BH CV VAS BP RIGHT ARM: NORMAL MMHG
BH CV XLRA - RV BASE: 3.4 CM
BH CV XLRA - RV LENGTH: 6.6 CM
BH CV XLRA - RV MID: 2.19 CM
BH CV XLRA - TDI S': 12 CM/SEC
LEFT ATRIUM VOLUME INDEX: 27 ML/M2
LV EF 2D ECHO EST: 60 %
MAXIMAL PREDICTED HEART RATE: 158 BPM
STRESS TARGET HR: 134 BPM

## 2022-10-11 ENCOUNTER — HOSPITAL ENCOUNTER (OUTPATIENT)
Dept: MAMMOGRAPHY | Facility: HOSPITAL | Age: 62
Discharge: HOME | End: 2022-10-11
Admitting: OBSTETRICS & GYNECOLOGY

## 2022-10-11 DIAGNOSIS — Z12.31 ENCOUNTER FOR SCREENING MAMMOGRAM FOR BREAST CANCER: ICD-10-CM

## 2022-10-11 PROCEDURE — 77063 BREAST TOMOSYNTHESIS BI: CPT | Performed by: RADIOLOGY

## 2022-10-11 PROCEDURE — 77063 BREAST TOMOSYNTHESIS BI: CPT

## 2022-10-11 PROCEDURE — 77067 SCR MAMMO BI INCL CAD: CPT

## 2022-10-11 PROCEDURE — 77067 SCR MAMMO BI INCL CAD: CPT | Performed by: RADIOLOGY

## 2023-03-13 RX ORDER — OLMESARTAN MEDOXOMIL 40 MG/1
TABLET ORAL
Qty: 90 TABLET | Refills: 1 | Status: SHIPPED | OUTPATIENT
Start: 2023-03-13

## 2023-03-16 NOTE — PROGRESS NOTES
Subjective:     Encounter Date:03/29/2023      Patient ID: Nancy Albert is a 62 y.o. single -American female from [place], retired from the [employer].     PHYSICIAN: Franklin Will MD  SLEEP PHYSICIAN: [facility]  REFERRING HEALTHCARE PROVIDER: ANA Theodore  GI PHYSICIAN:  Nicola Mukherjee MD .  HEMATOLOGIST: Bhupendra De La Cruz MD  INTERVENTIONAL CARDIOLOGIST: Checo Groves IV, MD, [facility]  NEPHROLOGIST: Reji Castillo MD/ [facility]  RHEUMATOLOGIST: Heavenly Carroll MD .    Chief Complaint:   Chief Complaint   Patient presents with   • Follow-up     Chest pain with normal angiography     Problem List:  1. Hypertensive cardiovascular disease:  a. Graded exercise test, 2/11/2016: The maximal exercise stress test negative by ST criteria.  No precordial  symptoms developed.  Normal systolic blood pressure response.  Normal exercise tolerance.  No ventricular ectopy recorded.  Clinical correlation required.  b. Remote 24-hour Holter monitor approximately 2016 with her physician: Normal-data deficit   c. Stress echocardiogram, 10/12/2018: Normal stress echo with no significant echocardiographic evidence for myocardial ischemia, EF 0.56-0.60  d. Holter monitor, 9/6/2018: Abnormal monitor study with one episode of nonsustained V. tach, 6 beats in duration.  Occasional PVCs.  Rare, brief episodes of PAT.  Patient triggered event related was sinus rhythm  e. 24-hour ambulatory blood pressure monitor November 2018: Average blood pressure 100-120/60-70 torr, heart rate 60-70 bpm, maximum blood pressure 160/85  f. Residual class I symptoms with intermittent random atypical chest pain, March 2019, September 2020   g. Stress echo, 10/5/2020: Acceptable negative echocardiographic exercise stress test  h. LHC, 11/23/2020: Essentially normal coronary arteries with normal LV filling pressure.  i. CCS class I atypical chest  pain syndrome with NYHA class II exertional dyspnea and fatigue, February 2021, August 2021, March 2022, September 2022  j. Echocardiogram 10/5/2022: LVEF 60%, LV diastolic function consistent with grade 1 impaired relaxation, no significant valvular abnormalities, RVSP less than 35 mmHg  2. Hypertension - probably essential with multiple drug intolerances.  3. Apparent asymptomatic nonsustained ventricular tachycardia during hypokalemia, incidentally found on Holter monitor, September 2018, [employer] 5/12/2020-5/26/2020: Heart rate ranged between  bpm with average 71 bpm, rare atrial and ventricular ectopy, first-degree AV block intermittently, and 1 run of SVT lasting 9 beats at a maximum of 152 bpm but was asymptomatic.  No evidence of atrial fibrillation/flutter, V. tach, pauses, or ischemic ST-T changes.  4. Palpitations with Holter Monitor (9/6/2018): Abnormal monitor study with one episode of nonsustained V. tach, 6 beats in duration.  Occasional PVCs.  Rare, brief episodes of PAT, with recent increased severity of palpitation and event monitor completed - data deficit, April 2020   5. Cardiac murmur with acceptable echocardiogram, October 2018  6. At risk for sleep apnea with negative sleep study in 2017-data deficit, acceptable sleep study August 2021  7. Acid reflux, diagnosed August 2019, with repeat ED visits winter 2020 with abdominal pain and continued GI evaluation and treatment, April 2020  8. Leukopenia August 2020  9. Hypercalcemia with nuclear medicine parathyroid scan negative for parathyroid adenoma July 2022  10. Surgical history: KATI, left oophorectomy    Allergies   Allergen Reactions   • Spironolactone Other (See Comments)     Chest pain, parasthesias   • Beta Adrenergic Blockers GI Intolerance   • Verapamil Hcl Er GI Intolerance       Current Outpatient Medications   Medication Instructions   • amLODIPine (NORVASC) 5 mg, Oral, Daily   • cholecalciferol (VITAMIN D3) 1,000 Units, Oral,  Daily   • cyanocobalamin (CYANOCOBALAMIN) 100 mcg, Oral, Daily   • Diclofenac Sodium (VOLTAREN) 4 g, Topical, 2 Times Daily   • nitroglycerin (NITROSTAT) 0.4 MG SL tablet 1 under the tongue as needed for angina, may repeat q5mins for up three doses   • olmesartan (BENICAR) 40 MG tablet TAKE ONE TABLET BY MOUTH DAILY   • Probiotic Product (PROBIOTIC-10 PO) 1 tablet, Oral, Daily         HISTORY OF PRESENT ILLNESS:  The patient is here for 6-month follow-up. The patient had a heart catheterization November 2020 which showed normal coronaries.  She will follow-up with her endocrinologist regarding her hypercalcemia.  At her last appointment her losartan was discontinued and she was started on olmesartan 40 mg daily. She had an echocardiogram 10/5/2022 which showed LVEF 60%, LV diastolic function consistent with grade 1 impaired relaxation, no significant valvular abnormalities, RVSP less than 35 mmHg.  The patient says that she has fatigue and was found to be low in vitamin D and vitamin B12.  She takes vitamin D twice a week because her nephrologist told her it was not good for her to take vitamin D with her hypercalcemia.  She is taking vitamin B12 every day.  She had a negative sleep study remotely.  She is considering going back to cardiac rehab because she felt like it helped her.  She is also considering going to the senior center to do some exercises there as well.  She is going to try to limit her salt and make dietary modifications to lose some weight.  Whenever she checks her blood pressure at home it is normally around 140-145 systolic.  She would prefer to adjust her diet and increase activity before starting on an additional antihypertensive medication.  She has been able to tolerate the 5 mg dose of amlodipine.  She has not had any recent labs.  She was seen by rheumatology last fall and apparently was ruled out for autoimmune diseases-data deficit.  She denies any chest pain, increased shortness of  "breath, edema, palpitations, presyncope, or syncope.  Her main complaint is just of fatigue.  She says her cortisol level has chronically been low and she is followed by endocrinology.      ROS   All other systems reviewed and otherwise negative.      ECG 12 Lead    Date/Time: 3/29/2023 4:10 PM  Performed by: Jennifer Ko APRN  Authorized by: Jennifer Ko APRN   Rhythm comments: Normal sinus rhythm, right atrial enlargement, minimal voltage criteria for LVH, may be normal variant, nonspecific ST and T wave abnormality, abnormal ECG, 70 bpm,  ms, QRS 86 ms, QTc 406 ms, T wave inversions no longer present compared to ECG in [place]                 Objective:       Vitals:    03/29/23 1342 03/29/23 1357 03/29/23 1604 03/29/23 1605   BP:   136/76 140/76   BP Location:   Right arm Left arm   Patient Position:   Sitting Sitting   Pulse: 73 84     Resp: 18      SpO2: 98%      Weight: 73.9 kg (163 lb)      Height: 162.6 cm (64.02\")        Body mass index is 27.96 kg/m².  Last weight September 2022 was 157 pounds  Constitutional:       Appearance: Healthy appearance. Not in distress.   Neck:      Vascular: No JVR. JVD normal.   Pulmonary:      Effort: Pulmonary effort is normal.      Breath sounds: Normal breath sounds. No wheezing. No rhonchi. No rales.   Chest:      Chest wall: Not tender to palpatation.   Cardiovascular:      PMI at left midclavicular line. Normal rate. Regular rhythm. Normal S1. Normal S2.      Murmurs: There is a grade 2/6 systolic murmur at the URSB, LLSB and ULSB.      No gallop. No click. No rub.   Pulses:     Dorsalis pedis: 1+ bilaterally.     Posterior tibial: 1+ bilaterally.  Edema:     Peripheral edema absent.   Abdominal:      General: Bowel sounds are normal.      Palpations: Abdomen is soft.      Tenderness: There is no abdominal tenderness.   Musculoskeletal: Normal range of motion.         General: No tenderness. Skin:     General: Skin is warm and dry.   Neurological:      " General: No focal deficit present.      Mental Status: Alert and oriented to person, place and time.           Lab Review:   Lab Results   Component Value Date    GLUCOSE 100 (H) 11/23/2020    BUN 16 11/23/2020    CREATININE 0.71 11/23/2020    EGFRIFAFRI 101 11/23/2020    BCR 22.5 11/23/2020    CO2 25.0 11/23/2020    CALCIUM 9.9 11/23/2020    PROTENTOTREF 7.5 09/28/2020    ALBUMIN 4.60 11/23/2020    LABIL2 1.1 09/28/2020    AST 16 11/23/2020    ALT 9 11/23/2020       Lab Results   Component Value Date    WBC 5.24 11/23/2020    HGB 14.1 11/23/2020    HCT 44.2 11/23/2020    MCV 88.6 11/23/2020     11/23/2020       Lab Results   Component Value Date    HGBA1C 5.20 11/23/2020       Lab Results   Component Value Date    TSH 0.514 06/24/2022       Lab Results   Component Value Date    CHOL 187 11/23/2020     Lab Results   Component Value Date    TRIG 68 11/23/2020     Lab Results   Component Value Date    HDL 70 (H) 11/23/2020     Lab Results   Component Value Date     (H) 11/23/2020 2/23/2022:  • Cortisol 4.9  • Urinalysis unremarkable except trace occult blood, WBC WNL, RBC 3-10, no casts and few bacteria  • Vitamin D-21.1  • PTH 54  • CBC, WBC 5.1, RBC 4.59, hemoglobin 12.9, hematocrit 39, MCV 85, MCH 28.1, MCHC 33.1, RDW 12.4, platelets 213, neutrophils 62,/32, monocytes 5, eos 1, basos 0  • Renal panel: Glucose 94, BUN 14, creatinine 0.74, , sodium 137, potassium 4.4, chloride 101, carbon dioxide 31, calcium 9.9, phosphorus 3.2, albumin 4.6     9/02/2022:  • Renal panel: Glucose 90, BUN 13, creatinine 0.77, GFR 87, sodium 139, potassium 4.3, chloride 102, carbon dioxide 30, calcium 10.3, phosphorus 3.5, albumin 4.8  • PTH 52  Vitamin D-32.5    Advance Care Planning   ACP discussion was held with the patient during this visit. Patient does not have an advance directive, declines further assistance.         Assessment:     Overall continued acceptable course with no new interim cardiopulmonary  complaints with fair functional status. We will defer additional diagnostic or therapeutic intervention from a cardiac perspective at this time. Patient not a good candidate for hydrochlorothiazide in view of history of hypercalcemia.She had an echocardiogram 10/5/2022 showed LVEF 60%, LV diastolic function consistent with grade 1 impaired relaxation, no significant valvular abnormalities, RVSP less than 35 mmHg.The patient had a heart catheterization November 2020 which showed normal coronaries.  Patient prefers to make some dietary modifications and increase her physical activity before adding an additional antihypertensive medication.  She is also going to try to restart cardiac rehab or go to the [facility] for activity.     Diagnosis Plan   1. Palpitations   Stable, continue current cardiac medications      2. Essential hypertension  Patient prefers to make some dietary modifications and increase her physical activity before adding an additional antihypertensive medication.  She is also going to try to restart cardiac rehab or go to the [facility] for activity.        3. Chest pain, unspecified type  No recurrent angina pectoris or CHF on current activity schedule; continue current treatment             Plan:         1. Patient to continue current medications and close follow up with the above providers.  2. Tentative cardiology follow up in September 2023 or patient may return sooner PRN.   3. Patient prefers to make some dietary modifications and increase her physical activity before adding an additional antihypertensive medication.  She is also going to try to restart cardiac rehab or go to the [facility] for activity.    Electronically signed by ANA Son, 03/29/23, 4:12 PM EDT.

## 2023-03-29 ENCOUNTER — OFFICE VISIT (OUTPATIENT)
Dept: CARDIOLOGY | Facility: CLINIC | Age: 63
End: 2023-03-29
Payer: COMMERCIAL

## 2023-03-29 VITALS
DIASTOLIC BLOOD PRESSURE: 76 MMHG | HEIGHT: 64 IN | WEIGHT: 163 LBS | SYSTOLIC BLOOD PRESSURE: 140 MMHG | OXYGEN SATURATION: 98 % | HEART RATE: 84 BPM | RESPIRATION RATE: 18 BRPM | BODY MASS INDEX: 27.83 KG/M2

## 2023-03-29 DIAGNOSIS — R07.9 CHEST PAIN, UNSPECIFIED TYPE: ICD-10-CM

## 2023-03-29 DIAGNOSIS — R00.2 PALPITATIONS: Primary | ICD-10-CM

## 2023-03-29 DIAGNOSIS — I10 ESSENTIAL HYPERTENSION: ICD-10-CM

## 2023-03-29 PROCEDURE — 93000 ELECTROCARDIOGRAM COMPLETE: CPT | Performed by: NURSE PRACTITIONER

## 2023-03-29 PROCEDURE — 99214 OFFICE O/P EST MOD 30 MIN: CPT | Performed by: NURSE PRACTITIONER

## 2023-03-29 RX ORDER — PYRIDOXINE HCL (VITAMIN B6) 50 MG
100 TABLET ORAL DAILY
COMMUNITY

## 2023-03-29 RX ORDER — MELATONIN
1000 DAILY
COMMUNITY

## 2023-03-31 NOTE — TELEPHONE ENCOUNTER
----- Message from Nancy Albert sent at 5/2/2022 12:53 PM EDT -----  Regarding: Chest Discomfort/Fatigue  On last appt. 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 When I had SIBO I dealt with extreme fatigue, weight loss and constant heart palpitations. Could it be the infection from SIBO went into my bloodstream and affected my heart/or heart valve (endocarditis, myocarditis, pericarditis; I looked these up on [employer])? Also had several dental procedures prior to my SIBO diagnosis. Still have daily chest discomfort, dizziness, CF, tingling and numbness in my left leg, foot & arm & hand. Pain in my upper shoulders, upper arms & neck. Recently taking Estradiol 0.075 mg to help with fatigue but not helping much. Have been seen by rheumatologist, endocrinologist, pulmonologist, gastroenterologist, nephrologist & hematologist doctors and they find nothing wrong. I do not want to have a heart attack; I rather be safe than sorry. What other testing or   referrals can be done. I still believe the BP meds may be the culprit. I am in misery and have been so for 3 years; would appreciate your medical assistance and/or suggestions. Please call me on my cell at [phone].  ________________________________________    Please advise.  
Called pt, no answer, LVM for return call.     
Noted; I am sorry she has seen 6 different specialists and no diagnosis established.  Would consider blood cultures x2, ESR, and CPK values.  She has history of normal coronary arteries and we can repeat an echocardiogram if this has not been done in the past 2 to 3 years.  She may need to consider consultation with [facility].    Thanks!  
Pt called back, gave KTS recommendations above. Pt verbalizes understanding and agreeable to plan.    
Adequate: hears normal conversation without difficulty

## 2023-09-19 ENCOUNTER — OFFICE VISIT (OUTPATIENT)
Dept: ENDOCRINOLOGY | Facility: CLINIC | Age: 63
End: 2023-09-19
Payer: COMMERCIAL

## 2023-09-19 VITALS
HEART RATE: 71 BPM | HEIGHT: 64 IN | BODY MASS INDEX: 29.09 KG/M2 | SYSTOLIC BLOOD PRESSURE: 128 MMHG | OXYGEN SATURATION: 98 % | WEIGHT: 170.4 LBS | DIASTOLIC BLOOD PRESSURE: 76 MMHG

## 2023-09-19 DIAGNOSIS — R79.89 LOW SERUM CORTISOL LEVEL: Primary | ICD-10-CM

## 2023-09-19 PROCEDURE — 99213 OFFICE O/P EST LOW 20 MIN: CPT | Performed by: INTERNAL MEDICINE

## 2023-09-19 NOTE — ASSESSMENT & PLAN NOTE
She had low baseline cortisol but normal cortrosyn stimulation testing.  Other endocrine workup has been normal.  She doesn't appear to have adrenal insufficiency but I would like to check AM cortisol and ACTH levels again.  Could she have low ACTH causing the low baseline cortisol?  Will contact her with results when available.

## 2023-09-19 NOTE — PROGRESS NOTES
"     Office Note      Date: 2023  Patient Name: Nancy Albert  MRN: [id number]  : 1960    Chief Complaint   Patient presents with    low serum cortisol level       History of Present Illness:   Nancy Albert is a 62 y.o. female who presents for low serum cortisol level    She has h/o mildly low random serum cortisol.  She has had cortrosyn stimulation test done that was normal in 2021.  She has had normal TFTs.  She has h/o mild hypercalcemia that has been followed by nephrology.  She c/o fatigue.  She reports she isn't sleeping well.  She says she wakes up tired.  She feels some better in the evening.  She c/o back and muscle aches.  She has seen rheumatologist.  Fibromyalgia w/u has been negative.  She c/o weight gain.  She has had extensive workup with multiple specialists.  She denies any recent steroid use.  She had abd CT done 3/2021 that showed normal adrenal glands.  She continues to follow up with nephrology annually.  She has appt coming up soon with them.  She is on vit D supplement 1000 IU daily.  She is taking vit D for 3 months.  She is also taking OTC B12 supplement.    Subjective      Review of Systems:   Review of Systems   Constitutional:  Positive for fatigue.   Cardiovascular: Negative.    Gastrointestinal: Negative.    Endocrine: Negative.      The following portions of the patient's history were reviewed and updated as appropriate: allergies, current medications, past family history, past medical history, past social history, past surgical history, and problem list.    Objective     Visit Vitals  /76 (BP Location: Right arm, Patient Position: Sitting, Cuff Size: Adult)   Pulse 71   Ht 162.6 cm (64\")   Wt 77.3 kg (170 lb 6.4 oz)   SpO2 98%   BMI 29.25 kg/m²       Physical Exam:  Physical Exam  Constitutional:       Appearance: Normal appearance.   Neurological:      Mental Status: She is alert.       Labs:    TSH  No results found for: TSHBASE     Free T4  Free T4 "   Date Value Ref Range Status   06/24/2022 1.22 0.93 - 1.70 ng/dL Final       T3  No results found for: G8NQNRF      TPO  No results found for: THYROIDAB    TG AB  No results found for: THGAB    TG  No results found for: THYROGLB    CBC w/DIFF  Lab Results   Component Value Date    WBC 5.24 11/23/2020    RBC 4.99 11/23/2020    HGB 14.1 11/23/2020    HCT 44.2 11/23/2020    MCV 88.6 11/23/2020    MCH 28.3 11/23/2020    MCHC 31.9 11/23/2020    RDW 11.9 (L) 11/23/2020    RDWSD 38.4 11/23/2020    MPV 11.3 11/23/2020     11/23/2020    NEUTRORELPCT 69.3 11/23/2020    LYMPHORELPCT 23.5 11/23/2020    MONORELPCT 5.3 11/23/2020    EOSRELPCT 1.1 11/23/2020    BASORELPCT 0.6 11/23/2020    AUTOIGPER 0.2 11/23/2020    NEUTROABS 3.63 11/23/2020    LYMPHSABS 1.23 11/23/2020    MONOSABS 0.28 11/23/2020    EOSABS 0.06 11/23/2020    BASOSABS 0.03 11/23/2020    AUTOIGNUM 0.01 11/23/2020    NRBC 0.0 11/23/2020           Assessment / Plan      Assessment & Plan:  Diagnoses and all orders for this visit:    1. Low serum cortisol level (Primary)  Assessment & Plan:  She had low baseline cortisol but normal cortrosyn stimulation testing.  Other endocrine workup has been normal.  She doesn't appear to have adrenal insufficiency but I would like to check AM cortisol and ACTH levels again.  Could she have low ACTH causing the low baseline cortisol?  Will contact her with results when available.    Orders:  -     Cortisol; Future  -     ACTH; Future      Current Outpatient Medications   Medication Instructions    amLODIPine (NORVASC) 5 mg, Oral, Daily    cholecalciferol (VITAMIN D3) 1,000 Units, Oral, Daily    cyanocobalamin (CYANOCOBALAMIN) 100 mcg, Oral, Daily    Diclofenac Sodium (VOLTAREN) 4 g, Topical, 2 Times Daily    nitroglycerin (NITROSTAT) 0.4 MG SL tablet 1 under the tongue as needed for angina, may repeat q5mins for up three doses    olmesartan (BENICAR) 40 MG tablet TAKE ONE TABLET BY MOUTH DAILY    Probiotic Product  (PROBIOTIC-10 PO) 1 tablet, Oral, Daily      Return for Will contact her with results.  If cortisol & ACTH are okay, she shouldn't need more endocrine w/u..    Stephen Molina MD   09/19/2023

## 2023-09-20 RX ORDER — OLMESARTAN MEDOXOMIL 40 MG/1
40 TABLET ORAL DAILY
Qty: 90 TABLET | Refills: 1 | Status: SHIPPED | OUTPATIENT
Start: 2023-09-20

## 2023-09-22 ENCOUNTER — LAB (OUTPATIENT)
Dept: ENDOCRINOLOGY | Facility: CLINIC | Age: 63
End: 2023-09-22
Payer: COMMERCIAL

## 2023-09-22 DIAGNOSIS — R79.89 LOW SERUM CORTISOL LEVEL: ICD-10-CM

## 2023-09-22 LAB — CORTIS SERPL-MCNC: 5.38 MCG/DL

## 2023-09-22 PROCEDURE — 36415 COLL VENOUS BLD VENIPUNCTURE: CPT | Performed by: INTERNAL MEDICINE

## 2023-09-22 PROCEDURE — 82024 ASSAY OF ACTH: CPT | Performed by: INTERNAL MEDICINE

## 2023-09-22 PROCEDURE — 82533 TOTAL CORTISOL: CPT | Performed by: INTERNAL MEDICINE

## 2023-09-23 LAB — ACTH PLAS-MCNC: 8.1 PG/ML (ref 7.2–63.3)

## 2023-10-03 ENCOUNTER — TRANSCRIBE ORDERS (OUTPATIENT)
Dept: ADMINISTRATIVE | Facility: HOSPITAL | Age: 63
End: 2023-10-03
Payer: COMMERCIAL

## 2023-10-03 DIAGNOSIS — E83.52 HYPERCALCEMIA: Primary | ICD-10-CM

## 2023-10-23 NOTE — PROGRESS NOTES
Subjective:     Encounter Date:10/27/2023      Patient ID: Nancy Albert is a 63 y.o.  single -American female from [place], retired from the [employer].     PHYSICIAN: Franklin Will MD  SLEEP PHYSICIAN: [facility]  REFERRING HEALTHCARE PROVIDER: ANA Theodore  GI PHYSICIAN:  Nicola Mukherjee MD .  HEMATOLOGIST: Bhupendra De La Cruz MD  INTERVENTIONAL CARDIOLOGIST: Checo Groves IV, MD, [facility]  NEPHROLOGIST: Reji Castillo MD/ [facility]  RHEUMATOLOGIST: Heavenly Carroll MD ..    Chief Complaint:   Chief Complaint   Patient presents with    Palpitations     Problem List:  Hypertensive cardiovascular disease:  Graded exercise test, 2/11/2016: The maximal exercise stress test negative by ST criteria.  No precordial  symptoms developed.  Normal systolic blood pressure response.  Normal exercise tolerance.  No ventricular ectopy recorded.  Clinical correlation required.  Remote 24-hour Holter monitor approximately 2016 with her physician: Normal-data deficit   Stress echocardiogram, 10/12/2018: Normal stress echo with no significant echocardiographic evidence for myocardial ischemia, EF 0.56-0.60  Holter monitor, 9/6/2018: Abnormal monitor study with one episode of nonsustained V. tach, 6 beats in duration.  Occasional PVCs.  Rare, brief episodes of PAT.  Patient triggered event related was sinus rhythm  24-hour ambulatory blood pressure monitor November 2018: Average blood pressure 100-120/60-70 torr, heart rate 60-70 bpm, maximum blood pressure 160/85  Residual class I symptoms with intermittent random atypical chest pain, March 2019, September 2020   Stress echo, 10/5/2020: Acceptable negative echocardiographic exercise stress test  LHC, 11/23/2020: Essentially normal coronary arteries with normal LV filling pressure.  CCS class I atypical chest pain syndrome with NYHA class II exertional dyspnea and fatigue,  February 2021, August 2021, March 2022, September 2022  Echocardiogram 10/5/2022: LVEF 60%, LV diastolic function consistent with grade 1 impaired relaxation, no significant valvular abnormalities, RVSP less than 35 mmHg  Hypertension - probably essential with multiple drug intolerances.  Apparent asymptomatic nonsustained ventricular tachycardia during hypokalemia, incidentally found on Holter monitor, September 2018, ZioXT 5/12/2020-5/26/2020: Heart rate ranged between  bpm with average 71 bpm, rare atrial and ventricular ectopy, first-degree AV block intermittently, and 1 run of SVT lasting 9 beats at a maximum of 152 bpm but was asymptomatic.  No evidence of atrial fibrillation/flutter, V. tach, pauses, or ischemic ST-T changes.  Palpitations with Holter Monitor (9/6/2018): Abnormal monitor study with one episode of nonsustained V. tach, 6 beats in duration.  Occasional PVCs.  Rare, brief episodes of PAT, with recent increased severity of palpitation and event monitor completed - data deficit, April 2020   Cardiac murmur with acceptable echocardiogram, October 2018  At risk for sleep apnea with negative sleep study in 2017-data deficit, acceptable sleep study August 2021  Acid reflux, diagnosed August 2019, with repeat ED visits winter 2020 with abdominal pain and continued GI evaluation and treatment, April 2020  Leukopenia August 2020  Hypercalcemia with nuclear medicine parathyroid scan negative for parathyroid adenoma July 2022  Surgical history: KATI, left oophorectomy    Allergies   Allergen Reactions    Spironolactone Other (See Comments)     Chest pain, parasthesias    Beta Adrenergic Blockers GI Intolerance    Verapamil Hcl Er GI Intolerance       Current Outpatient Medications   Medication Instructions    amLODIPine (NORVASC) 5 mg, Oral, Daily    cholecalciferol (VITAMIN D3) 1,000 Units, Daily    cyanocobalamin (CYANOCOBALAMIN) 100 mcg, Oral, Daily    Diclofenac Sodium (VOLTAREN) 4 g, Topical, 2  "Times Daily    nitroglycerin (NITROSTAT) 0.4 MG SL tablet 1 under the tongue as needed for angina, may repeat q5mins for up three doses    olmesartan (BENICAR) 40 mg, Oral, Daily    Probiotic Product (PROBIOTIC-10 PO) 1 tablet, Oral, Daily         HISTORY OF PRESENT ILLNESS:  The patient is here for 7-month follow-up. The patient had a heart catheterization November 2020 which showed normal coronaries.  She will follow-up with her endocrinologist regarding her hypercalcemia.  She canceled her 10/4/2023 appointment.  She saw her endocrinologist in September 2023 and had low baseline cortisol but normal cortrosyn stimulation testing with plans to get repeat cortisol and ACTH.  The patient had a CT of her chest/abdomen/pelvis this morning that was ordered by her nephrologist.  She believes that her last calcium level was 10.9.  She goes back again in a few weeks for repeat laboratory testing.  She denies any chest pain, increased shortness of breath, palpitations, dizziness, presyncope, or syncope.  She says that she has been walking 2 miles a day and she feels much better when she does this.  She says she is having less joint pain since doing this.  Occasionally the patient will have blood pressures in the 140s but she does not often check her blood pressure.  She will start checking it every day and let me know if her systolic is consistently staying over 140.  The patient is looking forward to having a party this weekend for her birthday.      ROS   All other systems reviewed and otherwise negative.    Procedures       Objective:       Vitals:    10/27/23 1117 10/27/23 1120 10/27/23 1209   BP: 143/75 136/76 136/62   BP Location: Left arm Left arm Left arm   Patient Position: Sitting Standing Sitting   Pulse: 77 72    SpO2: 95% 97%    Weight: 75.8 kg (167 lb)     Height: 162.6 cm (64\")       Body mass index is 28.67 kg/m².  Last weight March 2023 was 163 pounds  Constitutional:       Appearance: Healthy appearance. " Not in distress.   Neck:      Vascular: No JVR. JVD normal.   Pulmonary:      Effort: Pulmonary effort is normal.      Breath sounds: Normal breath sounds. No wheezing. No rhonchi. No rales.   Chest:      Chest wall: Not tender to palpatation.   Cardiovascular:      PMI at left midclavicular line. Normal rate. Regular rhythm. Normal S1. Normal S2.       Murmurs: There is a grade 2/6 systolic murmur at the URSB, LLSB and ULSB.      No gallop.  No click. No rub.   Pulses:     Intact distal pulses.   Edema:     Peripheral edema absent.   Abdominal:      General: Bowel sounds are normal.      Palpations: Abdomen is soft.      Tenderness: There is no abdominal tenderness.   Musculoskeletal: Normal range of motion.         General: No tenderness. Skin:     General: Skin is warm and dry.   Neurological:      General: No focal deficit present.      Mental Status: Alert and oriented to person, place and time.           Lab Review:   Lab Results   Component Value Date    GLUCOSE 100 (H) 11/23/2020    BUN 16 11/23/2020    CREATININE 0.71 11/23/2020    EGFRIFAFRI 101 11/23/2020    BCR 22.5 11/23/2020    CO2 25.0 11/23/2020    CALCIUM 9.9 11/23/2020    PROTENTOTREF 7.5 09/28/2020    ALBUMIN 4.60 11/23/2020    LABIL2 1.1 09/28/2020    AST 16 11/23/2020    ALT 9 11/23/2020       Lab Results   Component Value Date    WBC 5.24 11/23/2020    HGB 14.1 11/23/2020    HCT 44.2 11/23/2020    MCV 88.6 11/23/2020     11/23/2020       Lab Results   Component Value Date    HGBA1C 5.20 11/23/2020       Lab Results   Component Value Date    TSH 0.514 06/24/2022       Lab Results   Component Value Date    CHOL 187 11/23/2020     Lab Results   Component Value Date    TRIG 68 11/23/2020     Lab Results   Component Value Date    HDL 70 (H) 11/23/2020     Lab Results   Component Value Date     (H) 11/23/2020 2/23/2022:  Cortisol 4.9  Urinalysis unremarkable except trace occult blood, WBC WNL, RBC 3-10, no casts and few  bacteria  Vitamin D-21.1  PTH 54  CBC, WBC 5.1, RBC 4.59, hemoglobin 12.9, hematocrit 39, MCV 85, MCH 28.1, MCHC 33.1, RDW 12.4, platelets 213, neutrophils 62,/32, monocytes 5, eos 1, basos 0  Renal panel: Glucose 94, BUN 14, creatinine 0.74, , sodium 137, potassium 4.4, chloride 101, carbon dioxide 31, calcium 9.9, phosphorus 3.2, albumin 4.6     9/02/2022:  Renal panel: Glucose 90, BUN 13, creatinine 0.77, GFR 87, sodium 139, potassium 4.3, chloride 102, carbon dioxide 30, calcium 10.3, phosphorus 3.5, albumin 4.8  PTH 52  Vitamin D-32.5    Advance Care Planning   ACP discussion was held with the patient during this visit. Patient has an advance directive (not in EMR), copy requested.          Assessment:       Overall continued acceptable course with no new interim cardiopulmonary complaints with acceptable functional status. We will defer additional diagnostic or therapeutic intervention from a cardiac perspective at this time. Patient not a good candidate for hydrochlorothiazide in view of history of hypercalcemia.  She has an upcoming appointment again with nephrology soon for repeat testing.  She had an echocardiogram 10/5/2022 showed LVEF 60%, LV diastolic function consistent with grade 1 impaired relaxation, no significant valvular abnormalities, RVSP less than 35 mmHg.The patient had a heart catheterization November 2020 which showed normal coronaries.       Diagnosis Plan   1. Palpitations  No recurrent palpitations      2. Essential hypertension  Continue current cardiac medications, patient will start monitoring her blood pressure every day and let me know if her systolic blood pressure is staying over 140 mmHg      3. Precordial pain  No recurrent angina pectoris or CHF on current activity schedule; continue current treatment              Plan:         Patient to continue current medications and close follow up with the above providers.  Tentative cardiology follow up in May 2024 or patient may  return sooner PRN.      Electronically signed by Jennifer Ko, ANA, 10/27/23, 12:18 PM EDT.

## 2023-10-25 ENCOUNTER — TRANSCRIBE ORDERS (OUTPATIENT)
Dept: ADMINISTRATIVE | Facility: HOSPITAL | Age: 63
End: 2023-10-25
Payer: COMMERCIAL

## 2023-10-25 DIAGNOSIS — E83.52 HYPERCALCEMIA: Primary | ICD-10-CM

## 2023-10-27 ENCOUNTER — HOSPITAL ENCOUNTER (OUTPATIENT)
Dept: CT IMAGING | Facility: HOSPITAL | Age: 63
Discharge: HOME | End: 2023-10-27
Admitting: INTERNAL MEDICINE
Payer: COMMERCIAL

## 2023-10-27 ENCOUNTER — OFFICE VISIT (OUTPATIENT)
Dept: CARDIOLOGY | Facility: CLINIC | Age: 63
End: 2023-10-27
Payer: COMMERCIAL

## 2023-10-27 VITALS
OXYGEN SATURATION: 97 % | BODY MASS INDEX: 28.51 KG/M2 | DIASTOLIC BLOOD PRESSURE: 62 MMHG | HEIGHT: 64 IN | WEIGHT: 167 LBS | HEART RATE: 72 BPM | SYSTOLIC BLOOD PRESSURE: 136 MMHG

## 2023-10-27 DIAGNOSIS — I10 ESSENTIAL HYPERTENSION: ICD-10-CM

## 2023-10-27 DIAGNOSIS — R07.2 PRECORDIAL PAIN: ICD-10-CM

## 2023-10-27 DIAGNOSIS — E83.52 HYPERCALCEMIA: ICD-10-CM

## 2023-10-27 DIAGNOSIS — R00.2 PALPITATIONS: Primary | ICD-10-CM

## 2023-10-27 PROCEDURE — 74176 CT ABD & PELVIS W/O CONTRAST: CPT

## 2023-10-27 PROCEDURE — 99214 OFFICE O/P EST MOD 30 MIN: CPT | Performed by: NURSE PRACTITIONER

## 2023-10-27 PROCEDURE — 71250 CT THORAX DX C-: CPT

## 2024-04-22 NOTE — PROGRESS NOTES
Subjective:     Encounter Date:05/01/2024      Patient ID: Nancy Albert is a 63 y.o. single -American female from [place], retired from the [employer].     PHYSICIAN: Cynthia Rivera DO  FORMER PHYSICIAN: Franklin Will MD  SLEEP PHYSICIAN: [facility]  REFERRING HEALTHCARE PROVIDER: ANA Theodore  GI PHYSICIAN:  Nicola Mukherjee MD .  HEMATOLOGIST: Bhupendra De La Cruz MD  INTERVENTIONAL CARDIOLOGIST: Checo Groves IV, MD, [facility], [facility]  NEPHROLOGIST: Reji Castillo MD/ [facility]  RHEUMATOLOGIST: Heavenly Carroll MD.    Chief Complaint:   Chief Complaint   Patient presents with    Chest pain with normal angiography     Problem List:  Hypertensive cardiovascular disease:  Graded exercise test, 2/11/2016: The maximal exercise stress test negative by ST criteria.  No precordial  symptoms developed.  Normal systolic blood pressure response.  Normal exercise tolerance.  No ventricular ectopy recorded.  Clinical correlation required.  Remote 24-hour Holter monitor approximately 2016 with her physician: Normal-data deficit   Stress echocardiogram, 10/12/2018: Normal stress echo with no significant echocardiographic evidence for myocardial ischemia, EF 0.56-0.60  Holter monitor, 9/6/2018: Abnormal monitor study with one episode of nonsustained V. tach, 6 beats in duration.  Occasional PVCs.  Rare, brief episodes of PAT.  Patient triggered event related was sinus rhythm  24-hour ambulatory blood pressure monitor November 2018: Average blood pressure 100-120/60-70 torr, heart rate 60-70 bpm, maximum blood pressure 160/85  Residual class I symptoms with intermittent random atypical chest pain, March 2019, September 2020   Stress echo, 10/5/2020: Acceptable negative echocardiographic exercise stress test  LHC, 11/23/2020: Essentially normal coronary arteries with normal LV filling pressure.  CCS class I atypical chest pain  syndrome with NYHA class II exertional dyspnea and fatigue, February 2021, August 2021, March 2022, September 2022  Echocardiogram 10/5/2022: LVEF 60%, LV diastolic function consistent with grade 1 impaired relaxation, no significant valvular abnormalities, RVSP less than 35 mmHg  Hypertension - probably essential with multiple drug intolerances.  Apparent asymptomatic nonsustained ventricular tachycardia during hypokalemia, incidentally found on Holter monitor, September 2018, ZioXT 5/12/2020-5/26/2020: Heart rate ranged between  bpm with average 71 bpm, rare atrial and ventricular ectopy, first-degree AV block intermittently, and 1 run of SVT lasting 9 beats at a maximum of 152 bpm but was asymptomatic.  No evidence of atrial fibrillation/flutter, V. tach, pauses, or ischemic ST-T changes.  Palpitations with Holter Monitor (9/6/2018): Abnormal monitor study with one episode of nonsustained V. tach, 6 beats in duration.  Occasional PVCs.  Rare, brief episodes of PAT, with recent increased severity of palpitation and event monitor completed - data deficit, April 2020   Cardiac murmur with acceptable echocardiogram, October 2018  At risk for sleep apnea with negative sleep study in 2017-data deficit, acceptable sleep study August 2021  Acid reflux, diagnosed August 2019, with repeat ED visits winter 2020 with abdominal pain and continued GI evaluation and treatment, April 2020  Leukopenia August 2020  Hypercalcemia with nuclear medicine parathyroid scan negative for parathyroid adenoma July 2022  Surgical history: KATI, left oophorectomy    Allergies   Allergen Reactions    Spironolactone Other (See Comments)     Chest pain, parasthesias    Beta Adrenergic Blockers GI Intolerance    Verapamil Hcl Er GI Intolerance       Current Outpatient Medications   Medication Instructions    amLODIPine (NORVASC) 5 mg, Oral, Daily    chlorthalidone (HYGROTON) 25 mg, Oral    irbesartan (AVAPRO) 150 mg, Oral, Daily     "nitroglycerin (NITROSTAT) 0.4 MG SL tablet 1 under the tongue as needed for angina, may repeat q5mins for up three doses    Probiotic Product (PROBIOTIC-10 PO) 1 tablet, Oral, Daily         HISTORY OF PRESENT ILLNESS:  The patient is here for 6-month follow-up.  Patient has an upcoming appointment with labs this month with nephrology.  Her PCP a couple months ago switched her olmesartan to irbesartan and added chlorthalidone.  She denies any chest pain, shortness of breath, palpitations, dizziness, presyncope, or syncope.  She does some walking for exercise.  She has an upcoming trip to [place] in June and [place] in August that she is looking forward to.  Her blood pressures at home have been in the 120s/70s.  Patient feels like her blood pressure has been much better since the switch.  She is aware of thiazides worsening calcium levels.  She will have her next labs that are going to be drawn soon sent to me for review.    ROS   All other systems reviewed and otherwise negative.      ECG 12 Lead    Date/Time: 5/1/2024 9:46 AM  Performed by: Jennifer Ko APRN    Authorized by: Jennifer Ko APRN  Rhythm comments: Normal sinus rhythm, nonspecific T wave abnormality, abnormal ECG, 67 bpm,  ms, QRS 80 ms, QTc 437 ms, no significant changes from last ECG March 2023             Objective:       Vitals:    05/01/24 0928 05/01/24 0939 05/01/24 1002   BP: 138/80 142/78 130/60   BP Location: Right arm Right arm Right arm   Patient Position: Sitting Standing Sitting   Cuff Size: Adult     Pulse: 71     SpO2: 98% 98%    Weight: 77.5 kg (170 lb 12.8 oz)     Height: 162.6 cm (64\")       Body mass index is 29.32 kg/m².  Wt Readings from Last 2 Encounters:   05/01/24 77.5 kg (170 lb 12.8 oz)   02/05/24 77.1 kg (170 lb)        Constitutional:       Appearance: Healthy appearance. Not in distress.   Neck:      Vascular: No JVR. JVD normal.   Pulmonary:      Effort: Pulmonary effort is normal.      Breath sounds: " Normal breath sounds. No wheezing. No rhonchi. No rales.   Chest:      Chest wall: Not tender to palpatation.   Cardiovascular:      PMI at left midclavicular line. Normal rate. Regular rhythm. Normal S1. Normal S2.       Murmurs: There is a grade 2/6 systolic murmur at the URSB, LLSB, LRSB and ULSB.      No gallop.  No click. No rub.   Pulses:     Intact distal pulses.   Edema:     Peripheral edema absent.   Abdominal:      General: Bowel sounds are normal.      Palpations: Abdomen is soft.      Tenderness: There is no abdominal tenderness.   Musculoskeletal: Normal range of motion.         General: No tenderness. Skin:     General: Skin is warm and dry.   Neurological:      General: No focal deficit present.      Mental Status: Alert and oriented to person, place and time.           Lab Review:   Lab Results   Component Value Date    GLUCOSE 100 (H) 11/23/2020    BUN 16 11/23/2020    CREATININE 0.71 11/23/2020    EGFRIFAFRI 101 11/23/2020    BCR 22.5 11/23/2020    CO2 25.0 11/23/2020    CALCIUM 9.9 11/23/2020    PROTENTOTREF 7.5 09/28/2020    ALBUMIN 4.60 11/23/2020    LABIL2 1.1 09/28/2020    AST 16 11/23/2020    ALT 9 11/23/2020       Lab Results   Component Value Date    WBC 5.24 11/23/2020    HGB 14.1 11/23/2020    HCT 44.2 11/23/2020    MCV 88.6 11/23/2020     11/23/2020       Lab Results   Component Value Date    HGBA1C 5.20 11/23/2020       Lab Results   Component Value Date    TSH 0.514 06/24/2022       Lab Results   Component Value Date    CHOL 187 11/23/2020     Lab Results   Component Value Date    TRIG 68 11/23/2020     Lab Results   Component Value Date    HDL 70 (H) 11/23/2020     Lab Results   Component Value Date     (H) 11/23/2020 9/02/2022:  Renal panel: Glucose 90, BUN 13, creatinine 0.77, GFR 87, sodium 139, potassium 4.3, chloride 102, carbon dioxide 30, calcium 10.3, phosphorus 3.5, albumin 4.8  PTH 52  Vitamin D-32.5      5/30/2023:  Lipid panel: Cholesterol 179,  triglycerides 64, HDL 62,   TSH 0.729  Hemoglobin A1c 5.7%  BMP: Glucose 97, BUN 14, creatinine 0.69, GFR 97, sodium 140, potassium 4.2, chloride 101, carbon dioxide 29, calcium 10.3      Results for orders placed in visit on 09/08/22    Adult Transthoracic Echo Complete W/ Cont if Necessary Per Protocol    Interpretation Summary  · Left ventricular systolic function is normal. Estimated left ventricular EF = 60%.  · Left ventricular diastolic function is consistent with (grade I) impaired relaxation.  · No significant valvular abnormalities.  · Estimated right ventricular systolic pressure from tricuspid regurgitation is normal (<35 mmHg).            Advance Care Planning   ACP discussion was held with the patient during this visit. Patient does not have an advance directive, declines further assistance.      Assessment:     Overall continued acceptable course with no new interim cardiopulmonary complaints with acceptable functional status. We will defer additional diagnostic or therapeutic intervention from a cardiac perspective at this time. The patient had a heart catheterization November 2020 which showed normal coronaries.  Hopefully I will get to review the patient's next laboratory testing results that are to be drawn this month with nephrology.  Concern with chlorthalidone and hypercalcemia; this may need to be discontinued after review of next labs.     Diagnosis Plan   1. Chest pain with normal angiography  The patient had a heart catheterization November 2020 which showed normal coronaries.  Continue current cardiac medications      2. Essential hypertension  Controlled, continue current cardiac medications for now, Concern with chlorthalidone and hypercalcemia; this may need to be discontinued after review of next labs.      3. Palpitations  Stable             Plan:         Patient to continue current medications and close follow up with the above providers.  Tentative cardiology follow up in  October 2024 or patient may return sooner PRN.      Electronically signed by ANA Bradshaw, 05/01/24, 10:07 AM EDT.

## 2024-05-01 ENCOUNTER — PATIENT ROUNDING (BHMG ONLY) (OUTPATIENT)
Dept: CARDIOLOGY | Facility: CLINIC | Age: 64
End: 2024-05-01
Payer: COMMERCIAL

## 2024-05-01 ENCOUNTER — OFFICE VISIT (OUTPATIENT)
Dept: CARDIOLOGY | Facility: CLINIC | Age: 64
End: 2024-05-01
Payer: COMMERCIAL

## 2024-05-01 VITALS
OXYGEN SATURATION: 98 % | BODY MASS INDEX: 29.16 KG/M2 | HEART RATE: 71 BPM | HEIGHT: 64 IN | WEIGHT: 170.8 LBS | SYSTOLIC BLOOD PRESSURE: 130 MMHG | DIASTOLIC BLOOD PRESSURE: 60 MMHG

## 2024-05-01 DIAGNOSIS — R07.9 CHEST PAIN WITH NORMAL ANGIOGRAPHY: Primary | ICD-10-CM

## 2024-05-01 DIAGNOSIS — R00.2 PALPITATIONS: ICD-10-CM

## 2024-05-01 DIAGNOSIS — I10 ESSENTIAL HYPERTENSION: ICD-10-CM

## 2024-05-01 PROCEDURE — 99214 OFFICE O/P EST MOD 30 MIN: CPT | Performed by: NURSE PRACTITIONER

## 2024-05-01 PROCEDURE — 93000 ELECTROCARDIOGRAM COMPLETE: CPT | Performed by: NURSE PRACTITIONER

## 2024-05-01 RX ORDER — IRBESARTAN 150 MG/1
150 TABLET ORAL DAILY
COMMUNITY
Start: 2024-02-06 | End: 2025-02-05

## 2024-05-01 RX ORDER — CHLORTHALIDONE 25 MG/1
25 TABLET ORAL
COMMUNITY
Start: 2024-02-06 | End: 2025-02-05

## 2024-05-01 NOTE — PROGRESS NOTES
"May 1, 2024    Hello, may I speak with Nancy Albert? Yes.    My name is Aileen QUINTANILLA      I am  with [facility] CARDIOLOG[facility]  [address]  [facility] [address] [phone].    Before we get started may I verify your date of birth? 1960, Yes, correct.    I am calling to officially welcome you to our practice and ask about your recent visit. Is this a good time to talk? Yes.     Tell me about your visit with us. What things went well?  Everything good/fine.       We're always looking for ways to make our patients' experiences even better. Do you have recommendations on ways we may improve?  No, \"maybe make the office's warmer!\"    Overall were you satisfied with your first visit to our practice? Yes.       I appreciate you taking the time to speak with me today. Is there anything else I can do for you? NO.      Thank you, and have a great day.      "

## 2024-10-21 NOTE — TELEPHONE ENCOUNTER
Dr Mukherjee,  Patient called wanting to know if she need to continue Esomeprazole and do she need to follow up with you? Please advise. Thanks   Please see the attached refill request.

## 2024-10-22 NOTE — PROGRESS NOTES
Subjective:     Encounter Date:11/01/2024      Patient ID: Nancy Albert is a 64 y.o. single -American female from [place], retired from the [employer].     PHYSICIAN: Cynthia Rivera DO  FORMER PHYSICIAN: Franklin Will MD  SLEEP PHYSICIAN: [facility]  REFERRING HEALTHCARE PROVIDER: ANA Theodore  GI PHYSICIAN:  Nicola Mukherjee MD .  HEMATOLOGIST: Bhupendra De La Cruz MD  INTERVENTIONAL CARDIOLOGIST: Checo Groves IV, MD, [facility], [facility]  NEPHROLOGIST: Reji Castillo MD/ [facility]  RHEUMATOLOGIST: Heavenly Carroll MD.  ENDOCRINOLOGIST: ANA Montoya    Chief Complaint:   Chief Complaint   Patient presents with    Palpitations     Problem List:  Hypertensive cardiovascular disease:  Graded exercise test, 2/11/2016: The maximal exercise stress test negative by ST criteria.  No precordial  symptoms developed.  Normal systolic blood pressure response.  Normal exercise tolerance.  No ventricular ectopy recorded.  Clinical correlation required.  Remote 24-hour Holter monitor approximately 2016 with her physician: Normal-data deficit   Stress echocardiogram, 10/12/2018: Normal stress echo with no significant echocardiographic evidence for myocardial ischemia, EF 0.56-0.60  Holter monitor, 9/6/2018: Abnormal monitor study with one episode of nonsustained V. tach, 6 beats in duration.  Occasional PVCs.  Rare, brief episodes of PAT.  Patient triggered event related was sinus rhythm  24-hour ambulatory blood pressure monitor November 2018: Average blood pressure 100-120/60-70 torr, heart rate 60-70 bpm, maximum blood pressure 160/85  Residual class I symptoms with intermittent random atypical chest pain, March 2019, September 2020   Stress echo, 10/5/2020: Acceptable negative echocardiographic exercise stress test  LHC, 11/23/2020: Essentially normal coronary arteries with normal LV filling pressure.  CCS class I  atypical chest pain syndrome with NYHA class II exertional dyspnea and fatigue, February 2021, August 2021, March 2022, September 2022, November 2024  Echocardiogram 10/5/2022: LVEF 60%, LV diastolic function consistent with grade 1 impaired relaxation, no significant valvular abnormalities, RVSP less than 35 mmHg  Hypertension - probably essential with multiple drug intolerances.  Apparent asymptomatic nonsustained ventricular tachycardia during hypokalemia, incidentally found on Holter monitor, September 2018, [employer] 5/12/2020-5/26/2020: Heart rate ranged between  bpm with average 71 bpm, rare atrial and ventricular ectopy, first-degree AV block intermittently, and 1 run of SVT lasting 9 beats at a maximum of 152 bpm but was asymptomatic.  No evidence of atrial fibrillation/flutter, V. tach, pauses, or ischemic ST-T changes.  Palpitations with Holter Monitor (9/6/2018): Abnormal monitor study with one episode of nonsustained V. tach, 6 beats in duration.  Occasional PVCs.  Rare, brief episodes of PAT, with recent increased severity of palpitation and event monitor completed - data deficit, April 2020   Cardiac murmur with acceptable echocardiogram, October 2018  At risk for sleep apnea with negative sleep study in 2017-data deficit, acceptable sleep study August 2021  Acid reflux, diagnosed August 2019, with repeat ED visits winter 2020 with abdominal pain and continued GI evaluation and treatment, April 2020  Leukopenia August 2020  Hyperparathyroidism, hypercalcemia with nuclear medicine parathyroid scan negative for parathyroid adenoma July 2022, normal cortisol and ACTH September 2023  Systolic heart murmur  Surgical history: KATI, left oophorectomy    Allergies   Allergen Reactions    Spironolactone Other (See Comments)     Chest pain, parasthesias    Beta Adrenergic Blockers GI Intolerance    Verapamil Hcl Er GI Intolerance       Current Outpatient Medications   Medication Instructions    amLODIPine  "(NORVASC) 5 mg, Oral, Daily    chlorthalidone (HYGROTON) 25 mg, Oral    irbesartan (AVAPRO) 150 mg, Oral, Daily    nitroglycerin (NITROSTAT) 0.4 MG SL tablet 1 under the tongue as needed for angina, may repeat q5mins for up three doses    Probiotic Product (PROBIOTIC-10 PO) 1 tablet, Oral, Daily         HISTORY OF PRESENT ILLNESS:  The patient is here for 6-month follow-up.The patient had a heart catheterization November 2020 which showed normal coronaries. Patient has upcoming parathyroidectomy January 2025.  Patient says that she was just contacted and surgery will now be in February 2025.  She says her blood pressure at home is normally around 130/70.  Recheck blood pressure right arm sitting was 130/70 in office.  She says that the chlorthalidone is the only thing that has made her blood pressure come back down.  She says that her calcium levels have not significantly changed since she started on the chlorthalidone and they have always been a little borderline.  She would prefer to stay on her current medications opposed to changing them at this point.  She denies any chest pain, shortness of breath, palpitations, dizziness, presyncope, or syncope.  She has not had to use any nitroglycerin sublingual.  She just started Medicare and is [place] the [place] to [place] more [place] soon.    ROS   All other systems reviewed and otherwise negative.    Procedures       Objective:       Vitals:    11/01/24 1006 11/01/24 1013 11/01/24 1037   BP: 150/86 152/84 130/70   BP Location: Right arm Right arm Right arm   Patient Position: Sitting Standing Sitting   Cuff Size: Adult Adult    Pulse: 89 74    SpO2: 100% 100%    Weight: 77.5 kg (170 lb 12.8 oz)     Height: 162.6 cm (64\")       Body mass index is 29.32 kg/m².  Wt Readings from Last 2 Encounters:   11/01/24 77.5 kg (170 lb 12.8 oz)   05/01/24 77.5 kg (170 lb 12.8 oz)        Constitutional:       Appearance: Healthy appearance. Not in distress.   Neck:     "  Vascular: No JVR. JVD normal.   Pulmonary:      Effort: Pulmonary effort is normal.      Breath sounds: Normal breath sounds. No wheezing. No rhonchi. No rales.   Chest:      Chest wall: Not tender to palpatation.   Cardiovascular:      PMI at left midclavicular line. Normal rate. Regular rhythm. Normal S1. Normal S2.       Murmurs: There is a grade 2/6 systolic murmur at the URSB, LLSB, LRSB and ULSB.      No gallop.  No click. No rub.   Pulses:     Intact distal pulses.   Edema:     Peripheral edema absent.   Abdominal:      General: Bowel sounds are normal.      Palpations: Abdomen is soft.      Tenderness: There is no abdominal tenderness.   Musculoskeletal: Normal range of motion.         General: No tenderness. Skin:     General: Skin is warm and dry.   Neurological:      General: No focal deficit present.      Mental Status: Alert and oriented to person, place and time.           Lab Review:   Lab Results   Component Value Date    GLUCOSE 100 (H) 11/23/2020    BUN 16 11/23/2020    CREATININE 0.71 11/23/2020    EGFRIFAFRI 101 11/23/2020    BCR 22.5 11/23/2020    CO2 25.0 11/23/2020    CALCIUM 9.9 11/23/2020    PROTENTOTREF 7.5 09/28/2020    ALBUMIN 4.60 11/23/2020    LABIL2 1.1 09/28/2020    AST 16 11/23/2020    ALT 9 11/23/2020       Lab Results   Component Value Date    WBC 5.24 11/23/2020    HGB 14.1 11/23/2020    HCT 44.2 11/23/2020    MCV 88.6 11/23/2020     11/23/2020       Lab Results   Component Value Date    HGBA1C 5.20 11/23/2020       Lab Results   Component Value Date    TSH 0.514 06/24/2022       Lab Results   Component Value Date    CHOL 187 11/23/2020     Lab Results   Component Value Date    TRIG 68 11/23/2020     Lab Results   Component Value Date    HDL 70 (H) 11/23/2020     Lab Results   Component Value Date     (H) 11/23/2020             Results for orders placed in visit on 09/08/22    Adult Transthoracic Echo Complete W/ Cont if Necessary Per Protocol    Interpretation  Summary  · Left ventricular systolic function is normal. Estimated left ventricular EF = 60%.  · Left ventricular diastolic function is consistent with (grade I) impaired relaxation.  · No significant valvular abnormalities.  · Estimated right ventricular systolic pressure from tricuspid regurgitation is normal (<35 mmHg).     5/30/2023:  Lipid panel: Cholesterol 179, triglycerides 64, HDL 62,   TSH 0.729  Hemoglobin A1c 5.7%  BMP: Glucose 97, BUN 14, creatinine 0.69, GFR 97, sodium 140, potassium 4.2, chloride 101, carbon dioxide 29, calcium 10.3       Advance Care Planning   ACP discussion was held with the patient during this visit. Patient does not have an advance directive, declines further assistance.      Assessment:     Overall continued acceptable course with no new interim cardiopulmonary complaints with acceptable functional status. We will defer additional diagnostic or therapeutic intervention from a cardiac perspective at this time. The patient had a heart catheterization November 2020 which showed normal coronaries.  Hopefully I will get to review the patient's next laboratory testing results that are to be drawn this month with nephrology.  Will obtain new echocardiogram to assess systolic heart murmur prior to surgery.     Diagnosis Plan   1. Cardiac syndrome X  No recurrent angina pectoris or CHF on current activity schedule; continue current treatment       2. Essential hypertension  Controlled, continue current cardiac medications      3. Palpitations  No recurrence      4. Systolic murmur  Adult Transthoracic Echo Complete W/ Cont if Necessary Per Protocol             Plan:         Patient to continue current medications and close follow up with the above providers.  Tentative cardiology follow up in May 2025 or patient may return sooner PRN.  Echocardiogram       Electronically signed by ANA Bradshaw, 11/01/24, 10:40 AM EDT.

## 2024-11-01 ENCOUNTER — OFFICE VISIT (OUTPATIENT)
Dept: CARDIOLOGY | Facility: CLINIC | Age: 64
End: 2024-11-01
Payer: MEDICARE

## 2024-11-01 VITALS
BODY MASS INDEX: 29.16 KG/M2 | DIASTOLIC BLOOD PRESSURE: 70 MMHG | SYSTOLIC BLOOD PRESSURE: 130 MMHG | WEIGHT: 170.8 LBS | HEART RATE: 74 BPM | HEIGHT: 64 IN | OXYGEN SATURATION: 100 %

## 2024-11-01 DIAGNOSIS — R01.1 SYSTOLIC MURMUR: ICD-10-CM

## 2024-11-01 DIAGNOSIS — R00.2 PALPITATIONS: ICD-10-CM

## 2024-11-01 DIAGNOSIS — I10 ESSENTIAL HYPERTENSION: ICD-10-CM

## 2024-11-01 DIAGNOSIS — I20.89 CARDIAC SYNDROME X: Primary | ICD-10-CM

## 2024-11-01 PROCEDURE — 99214 OFFICE O/P EST MOD 30 MIN: CPT | Performed by: NURSE PRACTITIONER

## 2024-11-01 PROCEDURE — 1159F MED LIST DOCD IN RCRD: CPT | Performed by: NURSE PRACTITIONER

## 2024-11-01 PROCEDURE — 3075F SYST BP GE 130 - 139MM HG: CPT | Performed by: NURSE PRACTITIONER

## 2024-11-01 PROCEDURE — 1160F RVW MEDS BY RX/DR IN RCRD: CPT | Performed by: NURSE PRACTITIONER

## 2024-11-01 PROCEDURE — 3078F DIAST BP <80 MM HG: CPT | Performed by: NURSE PRACTITIONER

## 2024-11-14 ENCOUNTER — HOSPITAL ENCOUNTER (OUTPATIENT)
Dept: CARDIOLOGY | Facility: HOSPITAL | Age: 64
Discharge: HOME | End: 2024-11-14
Admitting: NURSE PRACTITIONER
Payer: MEDICARE

## 2024-11-14 VITALS — WEIGHT: 170.86 LBS | BODY MASS INDEX: 29.17 KG/M2 | HEIGHT: 64 IN

## 2024-11-14 LAB
BH CV ECHO MEAS - AO MAX PG: 16 MMHG
BH CV ECHO MEAS - AO MEAN PG: 8.3 MMHG
BH CV ECHO MEAS - AO ROOT DIAM: 2.7 CM
BH CV ECHO MEAS - AO V2 MAX: 199 CM/SEC
BH CV ECHO MEAS - AO V2 VTI: 39.8 CM
BH CV ECHO MEAS - AVA(I,D): 1.9 CM2
BH CV ECHO MEAS - EDV(CUBED): 74.1 ML
BH CV ECHO MEAS - EDV(MOD-SP2): 86.5 ML
BH CV ECHO MEAS - EDV(MOD-SP4): 95.1 ML
BH CV ECHO MEAS - EF(MOD-BP): 62.9 %
BH CV ECHO MEAS - EF(MOD-SP2): 63.7 %
BH CV ECHO MEAS - EF(MOD-SP4): 62.7 %
BH CV ECHO MEAS - ESV(CUBED): 19.7 ML
BH CV ECHO MEAS - ESV(MOD-SP2): 31.4 ML
BH CV ECHO MEAS - ESV(MOD-SP4): 35.5 ML
BH CV ECHO MEAS - FS: 35.7 %
BH CV ECHO MEAS - IVS/LVPW: 0.9 CM
BH CV ECHO MEAS - IVSD: 0.9 CM
BH CV ECHO MEAS - LA DIMENSION: 3.4 CM
BH CV ECHO MEAS - LAT PEAK E' VEL: 9.3 CM/SEC
BH CV ECHO MEAS - LV DIASTOLIC VOL/BSA (35-75): 52.1 CM2
BH CV ECHO MEAS - LV MASS(C)D: 127.8 GRAMS
BH CV ECHO MEAS - LV MAX PG: 6.3 MMHG
BH CV ECHO MEAS - LV MEAN PG: 3 MMHG
BH CV ECHO MEAS - LV SYSTOLIC VOL/BSA (12-30): 19.4 CM2
BH CV ECHO MEAS - LV V1 MAX: 125 CM/SEC
BH CV ECHO MEAS - LV V1 VTI: 24.1 CM
BH CV ECHO MEAS - LVIDD: 4.2 CM
BH CV ECHO MEAS - LVIDS: 2.7 CM
BH CV ECHO MEAS - LVOT AREA: 3.1 CM2
BH CV ECHO MEAS - LVOT DIAM: 2 CM
BH CV ECHO MEAS - LVPWD: 1 CM
BH CV ECHO MEAS - MED PEAK E' VEL: 5.9 CM/SEC
BH CV ECHO MEAS - MV A MAX VEL: 88 CM/SEC
BH CV ECHO MEAS - MV DEC SLOPE: 322 CM/SEC2
BH CV ECHO MEAS - MV DEC TIME: 0.21 SEC
BH CV ECHO MEAS - MV E MAX VEL: 76.5 CM/SEC
BH CV ECHO MEAS - MV E/A: 0.87
BH CV ECHO MEAS - MV MAX PG: 4.8 MMHG
BH CV ECHO MEAS - MV MEAN PG: 2 MMHG
BH CV ECHO MEAS - MV P1/2T: 93.7 MSEC
BH CV ECHO MEAS - MV V2 VTI: 34.1 CM
BH CV ECHO MEAS - MVA(P1/2T): 2.35 CM2
BH CV ECHO MEAS - MVA(VTI): 2.22 CM2
BH CV ECHO MEAS - PA ACC TIME: 0.15 SEC
BH CV ECHO MEAS - RAP SYSTOLE: 3 MMHG
BH CV ECHO MEAS - RVSP: 22 MMHG
BH CV ECHO MEAS - SV(LVOT): 75.7 ML
BH CV ECHO MEAS - SV(MOD-SP2): 55.1 ML
BH CV ECHO MEAS - SV(MOD-SP4): 59.6 ML
BH CV ECHO MEAS - SVI(LVOT): 41.5 ML/M2
BH CV ECHO MEAS - SVI(MOD-SP2): 30.2 ML/M2
BH CV ECHO MEAS - SVI(MOD-SP4): 32.6 ML/M2
BH CV ECHO MEAS - TAPSE (>1.6): 2.7 CM
BH CV ECHO MEAS - TR MAX PG: 19.4 MMHG
BH CV ECHO MEAS - TR MAX VEL: 218.8 CM/SEC
BH CV ECHO MEASUREMENTS AVERAGE E/E' RATIO: 10.07
BH CV VAS BP RIGHT ARM: NORMAL MMHG
BH CV XLRA - RV BASE: 2.3 CM
BH CV XLRA - RV LENGTH: 6.5 CM
BH CV XLRA - RV MID: 2.3 CM
BH CV XLRA - TDI S': 14.1 CM/SEC
LEFT ATRIUM VOLUME INDEX: 20.9 ML/M2
LV EF 2D ECHO EST: 65 %

## 2024-11-14 PROCEDURE — 93306 TTE W/DOPPLER COMPLETE: CPT

## 2025-02-26 NOTE — TELEPHONE ENCOUNTER
Spoke with patient.  She states she did not receive copy of her letter from 12/11/2021.  Patient asked that we resend.  Letter printed and mailed.   81 yo. unk ac use/Age: 85 years old or older

## 2025-08-22 ENCOUNTER — OFFICE VISIT (OUTPATIENT)
Dept: CARDIOLOGY | Facility: CLINIC | Age: 65
End: 2025-08-22
Payer: MEDICARE

## 2025-08-22 VITALS
WEIGHT: 170.6 LBS | HEART RATE: 75 BPM | DIASTOLIC BLOOD PRESSURE: 68 MMHG | BODY MASS INDEX: 29.12 KG/M2 | HEIGHT: 64 IN | OXYGEN SATURATION: 99 % | SYSTOLIC BLOOD PRESSURE: 128 MMHG

## 2025-08-22 DIAGNOSIS — R00.2 PALPITATIONS: Primary | ICD-10-CM

## 2025-08-22 DIAGNOSIS — I20.89 CARDIAC SYNDROME X: ICD-10-CM

## 2025-08-22 DIAGNOSIS — I10 ESSENTIAL HYPERTENSION: ICD-10-CM

## 2025-08-22 PROCEDURE — 1160F RVW MEDS BY RX/DR IN RCRD: CPT | Performed by: NURSE PRACTITIONER

## 2025-08-22 PROCEDURE — 99214 OFFICE O/P EST MOD 30 MIN: CPT | Performed by: NURSE PRACTITIONER

## 2025-08-22 PROCEDURE — 3074F SYST BP LT 130 MM HG: CPT | Performed by: NURSE PRACTITIONER

## 2025-08-22 PROCEDURE — 93000 ELECTROCARDIOGRAM COMPLETE: CPT | Performed by: NURSE PRACTITIONER

## 2025-08-22 PROCEDURE — 1159F MED LIST DOCD IN RCRD: CPT | Performed by: NURSE PRACTITIONER

## 2025-08-22 PROCEDURE — 3078F DIAST BP <80 MM HG: CPT | Performed by: NURSE PRACTITIONER

## 2025-08-22 RX ORDER — CHLORTHALIDONE 25 MG/1
25 TABLET ORAL DAILY
COMMUNITY
Start: 2025-08-08

## 2025-08-22 RX ORDER — ATROPINE SULFATE 10 MG/ML
1 SOLUTION/ DROPS OPHTHALMIC 2 TIMES DAILY
COMMUNITY
Start: 2025-08-25

## 2025-08-22 RX ORDER — ERYTHROMYCIN 5 MG/G
OINTMENT OPHTHALMIC
COMMUNITY
Start: 2025-08-25

## (undated) DEVICE — Device

## (undated) DEVICE — CATH DIAG EXPO M/ PK 5F FL4/FR4 PIG

## (undated) DEVICE — GLIDESHEATH SLENDER STAINLESS STEEL KIT: Brand: GLIDESHEATH SLENDER

## (undated) DEVICE — SKIN PREP TRAY W/CHG: Brand: MEDLINE INDUSTRIES, INC.

## (undated) DEVICE — MODEL AT P65, P/N 701554-001KIT CONTENTS: HAND CONTROLLER, 3-WAY HIGH-PRESSURE STOPCOCK WITH ROTATING END AND PREMIUM HIGH-PRESSURE TUBING: Brand: ANGIOTOUCH® KIT

## (undated) DEVICE — PK SOL VISC ESOPH 80ML

## (undated) DEVICE — DEV COMP RAD PRELUDESYNC 24CM

## (undated) DEVICE — CATH DIAG EXPO .045 FL3  5F 100CM

## (undated) DEVICE — MODEL BT2000 P/N 700287-012KIT CONTENTS: MANIFOLD WITH SALINE AND CONTRAST PORTS, SALINE TUBING WITH SPIKE AND HAND SYRINGE, TRANSDUCER: Brand: BT2000 AUTOMATED MANIFOLD KIT